# Patient Record
Sex: FEMALE | Race: WHITE | NOT HISPANIC OR LATINO | Employment: OTHER | URBAN - METROPOLITAN AREA
[De-identification: names, ages, dates, MRNs, and addresses within clinical notes are randomized per-mention and may not be internally consistent; named-entity substitution may affect disease eponyms.]

---

## 2017-01-16 ENCOUNTER — HOSPITAL ENCOUNTER (OUTPATIENT)
Dept: LAB | Facility: MEDICAL CENTER | Age: 72
End: 2017-01-16
Attending: OBSTETRICS & GYNECOLOGY
Payer: MEDICARE

## 2017-01-16 PROCEDURE — 87077 CULTURE AEROBIC IDENTIFY: CPT

## 2017-01-16 PROCEDURE — 87086 URINE CULTURE/COLONY COUNT: CPT

## 2017-01-16 PROCEDURE — 87186 SC STD MICRODIL/AGAR DIL: CPT

## 2017-01-18 LAB
BACTERIA UR CULT: ABNORMAL
BACTERIA UR CULT: ABNORMAL
SIGNIFICANT IND 70042: ABNORMAL
SITE SITE: ABNORMAL
SOURCE SOURCE: ABNORMAL

## 2017-02-23 ENCOUNTER — HOSPITAL ENCOUNTER (OUTPATIENT)
Dept: RADIOLOGY | Facility: MEDICAL CENTER | Age: 72
End: 2017-02-23
Attending: NURSE PRACTITIONER
Payer: MEDICARE

## 2017-02-23 DIAGNOSIS — M47.812 SPONDYLOSIS OF CERVICAL REGION WITHOUT MYELOPATHY OR RADICULOPATHY: ICD-10-CM

## 2017-02-23 PROCEDURE — 72040 X-RAY EXAM NECK SPINE 2-3 VW: CPT

## 2017-03-10 ENCOUNTER — HOSPITAL ENCOUNTER (OUTPATIENT)
Dept: RADIOLOGY | Facility: MEDICAL CENTER | Age: 72
End: 2017-03-10
Attending: OBSTETRICS & GYNECOLOGY
Payer: MEDICARE

## 2017-03-10 DIAGNOSIS — N39.0 URINARY TRACT INFECTION, SITE NOT SPECIFIED: ICD-10-CM

## 2017-03-10 PROCEDURE — 76775 US EXAM ABDO BACK WALL LIM: CPT

## 2017-04-18 ENCOUNTER — HOSPITAL ENCOUNTER (OUTPATIENT)
Dept: LAB | Facility: MEDICAL CENTER | Age: 72
End: 2017-04-18
Attending: OBSTETRICS & GYNECOLOGY
Payer: MEDICARE

## 2017-04-18 LAB
ALBUMIN SERPL BCP-MCNC: 4.5 G/DL (ref 3.2–4.9)
ALBUMIN/GLOB SERPL: 1.9 G/DL
ALP SERPL-CCNC: 68 U/L (ref 30–99)
ALT SERPL-CCNC: 13 U/L (ref 2–50)
ANION GAP SERPL CALC-SCNC: 7 MMOL/L (ref 0–11.9)
APPEARANCE UR: ABNORMAL
APPEARANCE UR: ABNORMAL
AST SERPL-CCNC: 20 U/L (ref 12–45)
BACTERIA #/AREA URNS HPF: ABNORMAL /HPF
BACTERIA #/AREA URNS HPF: ABNORMAL /HPF
BILIRUB SERPL-MCNC: 0.4 MG/DL (ref 0.1–1.5)
BILIRUB UR QL STRIP.AUTO: NEGATIVE
BILIRUB UR QL STRIP.AUTO: NEGATIVE
BUN SERPL-MCNC: 18 MG/DL (ref 8–22)
CALCIUM SERPL-MCNC: 10.1 MG/DL (ref 8.5–10.5)
CHLORIDE SERPL-SCNC: 106 MMOL/L (ref 96–112)
CO2 SERPL-SCNC: 25 MMOL/L (ref 20–33)
COLOR UR: YELLOW
COLOR UR: YELLOW
CREAT SERPL-MCNC: 0.98 MG/DL (ref 0.5–1.4)
CULTURE IF INDICATED INDCX: YES UA CULTURE
EPI CELLS #/AREA URNS HPF: ABNORMAL /HPF
EPI CELLS #/AREA URNS HPF: ABNORMAL /HPF
GFR SERPL CREATININE-BSD FRML MDRD: 55 ML/MIN/1.73 M 2
GLOBULIN SER CALC-MCNC: 2.4 G/DL (ref 1.9–3.5)
GLUCOSE SERPL-MCNC: 86 MG/DL (ref 65–99)
GLUCOSE UR STRIP.AUTO-MCNC: NEGATIVE MG/DL
GLUCOSE UR STRIP.AUTO-MCNC: NEGATIVE MG/DL
HYALINE CASTS #/AREA URNS LPF: ABNORMAL /LPF
KETONES UR STRIP.AUTO-MCNC: NEGATIVE MG/DL
KETONES UR STRIP.AUTO-MCNC: NEGATIVE MG/DL
LEUKOCYTE ESTERASE UR QL STRIP.AUTO: NEGATIVE
LEUKOCYTE ESTERASE UR QL STRIP.AUTO: NEGATIVE
MICRO URNS: ABNORMAL
MICRO URNS: ABNORMAL
MUCOUS THREADS #/AREA URNS HPF: ABNORMAL /HPF
NITRITE UR QL STRIP.AUTO: NEGATIVE
NITRITE UR QL STRIP.AUTO: NEGATIVE
PH UR STRIP.AUTO: 6.5 [PH]
PH UR STRIP.AUTO: 6.5 [PH]
POTASSIUM SERPL-SCNC: 4.3 MMOL/L (ref 3.6–5.5)
PROT SERPL-MCNC: 6.9 G/DL (ref 6–8.2)
PROT UR QL STRIP: 30 MG/DL
PROT UR QL STRIP: 50 MG/DL
RBC # URNS HPF: ABNORMAL /HPF
RBC # URNS HPF: ABNORMAL /HPF
RBC UR QL AUTO: NEGATIVE
RBC UR QL AUTO: NEGATIVE
SODIUM SERPL-SCNC: 138 MMOL/L (ref 135–145)
SP GR UR STRIP.AUTO: 1.02
SP GR UR STRIP.AUTO: 1.02
WBC #/AREA URNS HPF: ABNORMAL /HPF
WBC #/AREA URNS HPF: ABNORMAL /HPF

## 2017-04-18 PROCEDURE — 80053 COMPREHEN METABOLIC PANEL: CPT

## 2017-04-18 PROCEDURE — 87086 URINE CULTURE/COLONY COUNT: CPT

## 2017-04-18 PROCEDURE — 36415 COLL VENOUS BLD VENIPUNCTURE: CPT

## 2017-04-18 PROCEDURE — 81001 URINALYSIS AUTO W/SCOPE: CPT

## 2017-04-20 LAB
BACTERIA UR CULT: NORMAL
SIGNIFICANT IND 70042: NORMAL
SOURCE SOURCE: NORMAL

## 2017-04-24 ENCOUNTER — HOSPITAL ENCOUNTER (OUTPATIENT)
Dept: PAIN MANAGEMENT | Facility: REHABILITATION | Age: 72
End: 2017-04-24
Attending: ANESTHESIOLOGY
Payer: MEDICARE

## 2017-04-24 VITALS
OXYGEN SATURATION: 96 % | HEIGHT: 68 IN | SYSTOLIC BLOOD PRESSURE: 139 MMHG | HEART RATE: 76 BPM | RESPIRATION RATE: 18 BRPM | TEMPERATURE: 98.5 F | BODY MASS INDEX: 21.92 KG/M2 | WEIGHT: 144.62 LBS | DIASTOLIC BLOOD PRESSURE: 88 MMHG

## 2017-04-24 PROCEDURE — 700111 HCHG RX REV CODE 636 W/ 250 OVERRIDE (IP)

## 2017-04-24 PROCEDURE — 76942 ECHO GUIDE FOR BIOPSY: CPT

## 2017-04-24 PROCEDURE — 20526 THER INJECTION CARP TUNNEL: CPT

## 2017-04-24 RX ORDER — BUPIVACAINE HYDROCHLORIDE 2.5 MG/ML
INJECTION, SOLUTION EPIDURAL; INFILTRATION; INTRACAUDAL
Status: COMPLETED
Start: 2017-04-24 | End: 2017-04-24

## 2017-04-24 RX ORDER — METHYLPREDNISOLONE ACETATE 80 MG/ML
INJECTION, SUSPENSION INTRA-ARTICULAR; INTRALESIONAL; INTRAMUSCULAR; SOFT TISSUE
Status: COMPLETED
Start: 2017-04-24 | End: 2017-04-24

## 2017-04-24 RX ADMIN — METHYLPREDNISOLONE ACETATE 80 MG: 80 INJECTION, SUSPENSION INTRA-ARTICULAR; INTRALESIONAL; INTRAMUSCULAR; SOFT TISSUE at 11:51

## 2017-04-24 RX ADMIN — BUPIVACAINE HYDROCHLORIDE 4 ML: 2.5 INJECTION, SOLUTION EPIDURAL; INFILTRATION; INTRACAUDAL; PERINEURAL at 11:51

## 2017-04-24 ASSESSMENT — PAIN SCALES - GENERAL
PAINLEVEL_OUTOF10: 6
PAINLEVEL_OUTOF10: 4

## 2017-04-24 NOTE — PROGRESS NOTES
Current medications reviewed with pt, see medications reconciliation form. Pt jany taking ASA or other blood thinners or anti-inflammatories.  Pt has a ride post-procedure(Sam to drive).  Printed and verbal discharge instructions given to pt who verbalized understanding.Pt takes a low dose sulfa drug for chronic UTI, asymptomatic . Notified

## 2017-10-09 ENCOUNTER — APPOINTMENT (OUTPATIENT)
Dept: RADIOLOGY | Facility: MEDICAL CENTER | Age: 72
End: 2017-10-09
Attending: OBSTETRICS & GYNECOLOGY
Payer: MEDICARE

## 2017-10-26 ENCOUNTER — HOSPITAL ENCOUNTER (OUTPATIENT)
Dept: LAB | Facility: MEDICAL CENTER | Age: 72
End: 2017-10-26
Attending: INTERNAL MEDICINE
Payer: MEDICARE

## 2017-10-26 DIAGNOSIS — E78.2 MIXED HYPERLIPIDEMIA: ICD-10-CM

## 2017-10-26 LAB
ALBUMIN SERPL BCP-MCNC: 4.3 G/DL (ref 3.2–4.9)
ALBUMIN/GLOB SERPL: 1.7 G/DL
ALP SERPL-CCNC: 54 U/L (ref 30–99)
ALT SERPL-CCNC: 12 U/L (ref 2–50)
ANION GAP SERPL CALC-SCNC: 6 MMOL/L (ref 0–11.9)
AST SERPL-CCNC: 21 U/L (ref 12–45)
BASOPHILS # BLD AUTO: 0.4 % (ref 0–1.8)
BASOPHILS # BLD: 0.02 K/UL (ref 0–0.12)
BILIRUB SERPL-MCNC: 0.4 MG/DL (ref 0.1–1.5)
BUN SERPL-MCNC: 12 MG/DL (ref 8–22)
CALCIUM SERPL-MCNC: 9.5 MG/DL (ref 8.5–10.5)
CHLORIDE SERPL-SCNC: 106 MMOL/L (ref 96–112)
CHOLEST SERPL-MCNC: 155 MG/DL (ref 100–199)
CO2 SERPL-SCNC: 26 MMOL/L (ref 20–33)
CREAT SERPL-MCNC: 0.73 MG/DL (ref 0.5–1.4)
EOSINOPHIL # BLD AUTO: 0.2 K/UL (ref 0–0.51)
EOSINOPHIL NFR BLD: 3.6 % (ref 0–6.9)
ERYTHROCYTE [DISTWIDTH] IN BLOOD BY AUTOMATED COUNT: 42.7 FL (ref 35.9–50)
GFR SERPL CREATININE-BSD FRML MDRD: >60 ML/MIN/1.73 M 2
GLOBULIN SER CALC-MCNC: 2.5 G/DL (ref 1.9–3.5)
GLUCOSE SERPL-MCNC: 72 MG/DL (ref 65–99)
HCT VFR BLD AUTO: 43.5 % (ref 37–47)
HDLC SERPL-MCNC: 61 MG/DL
HGB BLD-MCNC: 14.1 G/DL (ref 12–16)
IMM GRANULOCYTES # BLD AUTO: 0.03 K/UL (ref 0–0.11)
IMM GRANULOCYTES NFR BLD AUTO: 0.5 % (ref 0–0.9)
LDLC SERPL CALC-MCNC: 70 MG/DL
LYMPHOCYTES # BLD AUTO: 2.4 K/UL (ref 1–4.8)
LYMPHOCYTES NFR BLD: 43.3 % (ref 22–41)
MCH RBC QN AUTO: 31 PG (ref 27–33)
MCHC RBC AUTO-ENTMCNC: 32.4 G/DL (ref 33.6–35)
MCV RBC AUTO: 95.6 FL (ref 81.4–97.8)
MONOCYTES # BLD AUTO: 0.43 K/UL (ref 0–0.85)
MONOCYTES NFR BLD AUTO: 7.8 % (ref 0–13.4)
NEUTROPHILS # BLD AUTO: 2.46 K/UL (ref 2–7.15)
NEUTROPHILS NFR BLD: 44.4 % (ref 44–72)
NRBC # BLD AUTO: 0 K/UL
NRBC BLD AUTO-RTO: 0 /100 WBC
PLATELET # BLD AUTO: 345 K/UL (ref 164–446)
PMV BLD AUTO: 9.9 FL (ref 9–12.9)
POTASSIUM SERPL-SCNC: 4 MMOL/L (ref 3.6–5.5)
PROT SERPL-MCNC: 6.8 G/DL (ref 6–8.2)
RBC # BLD AUTO: 4.55 M/UL (ref 4.2–5.4)
SODIUM SERPL-SCNC: 138 MMOL/L (ref 135–145)
TRIGL SERPL-MCNC: 120 MG/DL (ref 0–149)
WBC # BLD AUTO: 5.5 K/UL (ref 4.8–10.8)

## 2017-10-26 PROCEDURE — 80061 LIPID PANEL: CPT

## 2017-10-26 PROCEDURE — 85025 COMPLETE CBC W/AUTO DIFF WBC: CPT

## 2017-10-26 PROCEDURE — 36415 COLL VENOUS BLD VENIPUNCTURE: CPT

## 2017-10-26 PROCEDURE — 80053 COMPREHEN METABOLIC PANEL: CPT

## 2017-11-07 ENCOUNTER — HOSPITAL ENCOUNTER (OUTPATIENT)
Dept: RADIOLOGY | Facility: MEDICAL CENTER | Age: 72
End: 2017-11-07
Attending: OBSTETRICS & GYNECOLOGY
Payer: MEDICARE

## 2017-11-07 DIAGNOSIS — Z12.31 SCREENING MAMMOGRAM, ENCOUNTER FOR: ICD-10-CM

## 2017-11-07 PROCEDURE — G0202 SCR MAMMO BI INCL CAD: HCPCS

## 2018-01-04 ENCOUNTER — OFFICE VISIT (OUTPATIENT)
Dept: MEDICAL GROUP | Age: 73
End: 2018-01-04
Payer: MEDICARE

## 2018-01-04 VITALS
BODY MASS INDEX: 23.21 KG/M2 | WEIGHT: 144.4 LBS | OXYGEN SATURATION: 92 % | TEMPERATURE: 99.2 F | HEIGHT: 66 IN | DIASTOLIC BLOOD PRESSURE: 62 MMHG | SYSTOLIC BLOOD PRESSURE: 108 MMHG | HEART RATE: 76 BPM

## 2018-01-04 DIAGNOSIS — E78.2 MIXED HYPERLIPIDEMIA: ICD-10-CM

## 2018-01-04 DIAGNOSIS — J30.9 CHRONIC ALLERGIC RHINITIS, UNSPECIFIED SEASONALITY, UNSPECIFIED TRIGGER: ICD-10-CM

## 2018-01-04 DIAGNOSIS — E55.9 VITAMIN D DEFICIENCY DISEASE: ICD-10-CM

## 2018-01-04 DIAGNOSIS — Z00.00 MEDICARE ANNUAL WELLNESS VISIT, SUBSEQUENT: ICD-10-CM

## 2018-01-04 DIAGNOSIS — N32.9 BLADDER DISORDER: ICD-10-CM

## 2018-01-04 DIAGNOSIS — M15.9 PRIMARY OSTEOARTHRITIS INVOLVING MULTIPLE JOINTS: ICD-10-CM

## 2018-01-04 DIAGNOSIS — F11.20 UNCOMPLICATED OPIOID DEPENDENCE (HCC): ICD-10-CM

## 2018-01-04 DIAGNOSIS — F51.01 PRIMARY INSOMNIA: ICD-10-CM

## 2018-01-04 DIAGNOSIS — M51.36 DDD (DEGENERATIVE DISC DISEASE), LUMBAR: ICD-10-CM

## 2018-01-04 DIAGNOSIS — G89.4 CHRONIC PAIN SYNDROME: ICD-10-CM

## 2018-01-04 DIAGNOSIS — M50.30 DDD (DEGENERATIVE DISC DISEASE), CERVICAL: ICD-10-CM

## 2018-01-04 DIAGNOSIS — N95.9 MENOPAUSAL AND POSTMENOPAUSAL DISORDER: ICD-10-CM

## 2018-01-04 DIAGNOSIS — J44.9 CHRONIC OBSTRUCTIVE PULMONARY DISEASE, UNSPECIFIED COPD TYPE (HCC): ICD-10-CM

## 2018-01-04 PROCEDURE — G0439 PPPS, SUBSEQ VISIT: HCPCS | Performed by: INTERNAL MEDICINE

## 2018-01-04 RX ORDER — FLUTICASONE PROPIONATE 50 MCG
2 SPRAY, SUSPENSION (ML) NASAL DAILY
Qty: 16 G | Refills: 12 | Status: SHIPPED | OUTPATIENT
Start: 2018-01-04 | End: 2018-01-04 | Stop reason: SDUPTHER

## 2018-01-04 RX ORDER — ALBUTEROL SULFATE 90 UG/1
2 AEROSOL, METERED RESPIRATORY (INHALATION) EVERY 6 HOURS PRN
Qty: 8.5 G | Refills: 11 | Status: SHIPPED | OUTPATIENT
Start: 2018-01-04 | End: 2019-01-03 | Stop reason: SDUPTHER

## 2018-01-04 RX ORDER — LOVASTATIN 10 MG/1
10 TABLET ORAL DAILY
Qty: 90 TAB | Refills: 4 | Status: SHIPPED | OUTPATIENT
Start: 2018-01-04 | End: 2019-01-03 | Stop reason: SDUPTHER

## 2018-01-04 RX ORDER — LOVASTATIN 10 MG/1
10 TABLET ORAL DAILY
Qty: 90 TAB | Refills: 4 | Status: SHIPPED | OUTPATIENT
Start: 2018-01-04 | End: 2018-01-04 | Stop reason: SDUPTHER

## 2018-01-04 RX ORDER — ZOLPIDEM TARTRATE 10 MG/1
10 TABLET ORAL NIGHTLY PRN
Qty: 30 TAB | Refills: 0 | Status: SHIPPED | OUTPATIENT
Start: 2018-01-04 | End: 2018-02-04

## 2018-01-04 RX ORDER — FLUTICASONE PROPIONATE 50 MCG
2 SPRAY, SUSPENSION (ML) NASAL DAILY
Qty: 16 G | Refills: 12 | Status: SHIPPED | OUTPATIENT
Start: 2018-01-04 | End: 2023-04-12

## 2018-01-04 RX ORDER — FENOFIBRATE 134 MG/1
CAPSULE ORAL
Qty: 90 CAP | Refills: 4 | Status: SHIPPED | OUTPATIENT
Start: 2018-01-04 | End: 2019-01-03 | Stop reason: SDUPTHER

## 2018-01-04 RX ORDER — FENOFIBRATE 134 MG/1
CAPSULE ORAL
Qty: 90 CAP | Refills: 4 | Status: SHIPPED | OUTPATIENT
Start: 2018-01-04 | End: 2018-01-04 | Stop reason: SDUPTHER

## 2018-01-04 ASSESSMENT — PATIENT HEALTH QUESTIONNAIRE - PHQ9: CLINICAL INTERPRETATION OF PHQ2 SCORE: 0

## 2018-01-04 NOTE — PROGRESS NOTES
Chief Complaint   Patient presents with   • Annual Wellness Visit              HPI:  Berenice is a 72 y.o. here for Medicare Annual Wellness Visit          Patient Active Problem List    Diagnosis Date Noted   • COLD (chronic obstructive lung disease) (CMS-HCC) 05/25/2016   • Bladder disorder- OAB -dr. willoughby (gyn) 05/20/2015   • DJD (degenerative joint disease)- knees; s/p bilateral TKR 07/24/2014   • Chronic allergic rhinitis 04/03/2014   • Vitamin D deficiency disease 12/17/2013   • COPD (chronic obstructive pulmonary disease) (CMS-HCC) 12/17/2013   • HLD (hyperlipidemia) 12/01/2011   • DDD (degenerative disc disease), lumbar 12/01/2011   • DDD (degenerative disc disease), cervical 12/01/2011   • Menopausal and postmenopausal disorder 12/01/2011       Current Outpatient Prescriptions   Medication Sig Dispense Refill   • fluticasone (FLONASE) 50 MCG/ACT nasal spray SHAKE LIQUID AND USE 2 SPRAYS IN EACH NOSTRIL EVERY DAY 16 g 12   • lovastatin (MEVACOR) 10 MG tablet Take 1 Tab by mouth every day. 90 Tab 4   • fenofibrate micronized (LOFIBRA) 134 MG capsule TAKE ONE CAPSULE BY MOUTH EVERY MORNING BEFORE BREAKFAST 90 Cap 4   • beclomethasone (QVAR) 40 MCG/ACT inhaler INHALE 1 PUFF BY MOUTH TWICE DAILY(REPLACES ADVAIR) 3 Inhaler 4   • Mirabegron ER (MYRBETRIQ) 25 MG TABLET SR 24 HR Take 1 Each by mouth 2 Times a Day. 180 Tab 1   • Cholecalciferol (VITAMIN D) 2000 UNITS Cap Take 1 Cap by mouth every day. 111 Cap 11   • hydrocodone/acetaminophen (NORCO)  MG TABS Take 1-2 Tabs by mouth every 6 hours as needed. 20 Tab 11   • methocarbamol (ROBAXIN-750) 750 MG TABS Take 1 Tab by mouth 4 times a day. 120 Tab 3   • albuterol (PROAIR HFA) 108 (90 BASE) MCG/ACT Aero Soln inhalation aerosol Inhale 2 Puffs by mouth every 6 hours as needed for Shortness of Breath. 8.5 g 3     No current facility-administered medications for this visit.         Patient is taking medications as noted in medication list.  Current  supplements as per medication list.     Allergies: Codeine and Morphine hcl    Current social contact/activities: plays [employer] Play[employer] ball      Is patient current with immunizations? No, due for TDAP. Patient is interested in receiving NONE today.    She  reports that she has never smoked. She has never used smokeless tobacco. She reports that she drinks alcohol. She reports that she does not use drugs.  Counseling given: Not Answered        DPA/Advanced directive: Patient has Advanced Directive, but it is not on file. Instructed to bring in a copy to scan into their chart.    ROS:    Gait: Uses no assistive device    Ostomy: no    Other tubes: no     Amputations: no      Chronic oxygen use no    Last eye exam 10/17    Wears hearing aids: no    : Reports urinary leakage during the last 6 months that has not interfered at all with their daily activities or sleep.       Screening:         Depression Screening    Little interest or pleasure in doing things?  0 - not at all  Feeling down, depressed, or hopeless? 0 - not at all  Patient Health Questionnaire Score: 0    If depressive symptoms identified deferred to follow up visit unless specifically addressed in assessment and plan.    Interpretation of PHQ-9 Total Score   Score Severity   1-4 No Depression   5-9 Mild Depression   10-14 Moderate Depression   15-19 Moderately Severe Depression   20-27 Severe Depression    Screening for Cognitive Impairment    Three Minute Recall (apple, watch, mariano)   /3 Table leader sunset  3/3  Draw clock face with all 12 numbers set to the hand to show 10 minutes past 11 o'clock  1 5/5  If cognitive concerns identified, deferred for follow up unless specifically addressed in assessment and plan.    Fall Risk Assessment    Has the patient had two or more falls in the last year or any fall with injury in the last year?  No  If fall risk identified, deferred for follow up unless specifically addressed in assessment and plan.    Safety  Assessment    Throw rugs on floor.  Yes  Handrails on all stairs.  Yes  Good lighting in all hallways.  Yes  Difficulty hearing.  No  Patient counseled about all safety risks that were identified.    Functional Assessment ADLs    Are there any barriers preventing you from cooking for yourself or meeting nutritional needs?  No.    Are there any barriers preventing you from driving safely or obtaining transportation?  No.    Are there any barriers preventing you from using a telephone or calling for help?  No.    Are there any barriers preventing you from shopping?  No.    Are there any barriers preventing you from taking care of your own finances?  No.    Are there any barriers preventing you from managing your medications?  No.    Are you currently engaging any exercise or physical activity?  Yes.  Pickle ball/walking    Health Maintenance Summary                PFT SCREENING-FEV1 AND FEV/FVC RATIO / SPIROMETRY SHOULD BE PERFORMED ANNUALLY Overdue 5/22/1961     IMM DTaP/Tdap/Td Vaccine Overdue 5/22/1962     PAP SMEAR Overdue 5/22/1964     BONE DENSITY Next Due 3/8/2018      Done 3/8/2013 DS-BONE DENSITY STUDY (DEXA)     Patient has more history with this topic...    MAMMOGRAM Next Due 11/7/2018      Done 11/7/2017 MA-MAMMO SCREENING BILAT W/TOMOSYNTHESIS W/CAD     Patient has more history with this topic...    COLONOSCOPY Next Due 10/12/2027      Done 10/12/2017 REFERRAL TO GI FOR COLONOSCOPY     Patient has more history with this topic...          Patient Care Team:  David Díaz M.D. as PCP - General  Boubacar Jackson M.D. as Consulting Physician (Gastroenterology)  Kalie Chin M.D. as Consulting Physician (OB/Gyn)  Eliu Elena M.D. as Consulting Physician (Anesthesia)  lCifton Hernandez O.D. as Consulting Physician (Optometry)  Rajendra Daniel M.D. as Consulting Physician (Orthopaedics)    Social History   Substance Use Topics   • Smoking status: Never Smoker   • Smokeless tobacco: Never Used  "  • Alcohol use Yes      Comment: 2 a day     Family History   Problem Relation Age of Onset   • Cancer Paternal Aunt    • Heart Disease Mother    • GI Father      She  has a past medical history of Anesthesia; Arthritis; Hypertriglyceridemia; Other specified symptom associated with female genital organs; Pain; Unspecified urinary incontinence; and Urinary bladder disorder.   Past Surgical History:   Procedure Laterality Date   • KNEE ARTHROPLASTY TOTAL Right 1/2015    dr awan at [facility]   • BLADDER SLING FEMALE  12/29/2009    Performed by SKYLER ORTIZ at [facility]   • RECTOCELE REPAIR  12/29/2009    Performed by SKYLER ORTIZ at [facility]   • CYSTOCELE REPAIR  12/29/2009    Performed by SKYLER ORTIZ at [facility]   • CYSTOSCOPY  12/29/2009    Performed by SKYLER ORTIZ at [facility]   • VAGINAL SUSPENSION  12/29/2009    Performed by SKYLER ORTIZ at [facility]   • COLONOSCOPY-FLEXIBLE  2009    g[facility]-neg   • CERVICAL FUSION POSTERIOR     • HYSTERECTOMY RADICAL     • HYSTEROSCOPY W/V     • PB LAMINOTOMY,LUMBAR DISK,1 INTRSP             Exam:     Blood pressure 108/62, pulse 76, temperature 37.3 °C (99.2 °F), height 1.676 m (5' 6\"), weight 65.5 kg (144 lb 6.4 oz), last menstrual period 12/01/1976, SpO2 92 %. Body mass index is 23.31 kg/m².    Hearing excellent.    Dentition good  Alert, oriented in no acute distress.  Eye contact is good, speech goal directed, affect calm       Assessment and Plan. The following treatment and monitoring plan is recommended:        1. Medicare annual wellness visit, subsequent      - [place] ANNUAL WELLNESS VISIT-INCLUDES PPPS SUBSEQUE*    2. Mixed hyperlipidemiaUnder good control. Continue same regimen.     - [place] ANNUAL WELLNESS VISIT-INCLUDES PPPS SUBSEQUE*  - lovastatin (MEVACOR) 10 MG tablet; Take 1 Tab by mouth every day.  Dispense: 90 Tab; Refill: 4  - " fenofibrate micronized (LOFIBRA) 134 MG capsule; TAKE ONE CAPSULE BY MOUTH EVERY MORNING BEFORE BREAKFAST  Dispense: 90 Cap; Refill: 4  - COMP METABOLIC PANEL; Future  - LIPID PROFILE; Future  - CBC WITH DIFFERENTIAL; Future    3. DDD (degenerative disc disease), lumbarUnder good control. Continue same regimen.     - [place] ANNUAL WELLNESS VISIT-INCLUDES PPPS SUBSEQUE*    4. DDD (degenerative disc disease), cervicalUnder good control. Continue same regimen.     - [place] ANNUAL WELLNESS VISIT-INCLUDES PPPS SUBSEQUE*    5. Menopausal and postmenopausal disorderUnder good control. Continue same regimen.     - [place] ANNUAL WELLNESS VISIT-INCLUDES PPPS SUBSEQUE*    6. Vitamin D deficiency diseaseUnder good control. Continue same regimen.   - [place] ANNUAL WELLNESS VISIT-INCLUDES PPPS SUBSEQUE*    7. Chronic obstructive pulmonary disease, unspecified COPD type (CMS-HCC)Under good control. Continue same regimen.     - [place] ANNUAL WELLNESS VISIT-INCLUDES PPPS SUBSEQUE*  - beclomethasone (QVAR) 40 MCG/ACT inhaler; Inhale 2 Puffs by mouth 2 Times a Day. INHALE 1 PUFF BY MOUTH TWICE DAILY(REPLACES ADVAIR)  Dispense: 3 Inhaler; Refill: 4  - albuterol (PROAIR HFA) 108 (90 Base) MCG/ACT Aero Soln inhalation aerosol; Inhale 2 Puffs by mouth every 6 hours as needed for Shortness of Breath.  Dispense: 8.5 g; Refill: 11    8. Chronic allergic rhinitis, unspecified seasonality, unspecified triggerUnder good control. Continue same regimen.     - [place] ANNUAL WELLNESS VISIT-INCLUDES PPPS SUBSEQUE*  - fluticasone (FLONASE) 50 MCG/ACT nasal spray; Spray 2 Sprays in nose every day.  Dispense: 16 g; Refill: 12    9. Primary osteoarthritis involving multiple jointsUnder good control. Continue same regimen.     - [place] ANNUAL WELLNESS VISIT-INCLUDES PPPS SUBSEQUE*    10. Bladder disorder- OAB -dr. willoughby (gyn)Under good control. Continue same regimen.     - [place] ANNUAL WELLNESS VISIT-INCLUDES PPPS SUBSEQUE*  - Mirabegron ER (MYRBETRIQ) 25 MG TABLET SR 24 HR; Take  1 Each by mouth 2 Times a Day.  Dispense: 180 Tab; Refill: 4    11. Bladder disorder- OAB -dr. willoughby (gyn)     - [place] ANNUAL WELLNESS VISIT-INCLUDES PPPS SUBSEQUE*  - Mirabegron ER (MYRBETRIQ) 25 MG TABLET SR 24 HR; Take 1 Each by mouth 2 Times a Day.  Dispense: 180 Tab; Refill: 4    12. Primary insomnia     - [place] ANNUAL WELLNESS VISIT-INCLUDES PPPS SUBSEQUE*  - zolpidem (AMBIEN) 10 MG Tab; Take 1 Tab by mouth at bedtime as needed for Sleep for up to 31 days.  Dispense: 30 Tab; Refill: 0    13. Uncomplicated opioid dependence (CMS-HCC)- for ddd cervical and lumbar spine- rx by pain mgt    14. Uncomplicated opioid dependence (CMS-HCC)- for ddd cervical and lumbar spine- rx by pain mgt        Services suggested: No services needed at this time   Health Care Screening recommendations as per orders if indicated.  Referrals offered: PT/OT/Nutrition counseling/Behavioral Health/Smoking cessation as per orders if indicated.    Discussion today about general wellness and lifestyle habits:    · Prevent falls and reduce trip hazards; Cautioned about securing or removing rugs.  · Have a working fire alarm and carbon monoxide detector;   · Engage in regular physical activity and social activities       Follow-up: No Follow-up on file.

## 2018-01-08 ENCOUNTER — TELEPHONE (OUTPATIENT)
Dept: MEDICAL GROUP | Age: 73
End: 2018-01-08

## 2018-01-09 NOTE — TELEPHONE ENCOUNTER
MEDICATION PRIOR AUTHORIZATION NEEDED:    1. Name of Medication: zolpidem (AMBIEN) 10 MG Tab    2. Requested By (Name of Pharmacy):   [facility] [address] [place] [address], [place] - [address] [facility]  [address] [facility] [address]  Phone: [phone] Fax: [phone]       3. Is insurance on file current? Yes Member ID - [id number]    4. What is the name & phone number of the 3rd party payor? Medicare [employer] [phone] option 1    Would you like a PAR started?

## 2018-01-15 NOTE — TELEPHONE ENCOUNTER
DOCUMENTATION OF PAR STATUS:    1. Name of Medication & Dose: Zolpidem 10 mg     2. Name of Prescription Coverage Company & phone #: [phone] option #1    3. Date Prior Auth Submitted: 01/15/18    4. What information was given to obtain insurance decision? Insomnia     5. Prior Auth Status? Approved     6. Patient Notified: yes

## 2018-04-26 ENCOUNTER — HOSPITAL ENCOUNTER (OUTPATIENT)
Dept: LAB | Facility: MEDICAL CENTER | Age: 73
End: 2018-04-26
Attending: INTERNAL MEDICINE
Payer: MEDICARE

## 2018-04-26 LAB
BASOPHILS # BLD AUTO: 0.7 % (ref 0–1.8)
BASOPHILS # BLD: 0.04 K/UL (ref 0–0.12)
CA-I SERPL-SCNC: 1.3 MMOL/L (ref 1.1–1.3)
EOSINOPHIL # BLD AUTO: 0.24 K/UL (ref 0–0.51)
EOSINOPHIL NFR BLD: 4 % (ref 0–6.9)
ERYTHROCYTE [DISTWIDTH] IN BLOOD BY AUTOMATED COUNT: 43.7 FL (ref 35.9–50)
HCT VFR BLD AUTO: 46.8 % (ref 37–47)
HGB BLD-MCNC: 14.9 G/DL (ref 12–16)
IMM GRANULOCYTES # BLD AUTO: 0.02 K/UL (ref 0–0.11)
IMM GRANULOCYTES NFR BLD AUTO: 0.3 % (ref 0–0.9)
LYMPHOCYTES # BLD AUTO: 2.32 K/UL (ref 1–4.8)
LYMPHOCYTES NFR BLD: 38.5 % (ref 22–41)
MCH RBC QN AUTO: 30.5 PG (ref 27–33)
MCHC RBC AUTO-ENTMCNC: 31.8 G/DL (ref 33.6–35)
MCV RBC AUTO: 95.9 FL (ref 81.4–97.8)
MONOCYTES # BLD AUTO: 0.41 K/UL (ref 0–0.85)
MONOCYTES NFR BLD AUTO: 6.8 % (ref 0–13.4)
NEUTROPHILS # BLD AUTO: 3 K/UL (ref 2–7.15)
NEUTROPHILS NFR BLD: 49.7 % (ref 44–72)
NRBC # BLD AUTO: 0 K/UL
NRBC BLD-RTO: 0 /100 WBC
PLATELET # BLD AUTO: 350 K/UL (ref 164–446)
PMV BLD AUTO: 10.3 FL (ref 9–12.9)
RBC # BLD AUTO: 4.88 M/UL (ref 4.2–5.4)
T4 FREE SERPL-MCNC: 0.8 NG/DL (ref 0.53–1.43)
TSH SERPL DL<=0.005 MIU/L-ACNC: 2.07 UIU/ML (ref 0.38–5.33)
WBC # BLD AUTO: 6 K/UL (ref 4.8–10.8)

## 2018-04-26 PROCEDURE — 84443 ASSAY THYROID STIM HORMONE: CPT

## 2018-04-26 PROCEDURE — 36415 COLL VENOUS BLD VENIPUNCTURE: CPT

## 2018-04-26 PROCEDURE — 82330 ASSAY OF CALCIUM: CPT

## 2018-04-26 PROCEDURE — 85025 COMPLETE CBC W/AUTO DIFF WBC: CPT

## 2018-04-26 PROCEDURE — 84439 ASSAY OF FREE THYROXINE: CPT

## 2018-04-27 ENCOUNTER — HOSPITAL ENCOUNTER (OUTPATIENT)
Dept: RADIOLOGY | Facility: MEDICAL CENTER | Age: 73
End: 2018-04-27
Attending: INTERNAL MEDICINE
Payer: MEDICARE

## 2018-04-27 DIAGNOSIS — K59.00 CONSTIPATION, UNSPECIFIED CONSTIPATION TYPE: ICD-10-CM

## 2018-04-27 DIAGNOSIS — E78.00 PURE HYPERCHOLESTEROLEMIA: ICD-10-CM

## 2018-04-27 DIAGNOSIS — Z12.11 SPECIAL SCREENING FOR MALIGNANT NEOPLASMS, COLON: ICD-10-CM

## 2018-04-27 DIAGNOSIS — I10 ESSENTIAL HYPERTENSION, MALIGNANT: ICD-10-CM

## 2018-04-27 DIAGNOSIS — J45.909 ASTHMATIC BRONCHITIS WITHOUT COMPLICATION, UNSPECIFIED ASTHMA SEVERITY, UNSPECIFIED WHETHER PERSISTENT: ICD-10-CM

## 2018-04-27 DIAGNOSIS — F11.20 OPIOID TYPE DEPENDENCE, CONTINUOUS (HCC): ICD-10-CM

## 2018-04-27 DIAGNOSIS — Z78.0 MENOPAUSE: ICD-10-CM

## 2018-04-27 DIAGNOSIS — G54.6 PHANTOM PAIN (HCC): ICD-10-CM

## 2018-04-27 DIAGNOSIS — R14.0 ABDOMINAL DISTENTION: ICD-10-CM

## 2018-04-27 DIAGNOSIS — M99.05 SOMATIC DYSFUNCTION OF PELVIS REGION: ICD-10-CM

## 2018-04-27 PROCEDURE — 74018 RADEX ABDOMEN 1 VIEW: CPT

## 2018-04-30 ENCOUNTER — HOSPITAL ENCOUNTER (OUTPATIENT)
Dept: RADIOLOGY | Facility: MEDICAL CENTER | Age: 73
End: 2018-04-30
Attending: INTERNAL MEDICINE
Payer: MEDICARE

## 2018-04-30 DIAGNOSIS — J45.909 ASTHMATIC BRONCHITIS WITHOUT COMPLICATION, UNSPECIFIED ASTHMA SEVERITY, UNSPECIFIED WHETHER PERSISTENT: ICD-10-CM

## 2018-04-30 DIAGNOSIS — G54.6 PHANTOM PAIN (HCC): ICD-10-CM

## 2018-04-30 DIAGNOSIS — Z78.0 MENOPAUSE: ICD-10-CM

## 2018-04-30 DIAGNOSIS — K59.00 CONSTIPATION, UNSPECIFIED CONSTIPATION TYPE: ICD-10-CM

## 2018-04-30 DIAGNOSIS — Z12.11 SPECIAL SCREENING FOR MALIGNANT NEOPLASMS, COLON: ICD-10-CM

## 2018-04-30 DIAGNOSIS — E78.00 PURE HYPERCHOLESTEROLEMIA: ICD-10-CM

## 2018-04-30 DIAGNOSIS — M99.05 SOMATIC DYSFUNCTION OF PELVIS REGION: ICD-10-CM

## 2018-04-30 DIAGNOSIS — F11.20 OPIOID TYPE DEPENDENCE, CONTINUOUS (HCC): ICD-10-CM

## 2018-04-30 DIAGNOSIS — I10 ESSENTIAL HYPERTENSION, MALIGNANT: ICD-10-CM

## 2018-04-30 DIAGNOSIS — R14.0 ABDOMINAL DISTENTION: ICD-10-CM

## 2018-04-30 PROCEDURE — 74018 RADEX ABDOMEN 1 VIEW: CPT

## 2018-05-02 ENCOUNTER — HOSPITAL ENCOUNTER (OUTPATIENT)
Dept: RADIOLOGY | Facility: MEDICAL CENTER | Age: 73
End: 2018-05-02
Attending: INTERNAL MEDICINE
Payer: MEDICARE

## 2018-05-02 DIAGNOSIS — F11.20 OPIOID TYPE DEPENDENCE, CONTINUOUS (HCC): ICD-10-CM

## 2018-05-02 DIAGNOSIS — I10 ESSENTIAL HYPERTENSION, MALIGNANT: ICD-10-CM

## 2018-05-02 DIAGNOSIS — M99.05 SOMATIC DYSFUNCTION OF PELVIS REGION: ICD-10-CM

## 2018-05-02 DIAGNOSIS — G54.6 PHANTOM PAIN (HCC): ICD-10-CM

## 2018-05-02 DIAGNOSIS — R14.0 ABDOMINAL DISTENTION: ICD-10-CM

## 2018-05-02 DIAGNOSIS — Z78.0 MENOPAUSE: ICD-10-CM

## 2018-05-02 DIAGNOSIS — J45.909 ASTHMATIC BRONCHITIS WITHOUT COMPLICATION, UNSPECIFIED ASTHMA SEVERITY, UNSPECIFIED WHETHER PERSISTENT: ICD-10-CM

## 2018-05-02 DIAGNOSIS — K59.00 CONSTIPATION, UNSPECIFIED CONSTIPATION TYPE: ICD-10-CM

## 2018-05-02 DIAGNOSIS — Z12.11 SPECIAL SCREENING FOR MALIGNANT NEOPLASMS, COLON: ICD-10-CM

## 2018-05-02 DIAGNOSIS — E78.00 PURE HYPERCHOLESTEROLEMIA: ICD-10-CM

## 2018-05-02 PROCEDURE — 74018 RADEX ABDOMEN 1 VIEW: CPT

## 2018-05-04 ENCOUNTER — HOSPITAL ENCOUNTER (OUTPATIENT)
Dept: RADIOLOGY | Facility: MEDICAL CENTER | Age: 73
End: 2018-05-04
Attending: INTERNAL MEDICINE
Payer: MEDICARE

## 2018-05-04 DIAGNOSIS — E78.00 PURE HYPERCHOLESTEROLEMIA: ICD-10-CM

## 2018-05-04 DIAGNOSIS — G54.6 PHANTOM PAIN (HCC): ICD-10-CM

## 2018-05-04 DIAGNOSIS — R14.0 ABDOMINAL DISTENTION: ICD-10-CM

## 2018-05-04 DIAGNOSIS — M99.05 SOMATIC DYSFUNCTION OF PELVIS REGION: ICD-10-CM

## 2018-05-04 DIAGNOSIS — Z78.0 MENOPAUSE: ICD-10-CM

## 2018-05-04 DIAGNOSIS — I10 ESSENTIAL HYPERTENSION, MALIGNANT: ICD-10-CM

## 2018-05-04 DIAGNOSIS — Z12.11 SPECIAL SCREENING FOR MALIGNANT NEOPLASMS, COLON: ICD-10-CM

## 2018-05-04 DIAGNOSIS — F11.20 OPIOID TYPE DEPENDENCE, CONTINUOUS (HCC): ICD-10-CM

## 2018-05-04 DIAGNOSIS — K59.00 CONSTIPATION, UNSPECIFIED CONSTIPATION TYPE: ICD-10-CM

## 2018-05-04 DIAGNOSIS — J45.909 ASTHMATIC BRONCHITIS WITHOUT COMPLICATION, UNSPECIFIED ASTHMA SEVERITY, UNSPECIFIED WHETHER PERSISTENT: ICD-10-CM

## 2018-05-04 PROCEDURE — 74018 RADEX ABDOMEN 1 VIEW: CPT

## 2018-06-20 ENCOUNTER — HOSPITAL ENCOUNTER (OUTPATIENT)
Dept: LAB | Facility: MEDICAL CENTER | Age: 73
End: 2018-06-20
Attending: INTERNAL MEDICINE
Payer: MEDICARE

## 2018-06-20 DIAGNOSIS — E78.2 MIXED HYPERLIPIDEMIA: ICD-10-CM

## 2018-06-20 LAB
ALBUMIN SERPL BCP-MCNC: 4.3 G/DL (ref 3.2–4.9)
ALBUMIN/GLOB SERPL: 1.9 G/DL
ALP SERPL-CCNC: 59 U/L (ref 30–99)
ALT SERPL-CCNC: 14 U/L (ref 2–50)
ANION GAP SERPL CALC-SCNC: 7 MMOL/L (ref 0–11.9)
AST SERPL-CCNC: 17 U/L (ref 12–45)
BASOPHILS # BLD AUTO: 0.7 % (ref 0–1.8)
BASOPHILS # BLD: 0.04 K/UL (ref 0–0.12)
BILIRUB SERPL-MCNC: 0.4 MG/DL (ref 0.1–1.5)
BUN SERPL-MCNC: 17 MG/DL (ref 8–22)
CALCIUM SERPL-MCNC: 9.8 MG/DL (ref 8.5–10.5)
CHLORIDE SERPL-SCNC: 109 MMOL/L (ref 96–112)
CHOLEST SERPL-MCNC: 175 MG/DL (ref 100–199)
CO2 SERPL-SCNC: 26 MMOL/L (ref 20–33)
CREAT SERPL-MCNC: 0.72 MG/DL (ref 0.5–1.4)
EOSINOPHIL # BLD AUTO: 0.2 K/UL (ref 0–0.51)
EOSINOPHIL NFR BLD: 3.4 % (ref 0–6.9)
ERYTHROCYTE [DISTWIDTH] IN BLOOD BY AUTOMATED COUNT: 41.8 FL (ref 35.9–50)
GLOBULIN SER CALC-MCNC: 2.3 G/DL (ref 1.9–3.5)
GLUCOSE SERPL-MCNC: 88 MG/DL (ref 65–99)
HCT VFR BLD AUTO: 47.7 % (ref 37–47)
HDLC SERPL-MCNC: 64 MG/DL
HGB BLD-MCNC: 15.1 G/DL (ref 12–16)
IMM GRANULOCYTES # BLD AUTO: 0.03 K/UL (ref 0–0.11)
IMM GRANULOCYTES NFR BLD AUTO: 0.5 % (ref 0–0.9)
LDLC SERPL CALC-MCNC: 89 MG/DL
LYMPHOCYTES # BLD AUTO: 2.18 K/UL (ref 1–4.8)
LYMPHOCYTES NFR BLD: 37.2 % (ref 22–41)
MCH RBC QN AUTO: 30.2 PG (ref 27–33)
MCHC RBC AUTO-ENTMCNC: 31.7 G/DL (ref 33.6–35)
MCV RBC AUTO: 95.4 FL (ref 81.4–97.8)
MONOCYTES # BLD AUTO: 0.4 K/UL (ref 0–0.85)
MONOCYTES NFR BLD AUTO: 6.8 % (ref 0–13.4)
NEUTROPHILS # BLD AUTO: 3.01 K/UL (ref 2–7.15)
NEUTROPHILS NFR BLD: 51.4 % (ref 44–72)
NRBC # BLD AUTO: 0 K/UL
NRBC BLD-RTO: 0 /100 WBC
PLATELET # BLD AUTO: 366 K/UL (ref 164–446)
PMV BLD AUTO: 10 FL (ref 9–12.9)
POTASSIUM SERPL-SCNC: 4 MMOL/L (ref 3.6–5.5)
PROT SERPL-MCNC: 6.6 G/DL (ref 6–8.2)
RBC # BLD AUTO: 5 M/UL (ref 4.2–5.4)
SODIUM SERPL-SCNC: 142 MMOL/L (ref 135–145)
TRIGL SERPL-MCNC: 109 MG/DL (ref 0–149)
WBC # BLD AUTO: 5.9 K/UL (ref 4.8–10.8)

## 2018-06-20 PROCEDURE — 80061 LIPID PANEL: CPT

## 2018-06-20 PROCEDURE — 85025 COMPLETE CBC W/AUTO DIFF WBC: CPT

## 2018-06-20 PROCEDURE — 80053 COMPREHEN METABOLIC PANEL: CPT

## 2018-06-20 PROCEDURE — 36415 COLL VENOUS BLD VENIPUNCTURE: CPT

## 2018-06-27 ENCOUNTER — OFFICE VISIT (OUTPATIENT)
Dept: MEDICAL GROUP | Age: 73
End: 2018-06-27
Payer: MEDICARE

## 2018-06-27 VITALS
TEMPERATURE: 97.1 F | SYSTOLIC BLOOD PRESSURE: 118 MMHG | OXYGEN SATURATION: 95 % | WEIGHT: 144 LBS | DIASTOLIC BLOOD PRESSURE: 78 MMHG | BODY MASS INDEX: 23.14 KG/M2 | HEIGHT: 66 IN | HEART RATE: 80 BPM

## 2018-06-27 DIAGNOSIS — N32.9 BLADDER DISORDER: ICD-10-CM

## 2018-06-27 DIAGNOSIS — F51.01 PRIMARY INSOMNIA: ICD-10-CM

## 2018-06-27 DIAGNOSIS — E55.9 VITAMIN D DEFICIENCY DISEASE: ICD-10-CM

## 2018-06-27 DIAGNOSIS — M51.36 DDD (DEGENERATIVE DISC DISEASE), LUMBAR: ICD-10-CM

## 2018-06-27 DIAGNOSIS — M50.30 DDD (DEGENERATIVE DISC DISEASE), CERVICAL: ICD-10-CM

## 2018-06-27 DIAGNOSIS — F11.20 OPIATE DEPENDENCE, CONTINUOUS (HCC): ICD-10-CM

## 2018-06-27 DIAGNOSIS — J43.1 PANLOBULAR EMPHYSEMA (HCC): ICD-10-CM

## 2018-06-27 DIAGNOSIS — F11.20 UNCOMPLICATED OPIOID DEPENDENCE (HCC): ICD-10-CM

## 2018-06-27 DIAGNOSIS — G89.29 CHRONIC ABDOMINAL PAIN: ICD-10-CM

## 2018-06-27 DIAGNOSIS — G89.4 CHRONIC PAIN SYNDROME: ICD-10-CM

## 2018-06-27 DIAGNOSIS — N95.9 MENOPAUSAL AND POSTMENOPAUSAL DISORDER: ICD-10-CM

## 2018-06-27 DIAGNOSIS — R10.9 CHRONIC ABDOMINAL PAIN: ICD-10-CM

## 2018-06-27 DIAGNOSIS — J30.9 CHRONIC ALLERGIC RHINITIS: ICD-10-CM

## 2018-06-27 DIAGNOSIS — E78.2 MIXED HYPERLIPIDEMIA: ICD-10-CM

## 2018-06-27 PROCEDURE — 99214 OFFICE O/P EST MOD 30 MIN: CPT | Performed by: INTERNAL MEDICINE

## 2018-06-27 RX ORDER — LINACLOTIDE 145 UG/1
CAPSULE, GELATIN COATED ORAL
COMMUNITY
Start: 2018-05-21 | End: 2018-06-27

## 2018-06-27 RX ORDER — HYDROCODONE BITARTRATE 30 MG/1
TABLET, EXTENDED RELEASE ORAL
COMMUNITY
End: 2018-06-27

## 2018-06-27 RX ORDER — FLUCONAZOLE 100 MG/1
TABLET ORAL
COMMUNITY
End: 2018-06-27

## 2018-06-27 RX ORDER — HYDROCODONE BITARTRATE 20 MG/1
TABLET, EXTENDED RELEASE ORAL
COMMUNITY
End: 2018-06-27

## 2018-06-27 RX ORDER — GRANULES FOR ORAL 3 G/1
POWDER ORAL
COMMUNITY
End: 2018-06-27

## 2018-06-27 RX ORDER — AZITHROMYCIN 250 MG/1
TABLET, FILM COATED ORAL
COMMUNITY
End: 2018-06-27

## 2018-06-27 RX ORDER — ESTRADIOL 0.04 MG/D
PATCH TRANSDERMAL
COMMUNITY
End: 2018-06-27

## 2018-06-27 RX ORDER — DICLOFENAC POTASSIUM 50 MG/1
TABLET, FILM COATED ORAL
COMMUNITY
End: 2018-06-27

## 2018-06-27 RX ORDER — GABAPENTIN 100 MG/1
CAPSULE ORAL
COMMUNITY
End: 2018-06-27

## 2018-06-27 RX ORDER — DICYCLOMINE HYDROCHLORIDE 10 MG/1
CAPSULE ORAL
COMMUNITY
Start: 2018-06-11 | End: 2019-07-10

## 2018-06-27 RX ORDER — BENZONATATE 100 MG/1
CAPSULE ORAL
COMMUNITY
End: 2018-06-27

## 2018-06-27 RX ORDER — NITROFURANTOIN MACROCRYSTALS 50 MG/1
CAPSULE ORAL
COMMUNITY
End: 2018-06-27

## 2018-06-27 RX ORDER — DICLOXACILLIN SODIUM 250 MG/1
CAPSULE ORAL
COMMUNITY
End: 2018-06-27

## 2018-06-27 RX ORDER — FENOFIBRATE 150 MG/1
CAPSULE ORAL
COMMUNITY
End: 2018-06-27

## 2018-06-27 RX ORDER — AMOXICILLIN AND CLAVULANATE POTASSIUM 875; 125 MG/1; MG/1
TABLET, FILM COATED ORAL
COMMUNITY
End: 2018-06-27

## 2018-06-27 RX ORDER — FLUCONAZOLE 200 MG/1
TABLET ORAL
COMMUNITY
End: 2018-06-27

## 2018-06-27 RX ORDER — LIDOCAINE 50 MG/G
PATCH TOPICAL
COMMUNITY
End: 2018-06-27

## 2018-06-27 RX ORDER — ZOLPIDEM TARTRATE 5 MG/1
5 TABLET ORAL NIGHTLY PRN
COMMUNITY
End: 2019-01-03 | Stop reason: SDUPTHER

## 2018-06-27 RX ORDER — GABAPENTIN 100 MG/1
CAPSULE ORAL
COMMUNITY
Start: 2018-06-26 | End: 2018-06-27

## 2018-06-27 RX ORDER — AMOXICILLIN 500 MG/1
CAPSULE ORAL
COMMUNITY
End: 2018-06-27

## 2018-06-27 RX ORDER — ERYTHROMYCIN 5 MG/G
OINTMENT OPHTHALMIC
COMMUNITY
End: 2018-06-27

## 2018-06-27 RX ORDER — IBUPROFEN 600 MG/1
TABLET ORAL
COMMUNITY
End: 2018-06-27

## 2018-06-27 RX ORDER — LEVOFLOXACIN 500 MG/1
TABLET, FILM COATED ORAL
COMMUNITY
End: 2018-06-27

## 2018-06-27 ASSESSMENT — ENCOUNTER SYMPTOMS
CARDIOVASCULAR NEGATIVE: 1
ABDOMINAL PAIN: 1
NEUROLOGICAL NEGATIVE: 1
BACK PAIN: 1
CONSTITUTIONAL NEGATIVE: 1
RESPIRATORY NEGATIVE: 1
EYES NEGATIVE: 1
PSYCHIATRIC NEGATIVE: 1
HEARTBURN: 1
NECK PAIN: 1

## 2018-06-27 NOTE — PROGRESS NOTES
Subjective:      Berenice Mart is a 73 y.o. female who presents with Follow-Up (labs)  The patient is here for followup of chronic medical problems listed below. The patient is compliant with medications and having no side effects from them. Denies chest pain,  , dyspnea, myalgias, or cough.     However patient continues to complain of severe diffuse abdominal pain primarily in the bilateral lower quadrants but also in the midepigastrium for which she follows up with GI.  She had a normal CT of her abdomen  3 years.  Normal ultrasound of kidney 2 years ago.  Recent lab work unremarkable with no liver function abnormalities and no anemia.    She is followed by pain management for her chronic back and neck pain on gabapentin and hydrocodone    She follows up with GI for her chronic abdominal pain.      Patient Active Problem List    Diagnosis Date Noted   • Primary insomnia 01/04/2018   • Chronic pain syndrome- d/t ddd l/s and cervical spine- pain mgt- rx hc/apap qd and gabapentin 01/04/2018   • Opiate dependence, continuous (HCC)- pain mgt 01/04/2018   • Bladder disorder- OAB -dr. chin (gyn) 05/20/2015   • Chronic allergic rhinitis 04/03/2014   • Vitamin D deficiency disease 12/17/2013   • COPD (chronic obstructive pulmonary disease) ([facility]) 12/17/2013   • Mixed hyperlipidemia 12/01/2011   • DDD (degenerative disc disease), lumbar 12/01/2011   • DDD (degenerative disc disease), cervical 12/01/2011   • Menopausal and postmenopausal disorder 12/01/2011     Allergies   Allergen Reactions   • Codeine    • Morphine Hcl Nausea     Dr Chin aware     Current Outpatient Prescriptions   Medication Sig Dispense Refill   • zolpidem (AMBIEN) 5 MG Tab Take 5 mg by mouth at bedtime as needed for Sleep.     • lovastatin (MEVACOR) 10 MG tablet Take 1 Tab by mouth every day. 90 Tab 4   • fluticasone (FLONASE) 50 MCG/ACT nasal spray Spray 2 Sprays in nose every day. 16 g 12   • beclomethasone (QVAR) 40 MCG/ACT inhaler  "Inhale 2 Puffs by mouth 2 Times a Day. INHALE 1 PUFF BY MOUTH TWICE DAILY(REPLACES ADVAIR) 3 Inhaler 4   • fenofibrate micronized (LOFIBRA) 134 MG capsule TAKE ONE CAPSULE BY MOUTH EVERY MORNING BEFORE BREAKFAST 90 Cap 4   • Mirabegron ER (MYRBETRIQ) 25 MG TABLET SR 24 HR Take 1 Each by mouth 2 Times a Day. 180 Tab 4   • albuterol (PROAIR HFA) 108 (90 Base) MCG/ACT Aero Soln inhalation aerosol Inhale 2 Puffs by mouth every 6 hours as needed for Shortness of Breath. 8.5 g 11   • hydrocodone/acetaminophen (NORCO)  MG TABS Take 1-2 Tabs by mouth every 6 hours as needed. 20 Tab 11   • methocarbamol (ROBAXIN-750) 750 MG TABS Take 1 Tab by mouth 4 times a day. 120 Tab 3     No current facility-administered medications for this visit.                HPI    Review of Systems   Constitutional: Negative.    HENT: Negative.    Eyes: Negative.    Respiratory: Negative.    Cardiovascular: Negative.    Gastrointestinal: Positive for abdominal pain and heartburn.   Genitourinary: Negative.    Musculoskeletal: Positive for back pain and neck pain.   Skin: Negative.    Neurological: Negative.    Endo/Heme/Allergies: Negative.    Psychiatric/Behavioral: Negative.           Objective:     /78   Pulse 80   Temp 36.2 °C (97.1 °F)   Ht 1.676 m (5' 5.98\")   Wt 65.3 kg (144 lb)   LMP 12/01/1976   SpO2 95%   BMI 23.25 kg/m²      Physical Exam   Constitutional: She is oriented to person, place, and time. She appears well-developed and well-nourished. No distress.   HENT:   Head: Normocephalic and atraumatic.   Right Ear: External ear normal.   Left Ear: External ear normal.   Nose: Nose normal.   Mouth/Throat: Oropharynx is clear and moist. No oropharyngeal exudate.   Eyes: Conjunctivae and EOM are normal. Pupils are equal, round, and reactive to light. Right eye exhibits no discharge. Left eye exhibits no discharge. No scleral icterus.   Neck: Normal range of motion. Neck supple. No JVD present. No tracheal deviation " present. No thyromegaly present.   Cardiovascular: Normal rate, regular rhythm, normal heart sounds and intact distal pulses.  Exam reveals no gallop and no friction rub.    No murmur heard.  Pulmonary/Chest: Effort normal and breath sounds normal. No stridor. No respiratory distress. She has no wheezes. She has no rales. She exhibits no tenderness.   Abdominal: Soft. Bowel sounds are normal. She exhibits no distension and no mass. There is tenderness. There is no rebound and no guarding.   Musculoskeletal: Normal range of motion. She exhibits no edema or tenderness.   Lymphadenopathy:     She has no cervical adenopathy.   Neurological: She is alert and oriented to person, place, and time. She has normal reflexes. She displays normal reflexes. No cranial nerve deficit. She exhibits normal muscle tone. Coordination normal.   Skin: Skin is warm and dry. No rash noted. She is not diaphoretic. No erythema. No pallor.   Psychiatric: She has a normal mood and affect. Her behavior is normal. Judgment and thought content normal.   Vitals reviewed.    Hospital Outpatient Visit on 06/20/2018   Component Date Value   • Sodium 06/20/2018 142    • Potassium 06/20/2018 4.0    • Chloride 06/20/2018 109    • Co2 06/20/2018 26    • Anion Gap 06/20/2018 7.0    • Glucose 06/20/2018 88    • Bun 06/20/2018 17    • Creatinine 06/20/2018 0.72    • Calcium 06/20/2018 9.8    • AST(SGOT) 06/20/2018 17    • ALT(SGPT) 06/20/2018 14    • Alkaline Phosphatase 06/20/2018 59    • Total Bilirubin 06/20/2018 0.4    • Albumin 06/20/2018 4.3    • Total Protein 06/20/2018 6.6    • Globulin 06/20/2018 2.3    • A-G Ratio 06/20/2018 1.9    • Cholesterol,Tot 06/20/2018 175    • Triglycerides 06/20/2018 109    • HDL 06/20/2018 64    • LDL 06/20/2018 89    • WBC 06/20/2018 5.9    • RBC 06/20/2018 5.00    • Hemoglobin 06/20/2018 15.1    • Hematocrit 06/20/2018 47.7*   • MCV 06/20/2018 95.4    • MCH 06/20/2018 30.2    • MCHC 06/20/2018 31.7*   • RDW 06/20/2018  41.8    • Platelet Count 06/20/2018 366    • MPV 06/20/2018 10.0    • Neutrophils-Polys 06/20/2018 51.40    • Lymphocytes 06/20/2018 37.20    • Monocytes 06/20/2018 6.80    • Eosinophils 06/20/2018 3.40    • Basophils 06/20/2018 0.70    • Immature Granulocytes 06/20/2018 0.50    • Nucleated RBC 06/20/2018 0.00    • Neutrophils (Absolute) 06/20/2018 3.01    • Lymphs (Absolute) 06/20/2018 2.18    • Monos (Absolute) 06/20/2018 0.40    • Eos (Absolute) 06/20/2018 0.20    • Baso (Absolute) 06/20/2018 0.04    • Immature Granulocytes (a* 06/20/2018 0.03    • NRBC (Absolute) 06/20/2018 0.00    • GFR If  06/20/2018 >60    • GFR If Non  [employer]meri* 06/20/2018 >60       Lab Results   Component Value Date/Time    HBA1C 6.0 (H) 05/04/2016 07:58 AM     Lab Results   Component Value Date/Time    SODIUM 142 06/20/2018 08:05 AM    POTASSIUM 4.0 06/20/2018 08:05 AM    CHLORIDE 109 06/20/2018 08:05 AM    CO2 26 06/20/2018 08:05 AM    GLUCOSE 88 06/20/2018 08:05 AM    BUN 17 06/20/2018 08:05 AM    CREATININE 0.72 06/20/2018 08:05 AM    CREATININE 0.7 08/17/2006 08:15 AM    ALKPHOSPHAT 59 06/20/2018 08:05 AM    ASTSGOT 17 06/20/2018 08:05 AM    ALTSGPT 14 06/20/2018 08:05 AM    TBILIRUBIN 0.4 06/20/2018 08:05 AM     No results found for: INR  Lab Results   Component Value Date/Time    CHOLSTRLTOT 175 06/20/2018 08:05 AM    LDL 89 06/20/2018 08:05 AM    HDL 64 06/20/2018 08:05 AM    TRIGLYCERIDE 109 06/20/2018 08:05 AM       No results found for: TESTOSTERONE  No results found for: TSH  Lab Results   Component Value Date/Time    FREET4 0.80 04/26/2018 12:25 PM    FREET4 0.95 03/24/2014 07:48 AM     Lab Results   Component Value Date/Time    URICACID 3.4 05/14/2012 07:53 AM     No components found for: VITB12  Lab Results   Component Value Date/Time    25HYDROXY 40 11/23/2016 07:40 AM    25HYDROXY 34 05/04/2016 07:58 AM                    Assessment/Plan:     1. Chronic abdominal pain-I think this is primarily due to her  chronic pain medication and bowel dysmotility secondary to that.  However we will recheck her CT to rule out pancreatic CA or ovarian CA even though she status post MYRA/BSO.  Ultrasound to rule out gallstones.  H. pylori breath test to rule out PUD pylori induced  - CT-ABDOMEN-PELVIS WITH & W/O; Future  - US-ABDOMEN COMPLETE SURVEY; Future  - H. PYLORI, UREA BREATH TEST, ADULT; Future    2. Mixed hyperlipidemia   Under good control. Continue same regimen.      3. Vitamin D deficiency disease         Under good control. Continue same regimen.4. Panlobular emphysema (HCC)    Under good control. Continue same regimen.    5. Bladder disorder- OAB -dr. willoughby (gyn)    Under good control. Continue same regimen.    6. Primary insomnia    Under good control. Continue same regimen.    7. Chronic pain syndrome- d/t ddd l/s and cervical spine- pain mgt     - REFERRAL TO PHYSICAL THERAPY Reason for Therapy: Eval/Treat/Report    8. Uncomplicated opioid dependence (CMS-HCC)- for ddd cervical and lumbar spine- rx by pain mgt    Under good control. Continue same regimen.    9. DDD (degenerative disc disease), lumbar     Under good control. Continue same regimen.  - REFERRAL TO PHYSICAL THERAPY Reason for Therapy: Eval/Treat/Report    10. DDD (degenerative disc disease), cervical    Under good control. Continue same regimen.  - REFERRAL TO PHYSICAL THERAPY Reason for Therapy: Eval/Treat/Report    11. Opiate dependence, continuous (HCC)    Under good control. Continue same regimen.  For long talk about trying to get off narcotic medications as I think is contributing to her chronic pain syndrome.  I explained she do better with discontinuation of gabapentin and getting into regular physical therapy on a daily continued basis.  Referral placed for    12. Chronic allergic rhinitis    Under good control. Continue same regimen.    13. Menopausal and postmenopausal disorder      Under good control. Continue same regimen.        40  minute face-to-face encounter took place today.  More than half of this time was spent in the coordination of care of the above problems, as well as counseling.

## 2018-07-06 ENCOUNTER — HOSPITAL ENCOUNTER (OUTPATIENT)
Dept: LAB | Facility: MEDICAL CENTER | Age: 73
End: 2018-07-06
Attending: INTERNAL MEDICINE
Payer: MEDICARE

## 2018-07-06 PROCEDURE — 83013 H PYLORI (C-13) BREATH: CPT

## 2018-07-08 LAB — UREA BREATH TEST QL: NEGATIVE

## 2018-07-10 ENCOUNTER — TELEPHONE (OUTPATIENT)
Dept: MEDICAL GROUP | Age: 73
End: 2018-07-10

## 2018-07-10 NOTE — TELEPHONE ENCOUNTER
----- Message from David Díaz M.D. sent at 7/9/2018  1:11 PM PDT -----  Call pt with normal results.

## 2018-07-10 NOTE — TELEPHONE ENCOUNTER
Phone Number Called: [phone] (home)       Message: Called patient no answer LVM for patient to contact clinic. Patient needs to be informed of results from provider.     Left Message for patient to call back: yes

## 2018-07-12 ENCOUNTER — HOSPITAL ENCOUNTER (OUTPATIENT)
Dept: RADIOLOGY | Facility: MEDICAL CENTER | Age: 73
End: 2018-07-12
Attending: INTERNAL MEDICINE
Payer: MEDICARE

## 2018-07-12 DIAGNOSIS — G89.29 CHRONIC ABDOMINAL PAIN: ICD-10-CM

## 2018-07-12 DIAGNOSIS — R10.9 CHRONIC ABDOMINAL PAIN: ICD-10-CM

## 2018-07-12 PROCEDURE — 700117 HCHG RX CONTRAST REV CODE 255: Performed by: INTERNAL MEDICINE

## 2018-07-12 PROCEDURE — 76700 US EXAM ABDOM COMPLETE: CPT

## 2018-07-12 PROCEDURE — 74177 CT ABD & PELVIS W/CONTRAST: CPT

## 2018-07-12 RX ADMIN — IOHEXOL 50 ML: 240 INJECTION, SOLUTION INTRATHECAL; INTRAVASCULAR; INTRAVENOUS; ORAL at 11:10

## 2018-07-12 RX ADMIN — IOHEXOL 100 ML: 350 INJECTION, SOLUTION INTRAVENOUS at 11:10

## 2018-07-12 NOTE — TELEPHONE ENCOUNTER
Phone Number Called: [phone] (home)       Message: please call office for results    Left Message for patient to call back: yes

## 2018-08-06 ENCOUNTER — HOSPITAL ENCOUNTER (OUTPATIENT)
Facility: MEDICAL CENTER | Age: 73
End: 2018-08-06
Attending: OBSTETRICS & GYNECOLOGY
Payer: MEDICARE

## 2018-08-06 LAB
APPEARANCE UR: ABNORMAL
BILIRUB UR QL STRIP.AUTO: ABNORMAL
COLOR UR: ABNORMAL
GLUCOSE UR STRIP.AUTO-MCNC: NEGATIVE MG/DL
KETONES UR STRIP.AUTO-MCNC: NEGATIVE MG/DL
LEUKOCYTE ESTERASE UR QL STRIP.AUTO: ABNORMAL
MICRO URNS: ABNORMAL
NITRITE UR QL STRIP.AUTO: POSITIVE
PH UR STRIP.AUTO: 7.5 [PH]
PROT UR QL STRIP: 30 MG/DL
RBC UR QL AUTO: NEGATIVE
SP GR UR STRIP.AUTO: 1.02
UROBILINOGEN UR STRIP.AUTO-MCNC: 1 MG/DL

## 2018-08-06 PROCEDURE — 87077 CULTURE AEROBIC IDENTIFY: CPT

## 2018-08-06 PROCEDURE — 87186 SC STD MICRODIL/AGAR DIL: CPT

## 2018-08-06 PROCEDURE — 81001 URINALYSIS AUTO W/SCOPE: CPT

## 2018-08-06 PROCEDURE — 87086 URINE CULTURE/COLONY COUNT: CPT

## 2018-08-07 LAB
AMORPH CRY #/AREA URNS HPF: PRESENT /HPF
BACTERIA #/AREA URNS HPF: ABNORMAL /HPF
EPI CELLS #/AREA URNS HPF: NEGATIVE /HPF
GRAN CASTS #/AREA URNS LPF: ABNORMAL /LPF
HYALINE CASTS #/AREA URNS LPF: ABNORMAL /LPF
RBC # URNS HPF: ABNORMAL /HPF
WBC #/AREA URNS HPF: ABNORMAL /HPF

## 2018-11-16 ENCOUNTER — HOSPITAL ENCOUNTER (OUTPATIENT)
Dept: RADIOLOGY | Facility: MEDICAL CENTER | Age: 73
End: 2018-11-16
Attending: OBSTETRICS & GYNECOLOGY
Payer: MEDICARE

## 2018-11-16 DIAGNOSIS — Z12.31 VISIT FOR SCREENING MAMMOGRAM: ICD-10-CM

## 2018-11-16 PROCEDURE — 77067 SCR MAMMO BI INCL CAD: CPT

## 2018-12-18 ENCOUNTER — TELEPHONE (OUTPATIENT)
Dept: MEDICAL GROUP | Age: 73
End: 2018-12-18

## 2018-12-18 NOTE — TELEPHONE ENCOUNTER
Phone Number Called: [phone] (home)       Message: please call for R/S and labs are already requested    Left Message for patient to call back: N\A

## 2019-01-02 ENCOUNTER — HOSPITAL ENCOUNTER (OUTPATIENT)
Dept: LAB | Facility: MEDICAL CENTER | Age: 74
End: 2019-01-02
Attending: INTERNAL MEDICINE
Payer: MEDICARE

## 2019-01-02 DIAGNOSIS — E78.2 MIXED HYPERLIPIDEMIA: ICD-10-CM

## 2019-01-02 LAB
ALBUMIN SERPL BCP-MCNC: 4.5 G/DL (ref 3.2–4.9)
ALBUMIN/GLOB SERPL: 1.7 G/DL
ALP SERPL-CCNC: 56 U/L (ref 30–99)
ALT SERPL-CCNC: 14 U/L (ref 2–50)
ANION GAP SERPL CALC-SCNC: 9 MMOL/L (ref 0–11.9)
AST SERPL-CCNC: 18 U/L (ref 12–45)
BASOPHILS # BLD AUTO: 0.7 % (ref 0–1.8)
BASOPHILS # BLD: 0.04 K/UL (ref 0–0.12)
BILIRUB SERPL-MCNC: 0.5 MG/DL (ref 0.1–1.5)
BUN SERPL-MCNC: 17 MG/DL (ref 8–22)
CALCIUM SERPL-MCNC: 10.5 MG/DL (ref 8.5–10.5)
CHLORIDE SERPL-SCNC: 106 MMOL/L (ref 96–112)
CHOLEST SERPL-MCNC: 185 MG/DL (ref 100–199)
CO2 SERPL-SCNC: 26 MMOL/L (ref 20–33)
CREAT SERPL-MCNC: 0.81 MG/DL (ref 0.5–1.4)
EOSINOPHIL # BLD AUTO: 0.27 K/UL (ref 0–0.51)
EOSINOPHIL NFR BLD: 4.5 % (ref 0–6.9)
ERYTHROCYTE [DISTWIDTH] IN BLOOD BY AUTOMATED COUNT: 45.1 FL (ref 35.9–50)
GLOBULIN SER CALC-MCNC: 2.6 G/DL (ref 1.9–3.5)
GLUCOSE SERPL-MCNC: 89 MG/DL (ref 65–99)
HCT VFR BLD AUTO: 47.6 % (ref 37–47)
HDLC SERPL-MCNC: 72 MG/DL
HGB BLD-MCNC: 14.9 G/DL (ref 12–16)
IMM GRANULOCYTES # BLD AUTO: 0.02 K/UL (ref 0–0.11)
IMM GRANULOCYTES NFR BLD AUTO: 0.3 % (ref 0–0.9)
LDLC SERPL CALC-MCNC: 91 MG/DL
LYMPHOCYTES # BLD AUTO: 2.5 K/UL (ref 1–4.8)
LYMPHOCYTES NFR BLD: 41.8 % (ref 22–41)
MCH RBC QN AUTO: 30.2 PG (ref 27–33)
MCHC RBC AUTO-ENTMCNC: 31.3 G/DL (ref 33.6–35)
MCV RBC AUTO: 96.4 FL (ref 81.4–97.8)
MONOCYTES # BLD AUTO: 0.46 K/UL (ref 0–0.85)
MONOCYTES NFR BLD AUTO: 7.7 % (ref 0–13.4)
NEUTROPHILS # BLD AUTO: 2.69 K/UL (ref 2–7.15)
NEUTROPHILS NFR BLD: 45 % (ref 44–72)
NRBC # BLD AUTO: 0 K/UL
NRBC BLD-RTO: 0 /100 WBC
PLATELET # BLD AUTO: 340 K/UL (ref 164–446)
PMV BLD AUTO: 10.1 FL (ref 9–12.9)
POTASSIUM SERPL-SCNC: 4.2 MMOL/L (ref 3.6–5.5)
PROT SERPL-MCNC: 7.1 G/DL (ref 6–8.2)
RBC # BLD AUTO: 4.94 M/UL (ref 4.2–5.4)
SODIUM SERPL-SCNC: 141 MMOL/L (ref 135–145)
TRIGL SERPL-MCNC: 111 MG/DL (ref 0–149)
TSH SERPL DL<=0.005 MIU/L-ACNC: 3.29 UIU/ML (ref 0.38–5.33)
WBC # BLD AUTO: 6 K/UL (ref 4.8–10.8)

## 2019-01-02 PROCEDURE — 84443 ASSAY THYROID STIM HORMONE: CPT

## 2019-01-02 PROCEDURE — 85025 COMPLETE CBC W/AUTO DIFF WBC: CPT

## 2019-01-02 PROCEDURE — 80061 LIPID PANEL: CPT

## 2019-01-02 PROCEDURE — 36415 COLL VENOUS BLD VENIPUNCTURE: CPT

## 2019-01-02 PROCEDURE — 80053 COMPREHEN METABOLIC PANEL: CPT

## 2019-01-03 ENCOUNTER — OFFICE VISIT (OUTPATIENT)
Dept: MEDICAL GROUP | Age: 74
End: 2019-01-03
Payer: MEDICARE

## 2019-01-03 VITALS
TEMPERATURE: 97.3 F | OXYGEN SATURATION: 93 % | SYSTOLIC BLOOD PRESSURE: 126 MMHG | DIASTOLIC BLOOD PRESSURE: 72 MMHG | WEIGHT: 149 LBS | HEIGHT: 66 IN | HEART RATE: 87 BPM | BODY MASS INDEX: 23.95 KG/M2

## 2019-01-03 DIAGNOSIS — F51.01 PRIMARY INSOMNIA: ICD-10-CM

## 2019-01-03 DIAGNOSIS — Z78.0 MENOPAUSE: ICD-10-CM

## 2019-01-03 DIAGNOSIS — E78.2 MIXED HYPERLIPIDEMIA: ICD-10-CM

## 2019-01-03 DIAGNOSIS — J44.9 CHRONIC OBSTRUCTIVE PULMONARY DISEASE, UNSPECIFIED COPD TYPE (HCC): ICD-10-CM

## 2019-01-03 PROCEDURE — 99214 OFFICE O/P EST MOD 30 MIN: CPT | Performed by: INTERNAL MEDICINE

## 2019-01-03 RX ORDER — ALBUTEROL SULFATE 90 UG/1
2 AEROSOL, METERED RESPIRATORY (INHALATION) EVERY 6 HOURS PRN
Qty: 8.5 G | Refills: 11 | Status: SHIPPED | OUTPATIENT
Start: 2019-01-03 | End: 2019-01-03 | Stop reason: SDUPTHER

## 2019-01-03 RX ORDER — FENOFIBRATE 134 MG/1
CAPSULE ORAL
Qty: 90 CAP | Refills: 4 | Status: SHIPPED | OUTPATIENT
Start: 2019-01-03 | End: 2020-01-15

## 2019-01-03 RX ORDER — ZOLPIDEM TARTRATE 5 MG/1
5 TABLET ORAL NIGHTLY PRN
Qty: 30 TAB | Refills: 4 | Status: SHIPPED | OUTPATIENT
Start: 2019-01-03 | End: 2019-02-02

## 2019-01-03 RX ORDER — LOVASTATIN 10 MG/1
10 TABLET ORAL DAILY
Qty: 90 TAB | Refills: 4 | Status: SHIPPED | OUTPATIENT
Start: 2019-01-03 | End: 2019-01-03 | Stop reason: SDUPTHER

## 2019-01-03 RX ORDER — FENOFIBRATE 134 MG/1
CAPSULE ORAL
Qty: 90 CAP | Refills: 4 | Status: SHIPPED | OUTPATIENT
Start: 2019-01-03 | End: 2019-01-03 | Stop reason: SDUPTHER

## 2019-01-03 RX ORDER — ALBUTEROL SULFATE 90 UG/1
2 AEROSOL, METERED RESPIRATORY (INHALATION) EVERY 6 HOURS PRN
Qty: 8.5 G | Refills: 11 | Status: SHIPPED | OUTPATIENT
Start: 2019-01-03 | End: 2021-03-08 | Stop reason: SDUPTHER

## 2019-01-03 RX ORDER — LOVASTATIN 10 MG/1
10 TABLET ORAL DAILY
Qty: 90 TAB | Refills: 4 | Status: SHIPPED | OUTPATIENT
Start: 2019-01-03 | End: 2020-01-15

## 2019-01-03 ASSESSMENT — ENCOUNTER SYMPTOMS
SHORTNESS OF BREATH: 0
CARDIOVASCULAR NEGATIVE: 1
ABDOMINAL PAIN: 0
EYES NEGATIVE: 1
CONSTIPATION: 1
MUSCULOSKELETAL NEGATIVE: 1
RESPIRATORY NEGATIVE: 1
CONSTITUTIONAL NEGATIVE: 1
NEUROLOGICAL NEGATIVE: 1
PSYCHIATRIC NEGATIVE: 1

## 2019-01-03 ASSESSMENT — PATIENT HEALTH QUESTIONNAIRE - PHQ9: CLINICAL INTERPRETATION OF PHQ2 SCORE: 0

## 2019-01-03 NOTE — PROGRESS NOTES
"Subjective:      Berenice Mart is a 73 y.o. female who presents with Follow-Up (lab review)            HPI    ROS       Objective:     /72 (BP Location: Left arm, Patient Position: Sitting)   Pulse 87   Temp 36.3 °C (97.3 °F) (Temporal)   Ht 1.676 m (5' 5.98\")   Wt 67.6 kg (149 lb)   LMP 12/01/1976   SpO2 93%   BMI 24.06 kg/m²      Physical Exam            Assessment/Plan:     1. Chronic obstructive pulmonary disease, unspecified COPD type (HCC)  ***  - fluticasone-salmeterol (ADVAIR) 250-50 MCG/DOSE AEROSOL POWDER, BREATH ACTIVATED; Inhale 1 Puff by mouth 2 times a day for 30 days.  Dispense: 1 Inhaler; Refill: 11  - albuterol (PROAIR HFA) 108 (90 Base) MCG/ACT Aero Soln inhalation aerosol; Inhale 2 Puffs by mouth every 6 hours as needed for Shortness of Breath.  Dispense: 8.5 g; Refill: 11    2. Mixed hyperlipidemia  ***  - lovastatin (MEVACOR) 10 MG tablet; Take 1 Tab by mouth every day.  Dispense: 90 Tab; Refill: 4  - fenofibrate micronized (LOFIBRA) 134 MG capsule; TAKE ONE CAPSULE BY MOUTH EVERY MORNING BEFORE BREAKFAST  Dispense: 90 Cap; Refill: 4    3. Primary insomnia  ***  - zolpidem (AMBIEN) 5 MG Tab; Take 1 Tab by mouth at bedtime as needed for Sleep for up to 30 days.  Dispense: 30 Tab; Refill: 4    4. Menopause  ***  - DS-BONE DENSITY STUDY (DEXA); Future      "

## 2019-01-03 NOTE — PROGRESS NOTES
Subjective:     Berenice Mart is a 73 y.o. female who presents with Follow-Up (lab review)    HPI  The patient presents today for a six month follow-up. In addition, the patient is also here for follow up of chronic medical problems listed below. The patient is compliant with their medications and is having no side effects from them. At this time patient is doing well overall. She denies any chest pain, shortness of breath, or abdominal pain. Patient is requesting an Ambien prescription today to help her fall asleep on rare nights. Patient is due for a repeat DEXA scan.     History of hyperlipidemia currently controlled with lovastatin 10 mg QD and Lofibra 134 mg QAM. Lipid panel performed on 1/2/19 reported a cholesterol of 185, triglycerides of 111, HDL of 72, and LDL of 91.     History of chronic allergic rhinitis currently controlled with fluticasone 2 sprays QD and albuterol inhaler.      History of COPD currently controlled with Advair 50 mcg 1 puff BID.     History of chronic pain syndrome due to her D/T DDD L/S and C-Spine currently controlled with Norco 325 mg PRN and methocarbamol 750 mg QID. Patient developed opioid-induced constipation but was told that she cannot take Miralax.     Patient Active Problem List   Diagnosis   • Mixed hyperlipidemia   • DDD (degenerative disc disease), lumbar   • DDD (degenerative disc disease), cervical   • Menopausal and postmenopausal disorder   • Vitamin D deficiency disease   • COPD (chronic obstructive pulmonary disease) (HCC)   • Chronic allergic rhinitis   • Bladder disorder- OAB -dr. willoughby (gyn)   • Primary insomnia   • Chronic pain syndrome- d/t ddd l/s and cervical spine- pain mgt- rx hc/apap qd and gabapentin   • Opiate dependence, continuous (HCC)- pain mgt       Outpatient Medications Prior to Visit   Medication Sig Dispense Refill   • fluticasone (FLONASE) 50 MCG/ACT nasal spray Spray 2 Sprays in nose every day. 16 g 12   • Mirabegron ER (MYRBETRIQ) 25  "MG TABLET SR 24 HR Take 1 Each by mouth 2 Times a Day. 180 Tab 4   • zolpidem (AMBIEN) 5 MG Tab Take 5 mg by mouth at bedtime as needed for Sleep.     • lovastatin (MEVACOR) 10 MG tablet Take 1 Tab by mouth every day. 90 Tab 4   • beclomethasone (QVAR) 40 MCG/ACT inhaler Inhale 2 Puffs by mouth 2 Times a Day. INHALE 1 PUFF BY MOUTH TWICE DAILY(REPLACES ADVAIR) 3 Inhaler 4   • fenofibrate micronized (LOFIBRA) 134 MG capsule TAKE ONE CAPSULE BY MOUTH EVERY MORNING BEFORE BREAKFAST 90 Cap 4   • albuterol (PROAIR HFA) 108 (90 Base) MCG/ACT Aero Soln inhalation aerosol Inhale 2 Puffs by mouth every 6 hours as needed for Shortness of Breath. 8.5 g 11   • hydrocodone/acetaminophen (NORCO)  MG TABS Take 1-2 Tabs by mouth every 6 hours as needed. 20 Tab 11   • methocarbamol (ROBAXIN-750) 750 MG TABS Take 1 Tab by mouth 4 times a day. 120 Tab 3     No facility-administered medications prior to visit.         Allergies   Allergen Reactions   • Codeine    • Morphine Hcl Nausea     Dr Chin aware       Review of Systems   Constitutional: Negative.    HENT: Negative.    Eyes: Negative.    Respiratory: Negative.  Negative for shortness of breath.    Cardiovascular: Negative.  Negative for chest pain.   Gastrointestinal: Positive for constipation. Negative for abdominal pain.   Genitourinary: Negative.    Musculoskeletal: Negative.    Skin: Negative.    Neurological: Negative.    Endo/Heme/Allergies: Negative.    Psychiatric/Behavioral: Negative.    All other systems reviewed and are negative.         Objective:     /72 (BP Location: Left arm, Patient Position: Sitting)   Pulse 87   Temp 36.3 °C (97.3 °F) (Temporal)   Ht 1.676 m (5' 5.98\")   Wt 67.6 kg (149 lb)   LMP 12/01/1976   SpO2 93%   BMI 24.06 kg/m²     Physical Exam   Constitutional: Oriented to person, place, and time. Appears well-developed and well-nourished. No distress.   Head: Normocephalic and atraumatic.   Right Ear: External ear normal. "   Left Ear: External ear normal.   Nose: Nose normal.   Mouth/Throat: Oropharynx is clear and moist. No oropharyngeal exudate.   Eyes: Pupils are equal, round, and reactive to light. Conjunctivae and EOM are normal. Right eye exhibits no discharge. Left eye exhibits no discharge. No scleral icterus.   Neck: Normal range of motion. Neck supple. No JVD present. No tracheal deviation present. No thyromegaly present.   Cardiovascular: Normal rate, regular rhythm, normal heart sounds and intact distal pulses.  Exam reveals no gallop and no friction rub.    No murmur heard.  Pulmonary/Chest: Effort normal. No stridor. No respiratory distress. No wheezing or rales. No tenderness.   Abdominal: Soft. Bowel sounds are normal. No distension and no mass. There is no tenderness. There is no rebound and no guarding. No hernia.   Musculoskeletal: Normal range of motion No edema or tenderness.   Lymphadenopathy: No cervical adenopathy.   Neurological: Alert and oriented to person, place, and time. Normal reflexes. Normal reflexes. No cranial nerve deficit. Normal muscle tone. Coordination normal.   Skin: Skin is warm and dry. No rash noted. Not diaphoretic. No erythema. No pallor.   Psychiatric: Normal mood and affect. Behavior is normal. Judgment and thought content normal.   Nursing note and vitals reviewed.    Hospital Outpatient Visit on 01/02/2019   Component Date Value   • TSH 01/02/2019 3.290    • Sodium 01/02/2019 141    • Potassium 01/02/2019 4.2    • Chloride 01/02/2019 106    • Co2 01/02/2019 26    • Anion Gap 01/02/2019 9.0    • Glucose 01/02/2019 89    • Bun 01/02/2019 17    • Creatinine 01/02/2019 0.81    • Calcium 01/02/2019 10.5    • AST(SGOT) 01/02/2019 18    • ALT(SGPT) 01/02/2019 14    • Alkaline Phosphatase 01/02/2019 56    • Total Bilirubin 01/02/2019 0.5    • Albumin 01/02/2019 4.5    • Total Protein 01/02/2019 7.1    • Globulin 01/02/2019 2.6    • A-G Ratio 01/02/2019 1.7    • Cholesterol,Tot 01/02/2019 185     • Triglycerides 01/02/2019 111    • HDL 01/02/2019 72    • LDL 01/02/2019 91    • WBC 01/02/2019 6.0    • RBC 01/02/2019 4.94    • Hemoglobin 01/02/2019 14.9    • Hematocrit 01/02/2019 47.6*   • MCV 01/02/2019 96.4    • MCH 01/02/2019 30.2    • MCHC 01/02/2019 31.3*   • RDW 01/02/2019 45.1    • Platelet Count 01/02/2019 340    • MPV 01/02/2019 10.1    • Neutrophils-Polys 01/02/2019 45.00    • Lymphocytes 01/02/2019 41.80*   • Monocytes 01/02/2019 7.70    • Eosinophils 01/02/2019 4.50    • Basophils 01/02/2019 0.70    • Immature Granulocytes 01/02/2019 0.30    • Nucleated RBC 01/02/2019 0.00    • Neutrophils (Absolute) 01/02/2019 2.69    • Lymphs (Absolute) 01/02/2019 2.50    • Monos (Absolute) 01/02/2019 0.46    • Eos (Absolute) 01/02/2019 0.27    • Baso (Absolute) 01/02/2019 0.04    • Immature Granulocytes (a* 01/02/2019 0.02    • NRBC (Absolute) 01/02/2019 0.00    • GFR If  01/02/2019 >60    • GFR If Non  [employer]* 01/02/2019 >60       Lab Results   Component Value Date/Time    HBA1C 6.0 (H) 05/04/2016 07:58 AM     Lab Results   Component Value Date/Time    SODIUM 141 01/02/2019 07:38 AM    POTASSIUM 4.2 01/02/2019 07:38 AM    CHLORIDE 106 01/02/2019 07:38 AM    CO2 26 01/02/2019 07:38 AM    GLUCOSE 89 01/02/2019 07:38 AM    BUN 17 01/02/2019 07:38 AM    CREATININE 0.81 01/02/2019 07:38 AM    CREATININE 0.7 08/17/2006 08:15 AM    ALKPHOSPHAT 56 01/02/2019 07:38 AM    ASTSGOT 18 01/02/2019 07:38 AM    ALTSGPT 14 01/02/2019 07:38 AM    TBILIRUBIN 0.5 01/02/2019 07:38 AM     No results found for: INR  Lab Results   Component Value Date/Time    CHOLSTRLTOT 185 01/02/2019 07:38 AM    LDL 91 01/02/2019 07:38 AM    HDL 72 01/02/2019 07:38 AM    TRIGLYCERIDE 111 01/02/2019 07:38 AM       No results found for: TESTOSTERONE  No results found for: TSH  Lab Results   Component Value Date/Time    FREET4 0.80 04/26/2018 12:25 PM    FREET4 0.95 03/24/2014 07:48 AM     Lab Results   Component Value Date/Time     URICACID 3.4 05/14/2012 07:53 AM     No components found for: VITB12  Lab Results   Component Value Date/Time    25HYDROXY 40 11/23/2016 07:40 AM    25HYDROXY 34 05/04/2016 07:58 AM          Assessment/Plan:     1. Chronic obstructive pulmonary disease, unspecified COPD type (HCC)  Under good control. Continue same regimen.   - fluticasone-salmeterol (ADVAIR) 250-50 MCG/DOSE AEROSOL POWDER, BREATH ACTIVATED; Inhale 1 Puff by mouth 2 times a day for 30 days.  Dispense: 1 Inhaler; Refill: 11  - albuterol (PROAIR HFA) 108 (90 Base) MCG/ACT Aero Soln inhalation aerosol; Inhale 2 Puffs by mouth every 6 hours as needed for Shortness of Breath.  Dispense: 8.5 g; Refill: 11    2. Mixed hyperlipidemia  Under good control. Continue same regimen. Patient counseled to exercise, diet, and lose 15 lbs.    - lovastatin (MEVACOR) 10 MG tablet; Take 1 Tab by mouth every day.  Dispense: 90 Tab; Refill: 4  - fenofibrate micronized (LOFIBRA) 134 MG capsule; TAKE ONE CAPSULE BY MOUTH EVERY MORNING BEFORE BREAKFAST  Dispense: 90 Cap; Refill: 4    3. Primary insomnia  Patient requesting medication as her year's supply is almost out.   - zolpidem (AMBIEN) 5 MG Tab; Take 1 Tab by mouth at bedtime as needed for Sleep for up to 30 days.  Dispense: 30 Tab; Refill: 4    4. Menopause  Patient is due for a repeat DEXA.   - DS-BONE DENSITY STUDY (DEXA); Future     Follow-up in 6 months.     30 minute face-to-face encounter took place today.  More than half of this time was spent in the coordination of care of the above problems, as well as counseling.    Boubacar CASAREZ (Scribe), am scribing for, and in the presence of, David Díaz M.D.    Electronically signed by: Boubacar Willingham (Scribe), 1/3/2019    David CASAREZ M.D., personally performed the services described in this documentation, as scribed by Boubacar Willingham in my presence, and it is both accurate and complete.

## 2019-01-16 ENCOUNTER — HOSPITAL ENCOUNTER (OUTPATIENT)
Dept: RADIOLOGY | Facility: MEDICAL CENTER | Age: 74
End: 2019-01-16
Attending: INTERNAL MEDICINE
Payer: MEDICARE

## 2019-01-25 ENCOUNTER — HOSPITAL ENCOUNTER (OUTPATIENT)
Dept: RADIOLOGY | Facility: MEDICAL CENTER | Age: 74
End: 2019-01-25
Attending: INTERNAL MEDICINE
Payer: MEDICARE

## 2019-01-25 DIAGNOSIS — Z78.0 MENOPAUSE: ICD-10-CM

## 2019-01-25 PROCEDURE — 77080 DXA BONE DENSITY AXIAL: CPT

## 2019-07-08 ENCOUNTER — TELEPHONE (OUTPATIENT)
Dept: MEDICAL GROUP | Age: 74
End: 2019-07-08

## 2019-07-08 DIAGNOSIS — E78.2 MIXED HYPERLIPIDEMIA: ICD-10-CM

## 2019-07-08 DIAGNOSIS — E55.9 VITAMIN D DEFICIENCY DISEASE: ICD-10-CM

## 2019-07-08 NOTE — TELEPHONE ENCOUNTER
1. Caller Name: Berenice Mart                                           Call Back Number: [phone] (home)         Patient approves a detailed voicemail message: N\A    2. SPECIFIC Action To Be Taken: Lab orders requested for upcoming visit, please advise.

## 2019-07-09 ENCOUNTER — HOSPITAL ENCOUNTER (OUTPATIENT)
Dept: LAB | Facility: MEDICAL CENTER | Age: 74
End: 2019-07-09
Attending: INTERNAL MEDICINE
Payer: MEDICARE

## 2019-07-09 DIAGNOSIS — E55.9 VITAMIN D DEFICIENCY DISEASE: ICD-10-CM

## 2019-07-09 DIAGNOSIS — E78.2 MIXED HYPERLIPIDEMIA: ICD-10-CM

## 2019-07-09 LAB
25(OH)D3 SERPL-MCNC: 33 NG/ML (ref 30–100)
ALBUMIN SERPL BCP-MCNC: 4.7 G/DL (ref 3.2–4.9)
ALBUMIN/GLOB SERPL: 1.8 G/DL
ALP SERPL-CCNC: 50 U/L (ref 30–99)
ALT SERPL-CCNC: 14 U/L (ref 2–50)
ANION GAP SERPL CALC-SCNC: 9 MMOL/L (ref 0–11.9)
AST SERPL-CCNC: 20 U/L (ref 12–45)
BASOPHILS # BLD AUTO: 0.6 % (ref 0–1.8)
BASOPHILS # BLD: 0.03 K/UL (ref 0–0.12)
BILIRUB SERPL-MCNC: 0.5 MG/DL (ref 0.1–1.5)
BUN SERPL-MCNC: 14 MG/DL (ref 8–22)
CALCIUM SERPL-MCNC: 10.6 MG/DL (ref 8.5–10.5)
CHLORIDE SERPL-SCNC: 107 MMOL/L (ref 96–112)
CHOLEST SERPL-MCNC: 176 MG/DL (ref 100–199)
CO2 SERPL-SCNC: 26 MMOL/L (ref 20–33)
CREAT SERPL-MCNC: 0.91 MG/DL (ref 0.5–1.4)
EOSINOPHIL # BLD AUTO: 0.13 K/UL (ref 0–0.51)
EOSINOPHIL NFR BLD: 2.5 % (ref 0–6.9)
ERYTHROCYTE [DISTWIDTH] IN BLOOD BY AUTOMATED COUNT: 44.5 FL (ref 35.9–50)
FASTING STATUS PATIENT QL REPORTED: NORMAL
GLOBULIN SER CALC-MCNC: 2.6 G/DL (ref 1.9–3.5)
GLUCOSE SERPL-MCNC: 85 MG/DL (ref 65–99)
HCT VFR BLD AUTO: 49.3 % (ref 37–47)
HDLC SERPL-MCNC: 64 MG/DL
HGB BLD-MCNC: 15.9 G/DL (ref 12–16)
IMM GRANULOCYTES # BLD AUTO: 0.02 K/UL (ref 0–0.11)
IMM GRANULOCYTES NFR BLD AUTO: 0.4 % (ref 0–0.9)
LDLC SERPL CALC-MCNC: 85 MG/DL
LYMPHOCYTES # BLD AUTO: 1.97 K/UL (ref 1–4.8)
LYMPHOCYTES NFR BLD: 38 % (ref 22–41)
MCH RBC QN AUTO: 31.1 PG (ref 27–33)
MCHC RBC AUTO-ENTMCNC: 32.3 G/DL (ref 33.6–35)
MCV RBC AUTO: 96.5 FL (ref 81.4–97.8)
MONOCYTES # BLD AUTO: 0.36 K/UL (ref 0–0.85)
MONOCYTES NFR BLD AUTO: 6.9 % (ref 0–13.4)
NEUTROPHILS # BLD AUTO: 2.67 K/UL (ref 2–7.15)
NEUTROPHILS NFR BLD: 51.6 % (ref 44–72)
NRBC # BLD AUTO: 0 K/UL
NRBC BLD-RTO: 0 /100 WBC
PLATELET # BLD AUTO: 348 K/UL (ref 164–446)
PMV BLD AUTO: 10 FL (ref 9–12.9)
POTASSIUM SERPL-SCNC: 4.1 MMOL/L (ref 3.6–5.5)
PROT SERPL-MCNC: 7.3 G/DL (ref 6–8.2)
RBC # BLD AUTO: 5.11 M/UL (ref 4.2–5.4)
SODIUM SERPL-SCNC: 142 MMOL/L (ref 135–145)
TRIGL SERPL-MCNC: 133 MG/DL (ref 0–149)
WBC # BLD AUTO: 5.2 K/UL (ref 4.8–10.8)

## 2019-07-09 PROCEDURE — 80053 COMPREHEN METABOLIC PANEL: CPT

## 2019-07-09 PROCEDURE — 82306 VITAMIN D 25 HYDROXY: CPT

## 2019-07-09 PROCEDURE — 80061 LIPID PANEL: CPT

## 2019-07-09 PROCEDURE — 85025 COMPLETE CBC W/AUTO DIFF WBC: CPT

## 2019-07-09 PROCEDURE — 36415 COLL VENOUS BLD VENIPUNCTURE: CPT

## 2019-07-10 ENCOUNTER — OFFICE VISIT (OUTPATIENT)
Dept: MEDICAL GROUP | Age: 74
End: 2019-07-10
Payer: MEDICARE

## 2019-07-10 VITALS
HEIGHT: 66 IN | TEMPERATURE: 98 F | SYSTOLIC BLOOD PRESSURE: 104 MMHG | DIASTOLIC BLOOD PRESSURE: 64 MMHG | OXYGEN SATURATION: 94 % | HEART RATE: 82 BPM | WEIGHT: 148.8 LBS | BODY MASS INDEX: 23.91 KG/M2

## 2019-07-10 DIAGNOSIS — E78.2 MIXED HYPERLIPIDEMIA: ICD-10-CM

## 2019-07-10 DIAGNOSIS — F11.20 OPIATE DEPENDENCE, CONTINUOUS (HCC): ICD-10-CM

## 2019-07-10 DIAGNOSIS — F51.01 PRIMARY INSOMNIA: ICD-10-CM

## 2019-07-10 DIAGNOSIS — E55.9 VITAMIN D DEFICIENCY DISEASE: ICD-10-CM

## 2019-07-10 DIAGNOSIS — J42 CHRONIC BRONCHITIS, UNSPECIFIED CHRONIC BRONCHITIS TYPE (HCC): ICD-10-CM

## 2019-07-10 PROCEDURE — 99215 OFFICE O/P EST HI 40 MIN: CPT | Performed by: INTERNAL MEDICINE

## 2019-07-10 RX ORDER — SULFAMETHOXAZOLE AND TRIMETHOPRIM 400; 80 MG/1; MG/1
TABLET ORAL
COMMUNITY
End: 2021-03-08

## 2019-07-10 RX ORDER — METHOCARBAMOL 500 MG/1
TABLET, FILM COATED ORAL
COMMUNITY
End: 2021-09-08

## 2019-07-10 RX ORDER — HYDROCODONE BITARTRATE AND ACETAMINOPHEN 10; 325 MG/1; MG/1
TABLET ORAL
COMMUNITY
End: 2023-04-12 | Stop reason: SDUPTHER

## 2019-07-10 RX ORDER — SUCRALFATE 1 G/1
TABLET ORAL
COMMUNITY
End: 2021-03-08

## 2019-07-10 ASSESSMENT — ENCOUNTER SYMPTOMS
NEUROLOGICAL NEGATIVE: 1
GASTROINTESTINAL NEGATIVE: 1
CONSTITUTIONAL NEGATIVE: 1
RESPIRATORY NEGATIVE: 1
PSYCHIATRIC NEGATIVE: 1
EYES NEGATIVE: 1
MUSCULOSKELETAL NEGATIVE: 1
CARDIOVASCULAR NEGATIVE: 1

## 2019-07-10 NOTE — PROGRESS NOTES
"Subjective:      Berenice Mart is a 74 y.o. female who presents with Follow-Up; Hyperlipidemia; and Vitamin D Deficiency        HPI    The patient is here for followup of chronic medical problems listed below. The patient is compliant with medications and having no side effects from them. Denies chest pain, abdominal pain, dyspnea, myalgias, or cough.    Dyslipidemia  She has been taking Lovastatin 10 MG as prescribed for her dyslipidemia without myalgias or other side effects. Blood work was done before this visit.     Vitamin D deficiency  She takes 2000 U's of OTC Vitamin D supplementation. Blood work was completed prior to this visit. Under good control. Continue same regimen.     GI issues  She has been having GI issues. She has follow up with a GI physician. She has noticed \"bloating, to the point of appearing 8 months pregnant\". She has been taking Sucralfate to help with the gas.     Pain management - narcotics  She is following up with pain management for her narcotic medications for chronic pain. She was told to continue taking this medication as she is doing well and is very active with this medication. She is not currently in PT.       Patient Active Problem List   Diagnosis   • Mixed hyperlipidemia   • DDD (degenerative disc disease), lumbar   • DDD (degenerative disc disease), cervical   • Menopausal and postmenopausal disorder   • Vitamin D deficiency disease   • COPD (chronic obstructive pulmonary disease) (HCC)   • Chronic allergic rhinitis   • Bladder disorder- OAB -dr. willoughby (gyn)   • Primary insomnia   • Chronic pain syndrome- d/t ddd l/s and cervical spine- pain mgt- rx hc/apap qd and gabapentin   • Opiate dependence, continuous (HCC)- pain mgt       Outpatient Medications Prior to Visit   Medication Sig Dispense Refill   • lovastatin (MEVACOR) 10 MG tablet Take 1 Tab by mouth every day. 90 Tab 4   • fenofibrate micronized (LOFIBRA) 134 MG capsule TAKE ONE CAPSULE BY MOUTH EVERY MORNING " "BEFORE BREAKFAST 90 Cap 4   • albuterol (PROAIR HFA) 108 (90 Base) MCG/ACT Aero Soln inhalation aerosol Inhale 2 Puffs by mouth every 6 hours as needed for Shortness of Breath. 8.5 g 11   • fluticasone (FLONASE) 50 MCG/ACT nasal spray Spray 2 Sprays in nose every day. 16 g 12   • Mirabegron ER (MYRBETRIQ) 25 MG TABLET SR 24 HR Take 1 Each by mouth 2 Times a Day. 180 Tab 4     No facility-administered medications prior to visit.         Allergies   Allergen Reactions   • Codeine    • Morphine Hcl Nausea     Dr Chin aware       Review of Systems   Constitutional: Negative.    HENT: Negative.    Eyes: Negative.    Respiratory: Negative.    Cardiovascular: Negative.    Gastrointestinal: Negative.    Genitourinary: Negative.    Musculoskeletal: Negative.    Skin: Negative.    Neurological: Negative.    Endo/Heme/Allergies: Negative.    Psychiatric/Behavioral: Negative.      All other systems negative.  See HPI for further details.         Objective:     /64 (BP Location: Left arm, Patient Position: Sitting, BP Cuff Size: Adult)   Pulse 82   Temp 36.7 °C (98 °F) (Temporal)   Ht 1.676 m (5' 5.98\")   Wt 67.5 kg (148 lb 12.8 oz)   LMP 12/01/1976   SpO2 94%   BMI 24.03 kg/m²     Physical Exam   Constitutional: Oriented to person, place, and time. Appears well-developed and well-nourished. No distress.   Head: Normocephalic and atraumatic.   Right Ear: External ear normal.   Left Ear: External ear normal.   Nose: Nose normal.   Mouth/Throat: Oropharynx is clear and moist. No oropharyngeal exudate.   Eyes: Pupils are equal, round, and reactive to light. Conjunctivae and EOM are normal. Right eye exhibits no discharge. Left eye exhibits no discharge. No scleral icterus.   Neck: Normal range of motion. Neck supple. No JVD present. No tracheal deviation present. No thyromegaly present.   Cardiovascular: Normal rate, regular rhythm, normal heart sounds and intact distal pulses.  Exam reveals no gallop and no " friction rub.    No murmur heard.  Pulmonary/Chest: Effort normal. No stridor. No respiratory distress. No wheezing or rales. No tenderness.   Abdominal: Soft. Bowel sounds are normal. No distension and no mass. There is no tenderness. There is no rebound and no guarding. No hernia.   Musculoskeletal: Normal range of motion No edema or tenderness.   Lymphadenopathy: No cervical adenopathy.   Neurological: Alert and oriented to person, place, and time. Normal reflexes. Normal reflexes. No cranial nerve deficit. Normal muscle tone. Coordination normal.   Skin: Skin is warm and dry. No rash noted. Not diaphoretic. No erythema. No pallor.   Psychiatric: Normal mood and affect. Behavior is normal. Judgment and thought content normal.   Nursing note and vitals reviewed.      Lab Results   Component Value Date/Time    HBA1C 6.0 (H) 05/04/2016 07:58 AM     Lab Results   Component Value Date/Time    SODIUM 142 07/09/2019 07:31 AM    POTASSIUM 4.1 07/09/2019 07:31 AM    CHLORIDE 107 07/09/2019 07:31 AM    CO2 26 07/09/2019 07:31 AM    GLUCOSE 85 07/09/2019 07:31 AM    BUN 14 07/09/2019 07:31 AM    CREATININE 0.91 07/09/2019 07:31 AM    CREATININE 0.7 08/17/2006 08:15 AM    ALKPHOSPHAT 50 07/09/2019 07:31 AM    ASTSGOT 20 07/09/2019 07:31 AM    ALTSGPT 14 07/09/2019 07:31 AM    TBILIRUBIN 0.5 07/09/2019 07:31 AM     No results found for: INR  Lab Results   Component Value Date/Time    CHOLSTRLTOT 176 07/09/2019 07:31 AM    LDL 85 07/09/2019 07:31 AM    HDL 64 07/09/2019 07:31 AM    TRIGLYCERIDE 133 07/09/2019 07:31 AM       No results found for: TESTOSTERONE  No results found for: TSH  Lab Results   Component Value Date/Time    FREET4 0.80 04/26/2018 12:25 PM    FREET4 0.95 03/24/2014 07:48 AM     Lab Results   Component Value Date/Time    URICACID 3.4 05/14/2012 07:53 AM     No components found for: VITB12  Lab Results   Component Value Date/Time    25HYDROXY 33 07/09/2019 07:31 AM    25HYDROXY 40 11/23/2016 07:40 AM      Hospital Outpatient Visit on 07/09/2019   Component Date Value Ref Range Status   • Sodium 07/09/2019 142  135 - 145 mmol/L Final   • Potassium 07/09/2019 4.1  3.6 - 5.5 mmol/L Final   • Chloride 07/09/2019 107  96 - 112 mmol/L Final   • Co2 07/09/2019 26  20 - 33 mmol/L Final   • Anion Gap 07/09/2019 9.0  0.0 - 11.9 Final   • Glucose 07/09/2019 85  65 - 99 mg/dL Final   • Bun 07/09/2019 14  8 - 22 mg/dL Final   • Creatinine 07/09/2019 0.91  0.50 - 1.40 mg/dL Final   • Calcium 07/09/2019 10.6* 8.5 - 10.5 mg/dL Final   • AST(SGOT) 07/09/2019 20  12 - 45 U/L Final   • ALT(SGPT) 07/09/2019 14  2 - 50 U/L Final   • Alkaline Phosphatase 07/09/2019 50  30 - 99 U/L Final   • Total Bilirubin 07/09/2019 0.5  0.1 - 1.5 mg/dL Final   • Albumin 07/09/2019 4.7  3.2 - 4.9 g/dL Final   • Total Protein 07/09/2019 7.3  6.0 - 8.2 g/dL Final   • Globulin 07/09/2019 2.6  1.9 - 3.5 g/dL Final   • A-G Ratio 07/09/2019 1.8  g/dL Final   • Cholesterol,Tot 07/09/2019 176  100 - 199 mg/dL Final   • Triglycerides 07/09/2019 133  0 - 149 mg/dL Final   • HDL 07/09/2019 64  >=40 mg/dL Final   • LDL 07/09/2019 85  <100 mg/dL Final   • WBC 07/09/2019 5.2  4.8 - 10.8 K/uL Final   • RBC 07/09/2019 5.11  4.20 - 5.40 M/uL Final   • Hemoglobin 07/09/2019 15.9  12.0 - 16.0 g/dL Final   • Hematocrit 07/09/2019 49.3* 37.0 - 47.0 % Final   • MCV 07/09/2019 96.5  81.4 - 97.8 fL Final   • MCH 07/09/2019 31.1  27.0 - 33.0 pg Final   • MCHC 07/09/2019 32.3* 33.6 - 35.0 g/dL Final   • RDW 07/09/2019 44.5  35.9 - 50.0 fL Final   • Platelet Count 07/09/2019 348  164 - 446 K/uL Final   • MPV 07/09/2019 10.0  9.0 - 12.9 fL Final   • Neutrophils-Polys 07/09/2019 51.60  44.00 - 72.00 % Final   • Lymphocytes 07/09/2019 38.00  22.00 - 41.00 % Final   • Monocytes 07/09/2019 6.90  0.00 - 13.40 % Final   • Eosinophils 07/09/2019 2.50  0.00 - 6.90 % Final   • Basophils 07/09/2019 0.60  0.00 - 1.80 % Final   • Immature Granulocytes 07/09/2019 0.40  0.00 - 0.90 % Final    • Nucleated RBC 07/09/2019 0.00  /100 WBC Final   • Neutrophils (Absolute) 07/09/2019 2.67  2.00 - 7.15 K/uL Final    Includes immature neutrophils, if present.   • Lymphs (Absolute) 07/09/2019 1.97  1.00 - 4.80 K/uL Final   • Monos (Absolute) 07/09/2019 0.36  0.00 - 0.85 K/uL Final   • Eos (Absolute) 07/09/2019 0.13  0.00 - 0.51 K/uL Final   • Baso (Absolute) 07/09/2019 0.03  0.00 - 0.12 K/uL Final   • Immature Granulocytes (abs) 07/09/2019 0.02  0.00 - 0.11 K/uL Final   • NRBC (Absolute) 07/09/2019 0.00  K/uL Final   • 25-Hydroxy   Vitamin D 25 07/09/2019 33  30 - 100 ng/mL Final    Comment: Adult Ranges:   <20 ng/mL - Deficiency  20-29 ng/mL - Insufficiency   ng/mL - Sufficiency  The [employer] Vitamin D Assay is standardized to the  [facility] reference measurement procedures, a  reference method for the Vitamin D Standardization Program  (VDSP).  The VDSP aligns patient results among 25 (OH)  Vitamin D methods.     • Fasting Status 07/09/2019 Fasting   Final   • GFR If  07/09/2019 >60  >60 mL/min/1.73 m 2 Final   • GFR If Non  07/09/2019 >60  >60 mL/min/1.73 m 2 Final         Assessment/Plan:       1. Mixed hyperlipidemia  Lipid panel values were all within goal. Stable on current medication dosage. I have advised the patient to increase her exercise regimen and avoid fatty foods. Labs have been ordered for the next follow up visit.    - TSH; Future  - Comp Metabolic Panel; Future  - Lipid Profile; Future  - CBC WITH DIFFERENTIAL; Future    2. Vitamin D deficiency disease  Patient's Vitamin D level is within the target range at 33. Advised to continue current OTC supplementation of 2000 IU's daily.    - VITAMIN D,25 HYDROXY; Future    3. Primary insomnia  Under good control. Continue same regimen.     4. Chronic bronchitis, unspecified chronic bronchitis type (HCC)  Under good control. Continue same regimen.     5. Opiate dependence, continuous (HCC)- pain  mgt  Under good control. Continue same regimen. Continue follow up with Pain management regarding medications.     Other orders  - HYDROcodone/acetaminophen (NORCO)  MG Tab; hydrocodone 10 mg-acetaminophen 325 mg tablet  - methocarbamol (ROBAXIN) 500 MG Tab; methocarbamol 500 mg tablet  - sucralfate (CARAFATE) 1 GM Tab; sucralfate 1 gram tablet   Take 1 tablet 3 times a day by oral route for 30 days.  - sulfamethoxazole-trimethoprim (BACTRIM) 400-80 MG Tab; sulfamethoxazole 400 mg-trimethoprim 80 mg tablet     40 minute face-to-face encounter took place today.  More than half of this time was spent in the coordination of care of the above problems, as well as counseling.     IMagnus (Scribe), am scribing for, and in the presence of, David Díaz M.D..    Electronically signed by: Mangus You (Cecie), 7/10/2019    IDavid M.D., personally performed the services described in this documentation, as scribed by Magnus You in my presence, and it is both accurate and complete.

## 2019-11-18 ENCOUNTER — HOSPITAL ENCOUNTER (OUTPATIENT)
Dept: RADIOLOGY | Facility: MEDICAL CENTER | Age: 74
End: 2019-11-18
Payer: MEDICARE

## 2019-11-18 DIAGNOSIS — Z12.31 SCREENING MAMMOGRAM FOR HIGH-RISK PATIENT: ICD-10-CM

## 2019-11-18 PROCEDURE — 77063 BREAST TOMOSYNTHESIS BI: CPT

## 2020-01-06 ENCOUNTER — HOSPITAL ENCOUNTER (OUTPATIENT)
Dept: LAB | Facility: MEDICAL CENTER | Age: 75
End: 2020-01-06
Attending: INTERNAL MEDICINE
Payer: MEDICARE

## 2020-01-06 DIAGNOSIS — E55.9 VITAMIN D DEFICIENCY DISEASE: ICD-10-CM

## 2020-01-06 DIAGNOSIS — E78.2 MIXED HYPERLIPIDEMIA: ICD-10-CM

## 2020-01-06 LAB
25(OH)D3 SERPL-MCNC: 42 NG/ML (ref 30–100)
ALBUMIN SERPL BCP-MCNC: 4.5 G/DL (ref 3.2–4.9)
ALBUMIN/GLOB SERPL: 1.7 G/DL
ALP SERPL-CCNC: 53 U/L (ref 30–99)
ALT SERPL-CCNC: 12 U/L (ref 2–50)
ANION GAP SERPL CALC-SCNC: 10 MMOL/L (ref 0–11.9)
AST SERPL-CCNC: 17 U/L (ref 12–45)
BASOPHILS # BLD AUTO: 0.5 % (ref 0–1.8)
BASOPHILS # BLD: 0.03 K/UL (ref 0–0.12)
BILIRUB SERPL-MCNC: 0.4 MG/DL (ref 0.1–1.5)
BUN SERPL-MCNC: 12 MG/DL (ref 8–22)
CALCIUM SERPL-MCNC: 9.3 MG/DL (ref 8.5–10.5)
CHLORIDE SERPL-SCNC: 106 MMOL/L (ref 96–112)
CHOLEST SERPL-MCNC: 155 MG/DL (ref 100–199)
CO2 SERPL-SCNC: 26 MMOL/L (ref 20–33)
CREAT SERPL-MCNC: 0.78 MG/DL (ref 0.5–1.4)
EOSINOPHIL # BLD AUTO: 0.24 K/UL (ref 0–0.51)
EOSINOPHIL NFR BLD: 4.3 % (ref 0–6.9)
ERYTHROCYTE [DISTWIDTH] IN BLOOD BY AUTOMATED COUNT: 43.5 FL (ref 35.9–50)
FASTING STATUS PATIENT QL REPORTED: NORMAL
GLOBULIN SER CALC-MCNC: 2.6 G/DL (ref 1.9–3.5)
GLUCOSE SERPL-MCNC: 87 MG/DL (ref 65–99)
HCT VFR BLD AUTO: 46.6 % (ref 37–47)
HDLC SERPL-MCNC: 64 MG/DL
HGB BLD-MCNC: 15.1 G/DL (ref 12–16)
IMM GRANULOCYTES # BLD AUTO: 0.02 K/UL (ref 0–0.11)
IMM GRANULOCYTES NFR BLD AUTO: 0.4 % (ref 0–0.9)
LDLC SERPL CALC-MCNC: 71 MG/DL
LYMPHOCYTES # BLD AUTO: 2.08 K/UL (ref 1–4.8)
LYMPHOCYTES NFR BLD: 37.5 % (ref 22–41)
MCH RBC QN AUTO: 31.5 PG (ref 27–33)
MCHC RBC AUTO-ENTMCNC: 32.4 G/DL (ref 33.6–35)
MCV RBC AUTO: 97.1 FL (ref 81.4–97.8)
MONOCYTES # BLD AUTO: 0.36 K/UL (ref 0–0.85)
MONOCYTES NFR BLD AUTO: 6.5 % (ref 0–13.4)
NEUTROPHILS # BLD AUTO: 2.81 K/UL (ref 2–7.15)
NEUTROPHILS NFR BLD: 50.8 % (ref 44–72)
NRBC # BLD AUTO: 0 K/UL
NRBC BLD-RTO: 0 /100 WBC
PLATELET # BLD AUTO: 345 K/UL (ref 164–446)
PMV BLD AUTO: 10.1 FL (ref 9–12.9)
POTASSIUM SERPL-SCNC: 4 MMOL/L (ref 3.6–5.5)
PROT SERPL-MCNC: 7.1 G/DL (ref 6–8.2)
RBC # BLD AUTO: 4.8 M/UL (ref 4.2–5.4)
SODIUM SERPL-SCNC: 142 MMOL/L (ref 135–145)
TRIGL SERPL-MCNC: 102 MG/DL (ref 0–149)
TSH SERPL DL<=0.005 MIU/L-ACNC: 2.32 UIU/ML (ref 0.38–5.33)
WBC # BLD AUTO: 5.5 K/UL (ref 4.8–10.8)

## 2020-01-06 PROCEDURE — 84443 ASSAY THYROID STIM HORMONE: CPT

## 2020-01-06 PROCEDURE — 36415 COLL VENOUS BLD VENIPUNCTURE: CPT

## 2020-01-06 PROCEDURE — 85025 COMPLETE CBC W/AUTO DIFF WBC: CPT

## 2020-01-06 PROCEDURE — 80061 LIPID PANEL: CPT

## 2020-01-06 PROCEDURE — 80053 COMPREHEN METABOLIC PANEL: CPT

## 2020-01-06 PROCEDURE — 82306 VITAMIN D 25 HYDROXY: CPT

## 2020-01-08 ENCOUNTER — OFFICE VISIT (OUTPATIENT)
Dept: MEDICAL GROUP | Age: 75
End: 2020-01-08
Payer: MEDICARE

## 2020-01-08 VITALS
WEIGHT: 149 LBS | HEIGHT: 65 IN | SYSTOLIC BLOOD PRESSURE: 120 MMHG | DIASTOLIC BLOOD PRESSURE: 80 MMHG | BODY MASS INDEX: 24.83 KG/M2 | OXYGEN SATURATION: 93 % | TEMPERATURE: 98.1 F | HEART RATE: 80 BPM

## 2020-01-08 DIAGNOSIS — G89.4 CHRONIC PAIN SYNDROME: ICD-10-CM

## 2020-01-08 DIAGNOSIS — E55.9 VITAMIN D DEFICIENCY DISEASE: ICD-10-CM

## 2020-01-08 DIAGNOSIS — R14.0 ABDOMINAL BLOATING: ICD-10-CM

## 2020-01-08 DIAGNOSIS — F11.20 OPIATE DEPENDENCE, CONTINUOUS (HCC): ICD-10-CM

## 2020-01-08 DIAGNOSIS — M51.36 DDD (DEGENERATIVE DISC DISEASE), LUMBAR: ICD-10-CM

## 2020-01-08 DIAGNOSIS — F51.01 PRIMARY INSOMNIA: ICD-10-CM

## 2020-01-08 DIAGNOSIS — E78.2 MIXED HYPERLIPIDEMIA: ICD-10-CM

## 2020-01-08 DIAGNOSIS — Z23 NEED FOR VACCINATION: ICD-10-CM

## 2020-01-08 PROCEDURE — 90472 IMMUNIZATION ADMIN EACH ADD: CPT | Performed by: INTERNAL MEDICINE

## 2020-01-08 PROCEDURE — 99214 OFFICE O/P EST MOD 30 MIN: CPT | Mod: 25 | Performed by: INTERNAL MEDICINE

## 2020-01-08 PROCEDURE — 90746 HEPB VACCINE 3 DOSE ADULT IM: CPT | Performed by: INTERNAL MEDICINE

## 2020-01-08 PROCEDURE — G0010 ADMIN HEPATITIS B VACCINE: HCPCS | Performed by: INTERNAL MEDICINE

## 2020-01-08 PROCEDURE — 90715 TDAP VACCINE 7 YRS/> IM: CPT | Performed by: INTERNAL MEDICINE

## 2020-01-08 RX ORDER — DICYCLOMINE HYDROCHLORIDE 10 MG/1
CAPSULE ORAL
COMMUNITY
End: 2021-03-08 | Stop reason: SDUPTHER

## 2020-01-08 RX ORDER — POLYETHYLENE GLYCOL 1000
POWDER (GRAM) MISCELLANEOUS
COMMUNITY
End: 2021-03-08

## 2020-01-08 RX ORDER — ZOLPIDEM TARTRATE 10 MG/1
10 TABLET ORAL NIGHTLY PRN
Qty: 30 TAB | Refills: 5 | Status: SHIPPED | OUTPATIENT
Start: 2020-01-08 | End: 2020-02-07

## 2020-01-08 SDOH — HEALTH STABILITY: MENTAL HEALTH: HOW MANY STANDARD DRINKS CONTAINING ALCOHOL DO YOU HAVE ON A TYPICAL DAY?: 1 OR 2

## 2020-01-08 SDOH — HEALTH STABILITY: MENTAL HEALTH: HOW OFTEN DO YOU HAVE 6 OR MORE DRINKS ON ONE OCCASION?: NEVER

## 2020-01-08 SDOH — HEALTH STABILITY: MENTAL HEALTH: HOW OFTEN DO YOU HAVE A DRINK CONTAINING ALCOHOL?: MONTHLY OR LESS

## 2020-01-08 ASSESSMENT — ENCOUNTER SYMPTOMS
NEUROLOGICAL NEGATIVE: 1
MUSCULOSKELETAL NEGATIVE: 1
RESPIRATORY NEGATIVE: 1
CONSTITUTIONAL NEGATIVE: 1
EYES NEGATIVE: 1
CARDIOVASCULAR NEGATIVE: 1
PSYCHIATRIC NEGATIVE: 1
GASTROINTESTINAL NEGATIVE: 1

## 2020-01-08 ASSESSMENT — PATIENT HEALTH QUESTIONNAIRE - PHQ9: CLINICAL INTERPRETATION OF PHQ2 SCORE: 0

## 2020-01-08 NOTE — LETTER
[facility]  David Díaz M.D.  [address]  [place] [address]  Fax: [phone]   Authorization for Release/Disclosure of   Protected Health Information   Name: BERENICE MART : 1945 SSN: [id number]   Address: [address] [place] [address] Phone:    [phone] (home)    I authorize the entity listed below to release/disclose the PHI below to:   [facility]/David Díaz M.D. and David Díaz M.D.   Provider or Entity Name:[facility]     Address   City, [facility], [facility]   Phone:      Fax:[phone]     Reason for request: continuity of care   Information to be released:    [  ] LAST COLONOSCOPY,  including any PATH REPORT and follow-up  [  ] LAST FIT/COLOGUARD RESULT [  ] LAST DEXA  [  ] LAST MAMMOGRAM  [  ] LAST PAP  [  ] LAST LABS [  ] RETINA EXAM REPORT  [  ] IMMUNIZATION RECORDS  [ X] Release all info      [  ] Check here and initial the line next to each item to release ALL health information INCLUDING  _____ Care and treatment for drug and / or alcohol abuse  _____ HIV testing, infection status, or AIDS  _____ Genetic Testing    DATES OF SERVICE OR TIME PERIOD TO BE DISCLOSED: _____________  I understand and acknowledge that:  * This Authorization may be revoked at any time by you in writing, except if your health information has already been used or disclosed.  * Your health information that will be used or disclosed as a result of you signing this authorization could be re-disclosed by the recipient. If this occurs, your re-disclosed health information may no longer be protected by State or Federal laws.  * You may refuse to sign this Authorization. Your refusal will not affect your ability to obtain treatment.  * This Authorization becomes effective upon signing and will  on (date) __________.      If no date is indicated, this Authorization will  one (1) year from the signature date.    Name: Berenice Mart    Signature:   Date:      1/8/2020       PLEASE FAX REQUESTED RECORDS BACK TO: [phone]

## 2020-01-08 NOTE — PROGRESS NOTES
Subjective:      Berenice Antoine is a 74 y.o. female who presents with Bloating (1 year, has seen GI ) and Memory Loss        HPI    The patient is here for followup of chronic medical problems listed below. The patient is compliant with medications and having no side effects from them. Denies chest pain, abdominal pain, dyspnea, myalgias, or cough.    Patient has a history of GI issues. She complains of bloating onset 1 year. She states that the bloating has always been intermittent, but recently it has been constant.  She takes Miralax to help with constipation but notes that it does not help with the bloating. She has associated pain. She is wondering how to alleviate her bloating. She follows up with a GI specialist. She adds that she has had two hysterectomies.      Patient has a history of hyperlipidemia. She is currently taking Lovastatin 10 mg once daily. Denies any medication side effects. Her recent labs completed on 1/6/20 show lipid levels at normal limits.   Results for BERENICE ANTOINE (MRN [id number]) as of 1/8/2020 12:05   Ref. Range 7/9/2019 07:31 1/6/2020 07:53   Cholesterol,Tot Latest Ref Range: 100 - 199 mg/dL 176 155   Triglycerides Latest Ref Range: 0 - 149 mg/dL 133 102   HDL Latest Ref Range: >=40 mg/dL 64 64   LDL Latest Ref Range: <100 mg/dL 85 71       Patient has a history of Vitamin D deficiency. She is currently taking OTC supplementation 2000 units QD. Denies any side effects. Her last labs completed on 1/6/20 show her Vitamin D levels at normal limits.   Results for BERENICE ANTOINE (MRN [id number]) as of 1/8/2020 12:05   Ref. Range 7/9/2019 07:31 1/6/2020 07:53   25-Hydroxy   Vitamin D 25 Latest Ref Range: 30 - 100 ng/mL 33 42       Patient Active Problem List   Diagnosis   • Mixed hyperlipidemia   • DDD (degenerative disc disease), lumbar   • DDD (degenerative disc disease), cervical   • Menopausal and postmenopausal disorder   • Vitamin D deficiency disease   • COPD (chronic  obstructive pulmonary disease) ([facility])   • Chronic allergic rhinitis   • Bladder disorder- [facility] -dr. chin (gyn)   • Primary insomnia   • Chronic pain syndrome- d/t ddd l/s and cervical spine- pain mgt- rx hc/apap qd and gabapentin   • Opiate dependence, continuous (HCC)- pain mgt       Outpatient Medications Prior to Visit   Medication Sig Dispense Refill   • dicyclomine (BENTYL) 10 MG Cap dicyclomine     • beclomethasone (QVAR) 40 MCG/ACT inhaler Inhale 1 Puff by mouth 2 Times a Day.     • Polyethylene Glycol 1000 Powder by Does not apply route.     • HYDROcodone/acetaminophen (NORCO)  MG Tab hydrocodone 10 mg-acetaminophen 325 mg tablet     • methocarbamol (ROBAXIN) 500 MG Tab methocarbamol 500 mg tablet     • sucralfate (CARAFATE) 1 GM Tab sucralfate 1 gram tablet   Take 1 tablet 3 times a day by oral route for 30 days.     • sulfamethoxazole-trimethoprim (BACTRIM) 400-80 MG Tab sulfamethoxazole 400 mg-trimethoprim 80 mg tablet     • lovastatin (MEVACOR) 10 MG tablet Take 1 Tab by mouth every day. 90 Tab 4   • fenofibrate micronized (LOFIBRA) 134 MG capsule TAKE ONE CAPSULE BY MOUTH EVERY MORNING BEFORE BREAKFAST 90 Cap 4   • albuterol (PROAIR HFA) 108 (90 Base) MCG/ACT Aero Soln inhalation aerosol Inhale 2 Puffs by mouth every 6 hours as needed for Shortness of Breath. 8.5 g 11   • fluticasone (FLONASE) 50 MCG/ACT nasal spray Spray 2 Sprays in nose every day. 16 g 12   • Mirabegron ER (MYRBETRIQ) 25 MG TABLET SR 24 HR Take 1 Each by mouth 2 Times a Day. 180 Tab 4     No facility-administered medications prior to visit.         Allergies   Allergen Reactions   • Codeine    • Morphine Hcl Nausea     Dr Chin aware       Review of Systems   Constitutional: Negative.    HENT: Negative.    Eyes: Negative.    Respiratory: Negative.    Cardiovascular: Negative.    Gastrointestinal: Negative.    Genitourinary: Negative.    Musculoskeletal: Negative.    Skin: Negative.    Neurological: Negative.   "  Endo/Heme/Allergies: Negative.    Psychiatric/Behavioral: Negative.    All other systems reviewed and are negative.     Objective:     /80 (BP Location: Right arm, Patient Position: Sitting, BP Cuff Size: Adult)   Pulse 80   Temp 36.7 °C (98.1 °F) (Temporal)   Ht 1.651 m (5' 5\")   Wt 67.6 kg (149 lb)   LMP 12/01/1976   SpO2 93%   BMI 24.79 kg/m²     Physical Exam   Constitutional: Oriented to person, place, and time. Appears well-developed and well-nourished. No distress.   Head: Normocephalic and atraumatic.   Right Ear: External ear normal.   Left Ear: External ear normal.   Nose: Nose normal.   Mouth/Throat: Oropharynx is clear and moist. No oropharyngeal exudate.   Eyes: Pupils are equal, round, and reactive to light. Conjunctivae and EOM are normal. Right eye exhibits no discharge. Left eye exhibits no discharge. No scleral icterus.   Neck: Normal range of motion. Neck supple. No JVD present. No tracheal deviation present. No thyromegaly present.   Cardiovascular: Normal rate, regular rhythm, normal heart sounds and intact distal pulses.  Exam reveals no gallop and no friction rub.    No murmur heard.  Pulmonary/Chest: Effort normal. No stridor. No respiratory distress. No wheezing or rales. No tenderness.   Abdominal: Soft. Bowel sounds are normal. No distension and no mass. There is no tenderness. There is no rebound and no guarding. No hernia.   Musculoskeletal: Normal range of motion No edema or tenderness.   Lymphadenopathy: No cervical adenopathy.   Neurological: Alert and oriented to person, place, and time. Normal reflexes. Normal reflexes. No cranial nerve deficit. Normal muscle tone. Coordination normal.   Skin: Skin is warm and dry. No rash noted. Not diaphoretic. No erythema. No pallor.   Psychiatric: Normal mood and affect. Behavior is normal. Judgment and thought content normal.   Nursing note and vitals reviewed.      Lab Results   Component Value Date/Time    HBA1C 6.0 (H) " 05/04/2016 07:58 AM     Lab Results   Component Value Date/Time    SODIUM 142 01/06/2020 07:53 AM    POTASSIUM 4.0 01/06/2020 07:53 AM    CHLORIDE 106 01/06/2020 07:53 AM    CO2 26 01/06/2020 07:53 AM    GLUCOSE 87 01/06/2020 07:53 AM    BUN 12 01/06/2020 07:53 AM    CREATININE 0.78 01/06/2020 07:53 AM    CREATININE 0.7 08/17/2006 08:15 AM    ALKPHOSPHAT 53 01/06/2020 07:53 AM    ASTSGOT 17 01/06/2020 07:53 AM    ALTSGPT 12 01/06/2020 07:53 AM    TBILIRUBIN 0.4 01/06/2020 07:53 AM     No results found for: INR  Lab Results   Component Value Date/Time    CHOLSTRLTOT 155 01/06/2020 07:53 AM    LDL 71 01/06/2020 07:53 AM    HDL 64 01/06/2020 07:53 AM    TRIGLYCERIDE 102 01/06/2020 07:53 AM       No results found for: TESTOSTERONE  No results found for: TSH  Lab Results   Component Value Date/Time    FREET4 0.80 04/26/2018 12:25 PM    FREET4 0.95 03/24/2014 07:48 AM     Lab Results   Component Value Date/Time    URICACID 3.4 05/14/2012 07:53 AM     No components found for: VITB12  Lab Results   Component Value Date/Time    25HYDROXY 42 01/06/2020 07:53 AM    25HYDROXY 33 07/09/2019 07:31 AM          Assessment/Plan:     1. Need for vaccination  Vaccinations were given in office today without any adverse side effects.   - Tdap Vaccine =>6YO IM  - Hepatitis B Vaccine Adult 20+    2. Abdominal bloating  Patient complains of abdominal bloating. I advised the patient to keep a high fiber diet and to drink plenty of water to help with her bloating. Patient agrees to plan of care.      3. Vitamin D deficiency disease  Stable. Under good control with current management of OTC supplements of 2000 units QD. We will obtain new labs to update clinical profile and will adjust therapy as needed.  - VITAMIN D,25 HYDROXY; Future    4. Mixed hyperlipidemia  Stable under current management of Lovastatin 10 mg once daily. Patient had recent labs completed on 1/6/20 and her lipid levels were within normal limits. We will make no changes at  this time.   - TSH; Future  - Comp Metabolic Panel; Future  - Lipid Profile; Future  - CBC WITH DIFFERENTIAL; Future    5. Primary insomnia  Under good control. Continue same regimen. We will make no changes at this time.     6. Chronic pain syndrome- d/t ddd l/s and cervical spine- pain mgt- rx hc/apap qd and gabapentin  Under good control. Continue same regimen.    7. Opiate dependence, continuous (HCC)- pain mgt  Stable. Patient is taking Norco  mg 4 times a day. I instructed the patient to try and get down to only TID. Continue to follow up with pain management regarding medication.     8. DDD (degenerative disc disease), lumbar  See #7   - WESTERGREN SED RATE; Future    Other orders  - dicyclomine (BENTYL) 10 MG Cap; dicyclomine  - beclomethasone (QVAR) 40 MCG/ACT inhaler; Inhale 1 Puff by mouth 2 Times a Day.  - Polyethylene Glycol 1000 Powder; by Does not apply route.      Follow up in 6 months.        James CASAREZ (Stefan), am scribing for, and in the presence of, David Díaz M.D..    Electronically signed by: James Meyers (Stefan), 1/8/2020    David CASAREZ M.D., personally performed the services described in this documentation, as scribed by James Meyers in my presence, and it is both accurate and complete.        Statement Selected

## 2020-01-15 DIAGNOSIS — E78.2 MIXED HYPERLIPIDEMIA: ICD-10-CM

## 2020-01-15 RX ORDER — FENOFIBRATE 134 MG/1
CAPSULE ORAL
Qty: 90 CAP | Refills: 4 | Status: SHIPPED | OUTPATIENT
Start: 2020-01-15 | End: 2021-03-08 | Stop reason: SDUPTHER

## 2020-01-15 RX ORDER — LOVASTATIN 10 MG/1
TABLET ORAL
Qty: 90 TAB | Refills: 4 | Status: SHIPPED | OUTPATIENT
Start: 2020-01-15 | End: 2021-03-08 | Stop reason: SDUPTHER

## 2020-02-14 ENCOUNTER — HOSPITAL ENCOUNTER (OUTPATIENT)
Dept: LAB | Facility: MEDICAL CENTER | Age: 75
End: 2020-02-14
Attending: OBSTETRICS & GYNECOLOGY
Payer: MEDICARE

## 2020-02-14 LAB
APPEARANCE UR: CLEAR
BACTERIA #/AREA URNS HPF: NEGATIVE /HPF
BILIRUB UR QL STRIP.AUTO: NEGATIVE
COLOR UR: YELLOW
EPI CELLS #/AREA URNS HPF: NORMAL /HPF
GLUCOSE UR STRIP.AUTO-MCNC: NEGATIVE MG/DL
KETONES UR STRIP.AUTO-MCNC: NEGATIVE MG/DL
LEUKOCYTE ESTERASE UR QL STRIP.AUTO: ABNORMAL
MICRO URNS: ABNORMAL
NITRITE UR QL STRIP.AUTO: NEGATIVE
PH UR STRIP.AUTO: 6.5 [PH] (ref 5–8)
PROT UR QL STRIP: NEGATIVE MG/DL
RBC # URNS HPF: NORMAL /HPF
RBC UR QL AUTO: NEGATIVE
SP GR UR STRIP.AUTO: 1.02
UROBILINOGEN UR STRIP.AUTO-MCNC: 0.2 MG/DL
WBC #/AREA URNS HPF: NORMAL /HPF

## 2020-02-14 PROCEDURE — 81001 URINALYSIS AUTO W/SCOPE: CPT

## 2020-07-03 ENCOUNTER — HOSPITAL ENCOUNTER (OUTPATIENT)
Dept: LAB | Facility: MEDICAL CENTER | Age: 75
End: 2020-07-03
Attending: INTERNAL MEDICINE
Payer: MEDICARE

## 2020-07-03 DIAGNOSIS — E55.9 VITAMIN D DEFICIENCY DISEASE: ICD-10-CM

## 2020-07-03 DIAGNOSIS — M51.36 DDD (DEGENERATIVE DISC DISEASE), LUMBAR: ICD-10-CM

## 2020-07-03 DIAGNOSIS — E78.2 MIXED HYPERLIPIDEMIA: ICD-10-CM

## 2020-07-03 LAB
25(OH)D3 SERPL-MCNC: 68 NG/ML (ref 30–100)
ALBUMIN SERPL BCP-MCNC: 4.7 G/DL (ref 3.2–4.9)
ALBUMIN/GLOB SERPL: 1.9 G/DL
ALP SERPL-CCNC: 66 U/L (ref 30–99)
ALT SERPL-CCNC: 13 U/L (ref 2–50)
ANION GAP SERPL CALC-SCNC: 13 MMOL/L (ref 7–16)
AST SERPL-CCNC: 20 U/L (ref 12–45)
BASOPHILS # BLD AUTO: 0.9 % (ref 0–1.8)
BASOPHILS # BLD: 0.05 K/UL (ref 0–0.12)
BILIRUB SERPL-MCNC: 0.4 MG/DL (ref 0.1–1.5)
BUN SERPL-MCNC: 15 MG/DL (ref 8–22)
CALCIUM SERPL-MCNC: 9.7 MG/DL (ref 8.5–10.5)
CHLORIDE SERPL-SCNC: 104 MMOL/L (ref 96–112)
CHOLEST SERPL-MCNC: 160 MG/DL (ref 100–199)
CO2 SERPL-SCNC: 24 MMOL/L (ref 20–33)
CREAT SERPL-MCNC: 0.77 MG/DL (ref 0.5–1.4)
EOSINOPHIL # BLD AUTO: 0.3 K/UL (ref 0–0.51)
EOSINOPHIL NFR BLD: 5.5 % (ref 0–6.9)
ERYTHROCYTE [DISTWIDTH] IN BLOOD BY AUTOMATED COUNT: 42.2 FL (ref 35.9–50)
ERYTHROCYTE [SEDIMENTATION RATE] IN BLOOD BY WESTERGREN METHOD: <1 MM/HOUR (ref 0–30)
FASTING STATUS PATIENT QL REPORTED: NORMAL
GLOBULIN SER CALC-MCNC: 2.5 G/DL (ref 1.9–3.5)
GLUCOSE SERPL-MCNC: 92 MG/DL (ref 65–99)
HCT VFR BLD AUTO: 48.1 % (ref 37–47)
HDLC SERPL-MCNC: 57 MG/DL
HGB BLD-MCNC: 15.5 G/DL (ref 12–16)
IMM GRANULOCYTES # BLD AUTO: 0.02 K/UL (ref 0–0.11)
IMM GRANULOCYTES NFR BLD AUTO: 0.4 % (ref 0–0.9)
LDLC SERPL CALC-MCNC: 80 MG/DL
LYMPHOCYTES # BLD AUTO: 2.12 K/UL (ref 1–4.8)
LYMPHOCYTES NFR BLD: 38.5 % (ref 22–41)
MCH RBC QN AUTO: 31 PG (ref 27–33)
MCHC RBC AUTO-ENTMCNC: 32.2 G/DL (ref 33.6–35)
MCV RBC AUTO: 96.2 FL (ref 81.4–97.8)
MONOCYTES # BLD AUTO: 0.42 K/UL (ref 0–0.85)
MONOCYTES NFR BLD AUTO: 7.6 % (ref 0–13.4)
NEUTROPHILS # BLD AUTO: 2.59 K/UL (ref 2–7.15)
NEUTROPHILS NFR BLD: 47.1 % (ref 44–72)
NRBC # BLD AUTO: 0 K/UL
NRBC BLD-RTO: 0 /100 WBC
PLATELET # BLD AUTO: 328 K/UL (ref 164–446)
PMV BLD AUTO: 10.1 FL (ref 9–12.9)
POTASSIUM SERPL-SCNC: 4.4 MMOL/L (ref 3.6–5.5)
PROT SERPL-MCNC: 7.2 G/DL (ref 6–8.2)
RBC # BLD AUTO: 5 M/UL (ref 4.2–5.4)
SODIUM SERPL-SCNC: 141 MMOL/L (ref 135–145)
TRIGL SERPL-MCNC: 116 MG/DL (ref 0–149)
TSH SERPL DL<=0.005 MIU/L-ACNC: 2.67 UIU/ML (ref 0.38–5.33)
WBC # BLD AUTO: 5.5 K/UL (ref 4.8–10.8)

## 2020-07-03 PROCEDURE — 80061 LIPID PANEL: CPT

## 2020-07-03 PROCEDURE — 82306 VITAMIN D 25 HYDROXY: CPT

## 2020-07-03 PROCEDURE — 85652 RBC SED RATE AUTOMATED: CPT

## 2020-07-03 PROCEDURE — 36415 COLL VENOUS BLD VENIPUNCTURE: CPT

## 2020-07-03 PROCEDURE — 80053 COMPREHEN METABOLIC PANEL: CPT

## 2020-07-03 PROCEDURE — 84443 ASSAY THYROID STIM HORMONE: CPT

## 2020-07-03 PROCEDURE — 85025 COMPLETE CBC W/AUTO DIFF WBC: CPT

## 2020-07-08 ENCOUNTER — OFFICE VISIT (OUTPATIENT)
Dept: MEDICAL GROUP | Age: 75
End: 2020-07-08
Payer: MEDICARE

## 2020-07-08 VITALS
SYSTOLIC BLOOD PRESSURE: 102 MMHG | TEMPERATURE: 99.4 F | HEART RATE: 66 BPM | BODY MASS INDEX: 23.01 KG/M2 | OXYGEN SATURATION: 93 % | HEIGHT: 67 IN | DIASTOLIC BLOOD PRESSURE: 62 MMHG | WEIGHT: 146.6 LBS

## 2020-07-08 DIAGNOSIS — Z23 NEED FOR VACCINATION: ICD-10-CM

## 2020-07-08 DIAGNOSIS — J43.1 PANLOBULAR EMPHYSEMA (HCC): ICD-10-CM

## 2020-07-08 DIAGNOSIS — N32.9 BLADDER DISORDER: ICD-10-CM

## 2020-07-08 DIAGNOSIS — M51.36 DDD (DEGENERATIVE DISC DISEASE), LUMBAR: ICD-10-CM

## 2020-07-08 DIAGNOSIS — E78.2 MIXED HYPERLIPIDEMIA: ICD-10-CM

## 2020-07-08 DIAGNOSIS — N95.9 MENOPAUSAL AND POSTMENOPAUSAL DISORDER: ICD-10-CM

## 2020-07-08 DIAGNOSIS — J30.9 CHRONIC ALLERGIC RHINITIS: ICD-10-CM

## 2020-07-08 DIAGNOSIS — G89.4 CHRONIC PAIN SYNDROME: ICD-10-CM

## 2020-07-08 DIAGNOSIS — M50.30 DDD (DEGENERATIVE DISC DISEASE), CERVICAL: ICD-10-CM

## 2020-07-08 DIAGNOSIS — E55.9 VITAMIN D DEFICIENCY: ICD-10-CM

## 2020-07-08 DIAGNOSIS — F51.01 PRIMARY INSOMNIA: ICD-10-CM

## 2020-07-08 DIAGNOSIS — F11.20 OPIATE DEPENDENCE, CONTINUOUS (HCC): ICD-10-CM

## 2020-07-08 DIAGNOSIS — Z00.00 MEDICARE ANNUAL WELLNESS VISIT, SUBSEQUENT: ICD-10-CM

## 2020-07-08 PROCEDURE — 99214 OFFICE O/P EST MOD 30 MIN: CPT | Performed by: INTERNAL MEDICINE

## 2020-07-08 RX ORDER — DICYCLOMINE HYDROCHLORIDE 10 MG/1
CAPSULE ORAL
COMMUNITY
End: 2021-03-08

## 2020-07-08 RX ORDER — FLUCONAZOLE 100 MG/1
TABLET ORAL
COMMUNITY
End: 2020-07-08

## 2020-07-08 RX ORDER — ERYTHROMYCIN 5 MG/G
OINTMENT OPHTHALMIC
COMMUNITY
End: 2021-03-08

## 2020-07-08 RX ORDER — AMOXICILLIN AND CLAVULANATE POTASSIUM 875; 125 MG/1; MG/1
TABLET, FILM COATED ORAL
COMMUNITY
End: 2020-07-08

## 2020-07-08 RX ORDER — ESTRADIOL 0.04 MG/D
PATCH TRANSDERMAL
COMMUNITY

## 2020-07-08 RX ORDER — FLUTICASONE PROPIONATE 50 MCG
SPRAY, SUSPENSION (ML) NASAL
COMMUNITY
End: 2021-03-08

## 2020-07-08 RX ORDER — FOSFOMYCIN TROMETHAMINE 3 G/1
POWDER ORAL
COMMUNITY
End: 2021-03-08

## 2020-07-08 RX ORDER — LOVASTATIN 10 MG/1
TABLET ORAL
COMMUNITY
End: 2021-03-08

## 2020-07-08 RX ORDER — ALBUTEROL SULFATE 90 UG/1
AEROSOL, METERED RESPIRATORY (INHALATION)
COMMUNITY
End: 2021-03-08

## 2020-07-08 RX ORDER — DICLOFENAC POTASSIUM 50 MG/1
TABLET, FILM COATED ORAL
COMMUNITY
End: 2020-07-08

## 2020-07-08 RX ORDER — FLUCONAZOLE 200 MG/1
TABLET ORAL
COMMUNITY
End: 2020-07-08

## 2020-07-08 RX ORDER — PHENAZOPYRIDINE HYDROCHLORIDE 200 MG/1
TABLET, FILM COATED ORAL
COMMUNITY
End: 2020-07-08

## 2020-07-08 RX ORDER — OFLOXACIN 3 MG/ML
SOLUTION/ DROPS OPHTHALMIC
COMMUNITY
End: 2021-03-08

## 2020-07-08 RX ORDER — LEVOFLOXACIN 500 MG/1
TABLET, FILM COATED ORAL
COMMUNITY
End: 2021-03-08

## 2020-07-08 RX ORDER — TIZANIDINE 4 MG/1
TABLET ORAL
COMMUNITY
End: 2021-03-08

## 2020-07-08 RX ORDER — IBUPROFEN 600 MG/1
TABLET ORAL
COMMUNITY
End: 2023-04-12

## 2020-07-08 RX ORDER — ZOSTER VACCINE LIVE 19400 [PFU]/.65ML
INJECTION, POWDER, LYOPHILIZED, FOR SUSPENSION SUBCUTANEOUS
COMMUNITY
End: 2021-03-08

## 2020-07-08 RX ORDER — SULFAMETHOXAZOLE AND TRIMETHOPRIM 400; 80 MG/1; MG/1
TABLET ORAL
COMMUNITY
End: 2021-03-08

## 2020-07-08 RX ORDER — MIRABEGRON 25 MG/1
TABLET, FILM COATED, EXTENDED RELEASE ORAL
COMMUNITY
End: 2021-03-08

## 2020-07-08 RX ORDER — HYDROCODONE BITARTRATE 30 MG/1
TABLET, EXTENDED RELEASE ORAL
COMMUNITY
End: 2021-03-08

## 2020-07-08 RX ORDER — NITROFURANTOIN MACROCRYSTALS 50 MG/1
CAPSULE ORAL
COMMUNITY
End: 2020-07-08

## 2020-07-08 RX ORDER — HYDROCODONE BITARTRATE AND ACETAMINOPHEN 10; 325 MG/1; MG/1
TABLET ORAL
COMMUNITY
End: 2021-03-08

## 2020-07-08 RX ORDER — ZOLPIDEM TARTRATE 10 MG/1
TABLET ORAL
COMMUNITY
End: 2021-03-08 | Stop reason: SDUPTHER

## 2020-07-08 RX ORDER — SULFAMETHOXAZOLE AND TRIMETHOPRIM 800; 160 MG/1; MG/1
TABLET ORAL
COMMUNITY
End: 2023-04-12 | Stop reason: SDUPTHER

## 2020-07-08 RX ORDER — METHOCARBAMOL 750 MG/1
TABLET, FILM COATED ORAL
COMMUNITY
End: 2021-03-08

## 2020-07-08 RX ORDER — ZOLPIDEM TARTRATE 5 MG/1
TABLET ORAL
COMMUNITY
End: 2021-03-08

## 2020-07-08 RX ORDER — MIRABEGRON 50 MG/1
TABLET, FILM COATED, EXTENDED RELEASE ORAL
COMMUNITY
Start: 2020-06-10 | End: 2021-03-08

## 2020-07-08 RX ORDER — METHOCARBAMOL 500 MG/1
TABLET, FILM COATED ORAL
COMMUNITY
End: 2021-03-08

## 2020-07-08 RX ORDER — SUCRALFATE 1 G/1
TABLET ORAL
COMMUNITY
End: 2021-03-08

## 2020-07-08 RX ORDER — OXYCODONE AND ACETAMINOPHEN 10; 325 MG/1; MG/1
TABLET ORAL
COMMUNITY
End: 2021-03-08

## 2020-07-08 RX ORDER — GABAPENTIN 100 MG/1
CAPSULE ORAL
COMMUNITY
End: 2021-03-08

## 2020-07-08 RX ORDER — PREDNISOLONE ACETATE 10 MG/ML
SUSPENSION/ DROPS OPHTHALMIC
COMMUNITY
End: 2021-03-08

## 2020-07-08 RX ORDER — LIDOCAINE 50 MG/G
PATCH TOPICAL
COMMUNITY
End: 2021-03-08

## 2020-07-08 RX ORDER — AZITHROMYCIN 250 MG/1
TABLET, FILM COATED ORAL
COMMUNITY
End: 2020-07-08

## 2020-07-08 RX ORDER — METHOCARBAMOL 750 MG/1
TABLET, FILM COATED ORAL
COMMUNITY
Start: 2020-05-07 | End: 2021-03-08

## 2020-07-08 RX ORDER — TRIAMCINOLONE ACETONIDE 1 MG/G
CREAM TOPICAL
COMMUNITY
End: 2021-03-08

## 2020-07-08 RX ORDER — BENZONATATE 100 MG/1
CAPSULE ORAL
COMMUNITY
End: 2020-07-08

## 2020-07-08 RX ORDER — SUCRALFATE 1 G/1
1 TABLET ORAL
Qty: 120 TAB | Refills: 3 | Status: SHIPPED
Start: 2020-07-08 | End: 2021-03-08

## 2020-07-08 RX ORDER — HYDROCODONE BITARTRATE 20 MG/1
TABLET, EXTENDED RELEASE ORAL
COMMUNITY
End: 2021-03-08

## 2020-07-08 RX ORDER — TOLTERODINE 4 MG/1
CAPSULE, EXTENDED RELEASE ORAL
COMMUNITY
End: 2021-03-08

## 2020-07-08 RX ORDER — GABAPENTIN 100 MG/1
CAPSULE ORAL
COMMUNITY
Start: 2020-05-21 | End: 2023-04-12 | Stop reason: SDUPTHER

## 2020-07-08 ASSESSMENT — ENCOUNTER SYMPTOMS
EYES NEGATIVE: 1
GASTROINTESTINAL NEGATIVE: 1
RESPIRATORY NEGATIVE: 1
MUSCULOSKELETAL NEGATIVE: 1
CARDIOVASCULAR NEGATIVE: 1
PSYCHIATRIC NEGATIVE: 1
NEUROLOGICAL NEGATIVE: 1
CONSTITUTIONAL NEGATIVE: 1
GENERAL WELL-BEING: GOOD

## 2020-07-08 ASSESSMENT — ACTIVITIES OF DAILY LIVING (ADL): BATHING_REQUIRES_ASSISTANCE: 0

## 2020-07-08 ASSESSMENT — PAIN SCALES - GENERAL: PAINLEVEL: 4=SLIGHT-MODERATE PAIN

## 2020-07-08 ASSESSMENT — FIBROSIS 4 INDEX: FIB4 SCORE: 1.3

## 2020-07-08 ASSESSMENT — PATIENT HEALTH QUESTIONNAIRE - PHQ9: CLINICAL INTERPRETATION OF PHQ2 SCORE: 0

## 2020-07-08 NOTE — PROGRESS NOTES
Subjective:      Berenice Mart is a 75 y.o. female who presents with Annual Wellness Visit  and  The patient is here for followup of chronic medical problems listed below. The patient is compliant with medications and having no side effects from them. Denies chest pain, abdominal pain, dyspnea, myalgias, or cough.   Patient Active Problem List    Diagnosis Date Noted   • Primary insomnia 01/04/2018   • Chronic pain syndrome- d/t ddd l/s and cervical spine- pain mgt- rx hc/apap qd and gabapentin 01/04/2018   • Opiate dependence, continuous (HCC)- pain mgt 01/04/2018   • Bladder disorder- [facility] -dr. chin (gyn) 05/20/2015   • Chronic allergic rhinitis 04/03/2014   • Vitamin D deficiency disease 12/17/2013   • COPD (chronic obstructive pulmonary disease) ([facility]) 12/17/2013   • Mixed hyperlipidemia 12/01/2011   • DDD (degenerative disc disease), lumbar 12/01/2011   • DDD (degenerative disc disease), cervical 12/01/2011   • Menopausal and postmenopausal disorder 12/01/2011     Allergies   Allergen Reactions   • Codeine    • Morphine Hcl Nausea     Dr Chin aware     Outpatient Medications Prior to Visit   Medication Sig Dispense Refill   • erythromycin 5 MG/GM Ointment erythromycin 5 mg/gram (0.5 %) eye ointment     • estradiol (CLIMARA) 0.0375 MG/24HR patch estradiol 0.0375 mg/24 hr weekly transdermal patch     • fluticasone-salmeterol (ADVAIR DISKUS) 250-50 MCG/DOSE AEROSOL POWDER, BREATH ACTIVATED Advair Diskus 250 mcg-50 mcg/dose powder for inhalation     • fosfomycin (MONUROL) 3 GM Pack Monurol 3 gram oral packet     • gabapentin (NEURONTIN) 100 MG Cap      • gabapentin (NEURONTIN) 100 MG Cap gabapentin 100 mg capsule     • HYDROcodone Bitartrate ER (HYSINGLA ER) 20 MG Tablet Extended Release 24 hour Abuse-Deterrent Hysingla ER 20 mg tablet, crush resistant, extended release     • HYDROcodone Bitartrate ER (HYSINGLA ER) 30 MG Tablet Extended Release 24 hour Abuse-Deterrent Hysingla ER 30 mg tablet, crush  resistant, extended release     • ibuprofen (MOTRIN) 600 MG Tab ibuprofen 600 mg tablet     • Influenza Vaccine High-Dose pf 0.5 ML Suspension Prefilled Syringe injection Fluzone High-Dose 2015-16 (PF) 180 mcg/0.5 mL intramuscular syringe     • levoFLOXacin (LEVAQUIN) 500 MG tablet levofloxacin 500 mg tablet     • lidocaine (LIDODERM) 5 % Patch lidocaine 5 % topical patch   ZEKE 1 PATCH UTD ON THE SKIN Q 12 H     • ofloxacin (OCUFLOX) 0.3 % Solution ofloxacin 0.3 % eye drops     • oxyCODONE-acetaminophen (PERCOCET-10)  MG Tab oxycodone-acetaminophen 10 mg-325 mg tablet     • prednisoLONE acetate (PRED FORTE) 1 % Suspension prednisolone acetate 1 % eye drops,suspension     • rivaroxaban (XARELTO) 10 MG Tab tablet Xarelto 10 mg tablet     • tizanidine (ZANAFLEX) 4 MG Tab tizanidine 4 mg tablet     • tolterodine ER (DETROL LA) 4 MG CAPSULE SR 24 HR tolterodine ER 4 mg capsule,extended release 24 hr     • triamcinolone acetonide (KENALOG) 0.1 % Cream triamcinolone acetonide 0.1 % topical cream     • zolpidem (AMBIEN) 10 MG Tab zolpidem 10 mg tablet     • zolpidem (AMBIEN) 5 MG Tab zolpidem 5 mg tablet     • Zoster Vaccine Live (ZOSTAVAX) 31044 UNT/0.65ML Recon Susp Zostavax (PF) 19,400 unit/0.65 mL subcutaneous suspension   ADM 0.65ML SC UTD     • albuterol (PROAIR HFA) 108 (90 Base) MCG/ACT Aero Soln inhalation aerosol ProAir HFA 90 mcg/actuation aerosol inhaler     • dicyclomine (BENTYL) 10 MG Cap dicyclomine 10 mg capsule     • fluticasone (FLONASE) 50 MCG/ACT nasal spray fluticasone 50 mcg/actuation nasal spray,suspension   SHAKE LQ AND U 2 SPRAYS IEN QD     • fluticasone (FLONASE) 50 MCG/ACT nasal spray fluticasone propionate 50 mcg/actuation nasal spray,suspension   SHAKE LQ AND U 2 SPRAYS IEN QD     • HYDROcodone/acetaminophen (NORCO)  MG Tab hydrocodone 10 mg-acetaminophen 325 mg tablet     • methocarbamol (ROBAXIN) 500 MG Tab methocarbamol 500 mg tablet     • lovastatin (MEVACOR) 10 MG tablet  lovastatin 10 mg tablet     • methocarbamol (ROBAXIN) 750 MG Tab methocarbamol 750 mg tablet     • methocarbamol (ROBAXIN) 750 MG Tab      • sucralfate (CARAFATE) 1 GM Tab sucralfate 1 gram tablet     • Mirabegron ER (MYRBETRIQ) 25 MG TABLET SR 24 HR Myrbetriq 25 mg tablet,extended release     • MYRBETRIQ 50 MG TABLET SR 24 HR      • sulfamethoxazole-trimethoprim (BACTRIM) 400-80 MG Tab sulfamethoxazole 400 mg-trimethoprim 80 mg tablet     • sulfamethoxazole-trimethoprim (BACTRIM DS) 800-160 MG tablet sulfamethoxazole 800 mg-trimethoprim 160 mg tablet   TK 1 T PO  BID     • amoxicillin-clavulanate (AUGMENTIN) 875-125 MG Tab amoxicillin 875 mg-potassium clavulanate 125 mg tablet     • azithromycin (ZITHROMAX) 250 MG Tab azithromycin 250 mg tablet     • benzonatate (TESSALON) 100 MG Cap benzonatate 100 mg capsule     • diclofenac (CATAFLAM) 50 MG tablet diclofenac potassium 50 mg tablet     • fluconazole (DIFLUCAN) 100 MG Tab fluconazole 100 mg tablet     • fluconazole (DIFLUCAN) 200 MG Tab fluconazole 200 mg tablet     • Influenza Vaccine High-Dose pf 0.5 ML Suspension Prefilled Syringe injection Fluzone High-Dose 0708-3393 (PF) 180 mcg/0.5 mL intramuscular syringe   ADM 0.5ML IM UTD     • nitrofurantoin (MACRODANTIN) 50 MG Cap nitrofurantoin macrocrystal 50 mg capsule     • phenazopyridine (PYRIDIUM) 200 MG Tab phenazopyridine 200 mg tablet   TK 1 T PO TID     • lovastatin (MEVACOR) 10 MG tablet TAKE ONE TABLET BY MOUTH ONE TIME DAILY  90 Tab 4   • fenofibrate micronized (LOFIBRA) 134 MG capsule TAKE ONE CAPSULE BY MOUTH IN THE MORNING BEFORE BREAKFAST. 90 Cap 4   • dicyclomine (BENTYL) 10 MG Cap dicyclomine     • beclomethasone (QVAR) 40 MCG/ACT inhaler Inhale 1 Puff by mouth 2 Times a Day.     • Polyethylene Glycol 1000 Powder by Does not apply route.     • HYDROcodone/acetaminophen (NORCO)  MG Tab hydrocodone 10 mg-acetaminophen 325 mg tablet     • methocarbamol (ROBAXIN) 500 MG Tab methocarbamol 500 mg  tablet     • sucralfate (CARAFATE) 1 GM Tab sucralfate 1 gram tablet   Take 1 tablet 3 times a day by oral route for 30 days.     • sulfamethoxazole-trimethoprim (BACTRIM) 400-80 MG Tab sulfamethoxazole 400 mg-trimethoprim 80 mg tablet     • albuterol (PROAIR HFA) 108 (90 Base) MCG/ACT Aero Soln inhalation aerosol Inhale 2 Puffs by mouth every 6 hours as needed for Shortness of Breath. 8.5 g 11   • fluticasone (FLONASE) 50 MCG/ACT nasal spray Spray 2 Sprays in nose every day. 16 g 12   • Mirabegron ER (MYRBETRIQ) 25 MG TABLET SR 24 HR Take 1 Each by mouth 2 Times a Day. 180 Tab 4     No facility-administered medications prior to visit.      Hospital Outpatient Visit on 07/03/2020   Component Date Value   • Sed Rate Westergren 07/03/2020 <1    • TSH 07/03/2020 2.670    • Sodium 07/03/2020 141    • Potassium 07/03/2020 4.4    • Chloride 07/03/2020 104    • Co2 07/03/2020 24    • Anion Gap 07/03/2020 13.0    • Glucose 07/03/2020 92    • Bun 07/03/2020 15    • Creatinine 07/03/2020 0.77    • Calcium 07/03/2020 9.7    • AST(SGOT) 07/03/2020 20    • ALT(SGPT) 07/03/2020 13    • Alkaline Phosphatase 07/03/2020 66    • Total Bilirubin 07/03/2020 0.4    • Albumin 07/03/2020 4.7    • Total Protein 07/03/2020 7.2    • Globulin 07/03/2020 2.5    • A-G Ratio 07/03/2020 1.9    • Cholesterol,Tot 07/03/2020 160    • Triglycerides 07/03/2020 116    • HDL 07/03/2020 57    • LDL 07/03/2020 80    • WBC 07/03/2020 5.5    • RBC 07/03/2020 5.00    • Hemoglobin 07/03/2020 15.5    • Hematocrit 07/03/2020 48.1*   • MCV 07/03/2020 96.2    • MCH 07/03/2020 31.0    • MCHC 07/03/2020 32.2*   • RDW 07/03/2020 42.2    • Platelet Count 07/03/2020 328    • MPV 07/03/2020 10.1    • Neutrophils-Polys 07/03/2020 47.10    • Lymphocytes 07/03/2020 38.50    • Monocytes 07/03/2020 7.60    • Eosinophils 07/03/2020 5.50    • Basophils 07/03/2020 0.90    • Immature Granulocytes 07/03/2020 0.40    • Nucleated RBC 07/03/2020 0.00    • Neutrophils (Absolute)  07/03/2020 2.59    • Lymphs (Absolute) 07/03/2020 2.12    • Monos (Absolute) 07/03/2020 0.42    • Eos (Absolute) 07/03/2020 0.30    • Baso (Absolute) 07/03/2020 0.05    • Immature Granulocytes (a* 07/03/2020 0.02    • NRBC (Absolute) 07/03/2020 0.00    • 25-Hydroxy   Vitamin D 25 07/03/2020 68    • Fasting Status 07/03/2020 Fasting    • GFR If  07/03/2020 >60    • GFR If Non  [employer]* 07/03/2020 >60       Lab Results   Component Value Date/Time    HBA1C 6.0 (H) 05/04/2016 07:58 AM     Lab Results   Component Value Date/Time    SODIUM 141 07/03/2020 07:40 AM    POTASSIUM 4.4 07/03/2020 07:40 AM    CHLORIDE 104 07/03/2020 07:40 AM    CO2 24 07/03/2020 07:40 AM    GLUCOSE 92 07/03/2020 07:40 AM    BUN 15 07/03/2020 07:40 AM    CREATININE 0.77 07/03/2020 07:40 AM    CREATININE 0.7 08/17/2006 08:15 AM    ALKPHOSPHAT 66 07/03/2020 07:40 AM    ASTSGOT 20 07/03/2020 07:40 AM    ALTSGPT 13 07/03/2020 07:40 AM    TBILIRUBIN 0.4 07/03/2020 07:40 AM     No results found for: INR  Lab Results   Component Value Date/Time    CHOLSTRLTOT 160 07/03/2020 07:40 AM    LDL 80 07/03/2020 07:40 AM    HDL 57 07/03/2020 07:40 AM    TRIGLYCERIDE 116 07/03/2020 07:40 AM       No results found for: TESTOSTERONE  No results found for: TSH  Lab Results   Component Value Date/Time    FREET4 0.80 04/26/2018 12:25 PM    FREET4 0.95 03/24/2014 07:48 AM     Lab Results   Component Value Date/Time    URICACID 3.4 05/14/2012 07:53 AM     No components found for: VITB12  Lab Results   Component Value Date/Time    25HYDROXY 68 07/03/2020 07:40 AM    25HYDROXY 42 01/06/2020 07:53 AM               HPI    Review of Systems   Constitutional: Negative.    HENT: Negative.    Eyes: Negative.    Respiratory: Negative.    Cardiovascular: Negative.    Gastrointestinal: Negative.    Genitourinary: Negative.    Musculoskeletal: Negative.    Skin: Negative.    Neurological: Negative.    Endo/Heme/Allergies: Negative.    Psychiatric/Behavioral:  "Negative.           Objective:     /62 (BP Location: Right arm, Patient Position: Sitting, BP Cuff Size: Adult)   Pulse 66   Temp 37.4 °C (99.4 °F) (Temporal)   Ht 1.702 m (5' 7\")   Wt 66.5 kg (146 lb 9.6 oz)   LMP 12/01/1976   SpO2 93%   BMI 22.96 kg/m²      Physical Exam  Vitals signs reviewed.   Constitutional:       General: She is not in acute distress.     Appearance: She is well-developed. She is not diaphoretic.   HENT:      Head: Normocephalic and atraumatic.      Right Ear: External ear normal.      Left Ear: External ear normal.      Nose: Nose normal.      Mouth/Throat:      Pharynx: No oropharyngeal exudate.   Eyes:      General: No scleral icterus.        Right eye: No discharge.         Left eye: No discharge.      Conjunctiva/sclera: Conjunctivae normal.      Pupils: Pupils are equal, round, and reactive to light.   Neck:      Musculoskeletal: Normal range of motion and neck supple.      Thyroid: No thyromegaly.      Vascular: No JVD.      Trachea: No tracheal deviation.   Cardiovascular:      Rate and Rhythm: Normal rate and regular rhythm.      Heart sounds: Normal heart sounds. No murmur. No friction rub. No gallop.    Pulmonary:      Effort: Pulmonary effort is normal. No respiratory distress.      Breath sounds: Normal breath sounds. No stridor. No wheezing or rales.   Chest:      Chest wall: No tenderness.   Abdominal:      General: Bowel sounds are normal. There is no distension.      Palpations: Abdomen is soft. There is no mass.      Tenderness: There is no abdominal tenderness. There is no guarding or rebound.   Musculoskeletal: Normal range of motion.         General: No tenderness.   Lymphadenopathy:      Cervical: No cervical adenopathy.   Skin:     General: Skin is warm and dry.      Coloration: Skin is not pale.      Findings: No erythema or rash.   Neurological:      Mental Status: She is alert and oriented to person, place, and time.      Cranial Nerves: No cranial nerve " deficit.      Motor: No abnormal muscle tone.      Coordination: Coordination normal.      Deep Tendon Reflexes: Reflexes are normal and symmetric. Reflexes normal.   Psychiatric:         Behavior: Behavior normal.         Thought Content: Thought content normal.         Judgment: Judgment normal.                 Assessment/Plan:        1. Medicare annual wellness visit, subsequent      Under good control. Continue same regimen.  - Subsequent Annual Wellness Visit - Includes PPPS ()    2. Need for vaccination      Under good control. Continue same regimen.  - Hepatitis B Vaccine Adult IM  - Subsequent Annual Wellness Visit - Includes PPPS ()    3. Primary insomnia      Under good control. Continue same regimen.  - Subsequent Annual Wellness Visit - Includes PPPS ()    4. Vitamin D deficiency disease       Under good control. Continue same regimen.  - Subsequent Annual Wellness Visit - Includes PPPS ()    5. Opiate dependence, continuous (HCC)- pain mgt      Under good control. Continue same regimen.  - Subsequent Annual Wellness Visit - Includes PPPS ()    6. Bladder disorder- [facility] -dr. willoughby (gyn)      Under good control. Continue same regimen.  - Subsequent Annual Wellness Visit - Includes PPPS ()    7. Chronic allergic rhinitis   Under good control. Continue same regimen.     - Subsequent Annual Wellness Visit - Includes PPPS ()    8. Chronic pain syndrome- d/t ddd l/s and cervical spine- pain mgt- rx hc/apap qd and gabapentin   Under good control. Continue same regimen.     - Subsequent Annual Wellness Visit - Includes PPPS ()    9. Panlobular emphysema (HCC)   Under good control. Continue same regimen.      - Subsequent Annual Wellness Visit - Includes PPPS ()    10. DDD (degenerative disc disease), cervical   Under good control. Continue same regimen.       - Subsequent Annual Wellness Visit - Includes PPPS ()    11. DDD (degenerative disc disease), lumbar       Under good control. Continue same regimen.  - Subsequent Annual Wellness Visit - Includes PPPS ()    12. Menopausal and postmenopausal disorder      Under good control. Continue same regimen.  - Subsequent Annual Wellness Visit - Includes PPPS ()    13. Mixed hyperlipidemia     Under good control. Continue same regimen.    - Subsequent Annual Wellness Visit - Includes PPPS ()

## 2020-07-08 NOTE — PROGRESS NOTES
Chief Complaint   Patient presents with   • Annual Wellness Visit         HPI:  Berenice is a 75 y.o. here for Medicare Annual Wellness Visit        Patient Active Problem List    Diagnosis Date Noted   • Primary insomnia 01/04/2018   • Chronic pain syndrome- d/t ddd l/s and cervical spine- pain mgt- rx hc/apap qd and gabapentin 01/04/2018   • Opiate dependence, continuous (HCC)- pain mgt 01/04/2018   • Bladder disorder- O[facility] -dr. willoughby (gyn) 05/20/2015   • Chronic allergic rhinitis 04/03/2014   • Vitamin D deficiency disease 12/17/2013   • COPD (chronic obstructive pulmonary disease) ([facility]) 12/17/2013   • Mixed hyperlipidemia 12/01/2011   • DDD (degenerative disc disease), lumbar 12/01/2011   • DDD (degenerative disc disease), cervical 12/01/2011   • Menopausal and postmenopausal disorder 12/01/2011       Current Outpatient Medications   Medication Sig Dispense Refill   • amoxicillin-clavulanate (AUGMENTIN) 875-125 MG Tab amoxicillin 875 mg-potassium clavulanate 125 mg tablet     • azithromycin (ZITHROMAX) 250 MG Tab azithromycin 250 mg tablet     • benzonatate (TESSALON) 100 MG Cap benzonatate 100 mg capsule     • diclofenac (CATAFLAM) 50 MG tablet diclofenac potassium 50 mg tablet     • erythromycin 5 MG/GM Ointment erythromycin 5 mg/gram (0.5 %) eye ointment     • estradiol (CLIMARA) 0.0375 MG/24HR patch estradiol 0.0375 mg/24 hr weekly transdermal patch     • fluconazole (DIFLUCAN) 100 MG Tab fluconazole 100 mg tablet     • fluconazole (DIFLUCAN) 200 MG Tab fluconazole 200 mg tablet     • fluticasone-salmeterol (ADVAIR DISKUS) 250-50 MCG/DOSE AEROSOL POWDER, BREATH ACTIVATED Advair Diskus 250 mcg-50 mcg/dose powder for inhalation     • fosfomycin (MONUROL) 3 GM Pack Monurol 3 gram oral packet     • gabapentin (NEURONTIN) 100 MG Cap      • gabapentin (NEURONTIN) 100 MG Cap gabapentin 100 mg capsule     • HYDROcodone Bitartrate ER (HYSINGLA ER) 20 MG Tablet Extended Release 24 hour Abuse-Deterrent Hysingla ER  20 mg tablet, crush resistant, extended release     • HYDROcodone Bitartrate ER (HYSINGLA ER) 30 MG Tablet Extended Release 24 hour Abuse-Deterrent Hysingla ER 30 mg tablet, crush resistant, extended release     • ibuprofen (MOTRIN) 600 MG Tab ibuprofen 600 mg tablet     • Influenza Vaccine High-Dose pf 0.5 ML Suspension Prefilled Syringe injection Fluzone High-Dose 2015-16 (PF) 180 mcg/0.5 mL intramuscular syringe     • Influenza Vaccine High-Dose pf 0.5 ML Suspension Prefilled Syringe injection Fluzone High-Dose 5762-9477 (PF) 180 mcg/0.5 mL intramuscular syringe   ADM 0.5ML IM UTD     • levoFLOXacin (LEVAQUIN) 500 MG tablet levofloxacin 500 mg tablet     • lidocaine (LIDODERM) 5 % Patch lidocaine 5 % topical patch   ZEKE 1 PATCH UTD ON THE SKIN Q 12 H     • nitrofurantoin (MACRODANTIN) 50 MG Cap nitrofurantoin macrocrystal 50 mg capsule     • ofloxacin (OCUFLOX) 0.3 % Solution ofloxacin 0.3 % eye drops     • oxyCODONE-acetaminophen (PERCOCET-10)  MG Tab oxycodone-acetaminophen 10 mg-325 mg tablet     • phenazopyridine (PYRIDIUM) 200 MG Tab phenazopyridine 200 mg tablet   TK 1 T PO TID     • prednisoLONE acetate (PRED FORTE) 1 % Suspension prednisolone acetate 1 % eye drops,suspension     • rivaroxaban (XARELTO) 10 MG Tab tablet Xarelto 10 mg tablet     • tizanidine (ZANAFLEX) 4 MG Tab tizanidine 4 mg tablet     • tolterodine ER (DETROL LA) 4 MG CAPSULE SR 24 HR tolterodine ER 4 mg capsule,extended release 24 hr     • triamcinolone acetonide (KENALOG) 0.1 % Cream triamcinolone acetonide 0.1 % topical cream     • zolpidem (AMBIEN) 10 MG Tab zolpidem 10 mg tablet     • zolpidem (AMBIEN) 5 MG Tab zolpidem 5 mg tablet     • Zoster Vaccine Live (ZOSTAVAX) 31858 UNT/0.65ML Recon Susp Zostavax (PF) 19,400 unit/0.65 mL subcutaneous suspension   ADM 0.65ML SC UTD     • albuterol (PROAIR HFA) 108 (90 Base) MCG/ACT Aero Soln inhalation aerosol ProAir HFA 90 mcg/actuation aerosol inhaler     • dicyclomine (BENTYL) 10 MG  Cap dicyclomine 10 mg capsule     • fluticasone (FLONASE) 50 MCG/ACT nasal spray fluticasone 50 mcg/actuation nasal spray,suspension   SHAKE LQ AND U 2 SPRAYS IEN QD     • fluticasone (FLONASE) 50 MCG/ACT nasal spray fluticasone propionate 50 mcg/actuation nasal spray,suspension   SHAKE LQ AND U 2 SPRAYS IEN QD     • HYDROcodone/acetaminophen (NORCO)  MG Tab hydrocodone 10 mg-acetaminophen 325 mg tablet     • methocarbamol (ROBAXIN) 500 MG Tab methocarbamol 500 mg tablet     • lovastatin (MEVACOR) 10 MG tablet lovastatin 10 mg tablet     • methocarbamol (ROBAXIN) 750 MG Tab methocarbamol 750 mg tablet     • methocarbamol (ROBAXIN) 750 MG Tab      • sucralfate (CARAFATE) 1 GM Tab sucralfate 1 gram tablet     • Mirabegron ER (MYRBETRIQ) 25 MG TABLET SR 24 HR Myrbetriq 25 mg tablet,extended release     • MYRBETRIQ 50 MG TABLET SR 24 HR      • sulfamethoxazole-trimethoprim (BACTRIM) 400-80 MG Tab sulfamethoxazole 400 mg-trimethoprim 80 mg tablet     • sulfamethoxazole-trimethoprim (BACTRIM DS) 800-160 MG tablet sulfamethoxazole 800 mg-trimethoprim 160 mg tablet   TK 1 T PO  BID     • lovastatin (MEVACOR) 10 MG tablet TAKE ONE TABLET BY MOUTH ONE TIME DAILY  90 Tab 4   • fenofibrate micronized (LOFIBRA) 134 MG capsule TAKE ONE CAPSULE BY MOUTH IN THE MORNING BEFORE BREAKFAST. 90 Cap 4   • dicyclomine (BENTYL) 10 MG Cap dicyclomine     • beclomethasone (QVAR) 40 MCG/ACT inhaler Inhale 1 Puff by mouth 2 Times a Day.     • Polyethylene Glycol 1000 Powder by Does not apply route.     • HYDROcodone/acetaminophen (NORCO)  MG Tab hydrocodone 10 mg-acetaminophen 325 mg tablet     • methocarbamol (ROBAXIN) 500 MG Tab methocarbamol 500 mg tablet     • sucralfate (CARAFATE) 1 GM Tab sucralfate 1 gram tablet   Take 1 tablet 3 times a day by oral route for 30 days.     • sulfamethoxazole-trimethoprim (BACTRIM) 400-80 MG Tab sulfamethoxazole 400 mg-trimethoprim 80 mg tablet     • albuterol (PROAIR HFA) 108 (90 Base)  MCG/ACT Aero Soln inhalation aerosol Inhale 2 Puffs by mouth every 6 hours as needed for Shortness of Breath. 8.5 g 11   • fluticasone (FLONASE) 50 MCG/ACT nasal spray Spray 2 Sprays in nose every day. 16 g 12   • Mirabegron ER (MYRBETRIQ) 25 MG TABLET SR 24 HR Take 1 Each by mouth 2 Times a Day. 180 Tab 4     No current facility-administered medications for this visit.         Patient is taking medications as noted in medication list.  Current supplements as per medication list.     Allergies: Codeine and Morphine hcl    Current social contact/activities: Walking, Visits with neighbors and family    Is patient current with immunizations? No, due for HEPATITIS B. Patient is interested in receiving HEPATITIS B today.    She  reports that she has never smoked. She has never used smokeless tobacco. She reports current alcohol use of about 0.6 oz of alcohol per week. She reports that she does not use drugs.  Counseling given: Not Answered        DPA/Advanced directive: Patient does not have an Advanced Directive.  A packet and workshop information was given on Advanced Directives.    ROS:    Gait: Uses no assistive device   Ostomy: No   Other tubes: No   Amputations: No   Chronic oxygen use No   Last eye exam: Sept. 2019  Wears hearing aids: No   : Reports urinary leakage during the last 6 months that has not interfered at all with their daily activities or sleep.      Screening:        Depression Screening    Little interest or pleasure in doing things?  0 - not at all  Feeling down, depressed, or hopeless? 0 - not at all  Patient Health Questionnaire Score: 0    If depressive symptoms identified deferred to follow up visit unless specifically addressed in assessment and plan.    Interpretation of PHQ-9 Total Score   Score Severity   1-4 No Depression   5-9 Mild Depression   10-14 Moderate Depression   15-19 Moderately Severe Depression   20-27 Severe Depression    Screening for Cognitive Impairment    Three Minute  Recall (river, nation, finger)  3/3 Finger, nation, river  Jin clock face with all 12 numbers and set the hands to show 10 past 11.  Yes 5/5  If cognitive concerns identified, deferred for follow up unless specifically addressed in assessment and plan.    Fall Risk Assessment    Has the patient had two or more falls in the last year or any fall with injury in the last year?  No  If fall risk identified, deferred for follow up unless specifically addressed in assessment and plan.    Safety Assessment    Throw rugs on floor.  Yes  Handrails on all stairs.  Yes  Good lighting in all hallways.  Yes  Difficulty hearing.  No  Patient counseled about all safety risks that were identified.    Functional Assessment ADLs    Are there any barriers preventing you from cooking for yourself or meeting nutritional needs?  No.    Are there any barriers preventing you from driving safely or obtaining transportation?  No.    Are there any barriers preventing you from using a telephone or calling for help?  No.    Are there any barriers preventing you from shopping?  No.    Are there any barriers preventing you from taking care of your own finances?  No.    Are there any barriers preventing you from managing your medications?  No.    Are there any barriers preventing you from showering, bathing or dressing yourself?  No.    Are you currently engaging in any exercise or physical activity?  Yes.  Walking, household chores, gardening  What is your perception of your health?  Good.    Health Maintenance Summary                Annual Pulmonary Function Test / Spirometry Overdue 5/22/1951     IMM HEP B VACCINE Overdue 5/22/1962      Done 1/8/2020 Imm Admin: Hepatitis B Vaccine Recombivax (Adol/Adult)    Annual Wellness Visit Overdue 1/5/2019      Done 1/4/2018 Visit Dx: Medicare annual wellness visit, subsequent     Patient has more history with this topic...    IMM INFLUENZA Next Due 9/1/2020      Done 10/10/2019 Ext Imm: Influenza Tri,  Adj     Patient has more history with this topic...    MAMMOGRAM Next Due 11/18/2020      Done 11/18/2019 MA-SCREENING MAMMO BILAT W/TOMOSYNTHESIS W/CAD     Patient has more history with this topic...    BONE DENSITY Next Due 1/25/2024      Done 1/25/2019 DS-BONE DENSITY STUDY (DEXA)     Patient has more history with this topic...    COLONOSCOPY Next Due 10/12/2027      Done 10/12/2017 REFERRAL TO GI FOR COLONOSCOPY     Patient has more history with this topic...    IMM DTaP/Tdap/Td Vaccine Next Due 1/8/2030      Done 1/8/2020 Imm Admin: Tdap Vaccine          Patient Care Team:  David Díaz M.D. as PCP - General  Kalie Chin M.D. as Consulting Physician (OB/Gyn)  Eliu Elena M.D. as Consulting Physician (Anesthesia)  Clifton Hernandez O.D. as Consulting Physician (Optometry)  Rajendra aDniel M.D. as Consulting Physician (Orthopaedics)    Social History     Tobacco Use   • Smoking status: Never Smoker   • Smokeless tobacco: Never Used   Substance Use Topics   • Alcohol use: Yes     Alcohol/week: 0.6 oz     Types: 1 Glasses of wine per week     Frequency: Monthly or less     Drinks per session: 1 or 2     Binge frequency: Never     Comment: occassionally   • Drug use: No     Family History   Problem Relation Age of Onset   • Cancer Paternal Aunt    • Heart Disease Mother    • GI Disease Father    • Cancer Father    • No Known Problems Maternal Grandmother    • No Known Problems Maternal Grandfather    • No Known Problems Paternal Grandmother    • No Known Problems Paternal Grandfather      She  has a past medical history of Anesthesia, Arthritis, Hypertriglyceridemia, Other specified symptom associated with female genital organs, Pain, Unspecified urinary incontinence, and Urinary bladder disorder.   Past Surgical History:   Procedure Laterality Date   • KNEE ARTHROPLASTY TOTAL Right 1/2015    dr awan at [facility]   • BLADDER SLING FEMALE  12/29/2009    Performed by KALIE CHIN at SURGERY  "SAME DAY [facility]S   • RECTOCELE REPAIR  12/29/2009    [facility]   • CYSTOCELE REPAIR  12/29/2009    Performed by SKYLER ORTIZ at SURGERY SAME DAY [facility]   • CYSTOSCOPY  12/29/2009    Performed by SKYLER ORTIZ at SURGERY SAME DAY [facility]   • VAGINAL SUSPENSION  12/29/2009    Performed by SKYLER ORTIZ at SURGERY SAME DAY [facility]   • COLONOSCOPY-FLEXIBLE  2009    gic-neg   • CERVICAL FUSION POSTERIOR     • HYSTERECTOMY RADICAL     • HYSTEROSCOPY W/V     • PB LAMINOTOMY,LUMBAR DISK,1 INTRSP             Exam:     /62 (BP Location: Right arm, Patient Position: Sitting, BP Cuff Size: Adult)   Pulse 66   Temp 37.4 °C (99.4 °F) (Temporal)   Ht 1.702 m (5' 7\")   Wt 66.5 kg (146 lb 9.6 oz)   SpO2 93%  Body mass index is 22.96 kg/m².    Hearing excellent.    Dentition good  Alert, oriented in no acute distress.  Eye contact is good, speech goal directed, affect calm       Assessment and Plan. The following treatment and monitoring plan is recommended:     No diagnosis found.      Services suggested: No services needed at this time    Health Care Screening recommendations as per orders if indicated.  Referrals offered: PT/OT/Nutrition counseling/Behavioral Health/Smoking cessation as per orders if indicated.    Discussion today about general wellness and lifestyle habits:    · Prevent falls and reduce trip hazards; Cautioned about securing or removing rugs.  · Have a working fire alarm and carbon monoxide detector;   · Engage in regular physical activity and social activities       Follow-up: No follow-ups on file.  "

## 2020-12-07 ENCOUNTER — HOSPITAL ENCOUNTER (OUTPATIENT)
Dept: LAB | Facility: MEDICAL CENTER | Age: 75
End: 2020-12-07
Attending: ANESTHESIOLOGY
Payer: MEDICARE

## 2020-12-07 ENCOUNTER — HOSPITAL ENCOUNTER (OUTPATIENT)
Dept: RADIOLOGY | Facility: MEDICAL CENTER | Age: 75
End: 2020-12-07
Attending: ANESTHESIOLOGY
Payer: MEDICARE

## 2020-12-07 ENCOUNTER — HOSPITAL ENCOUNTER (OUTPATIENT)
Dept: CARDIOLOGY | Facility: MEDICAL CENTER | Age: 75
End: 2020-12-07
Attending: ANESTHESIOLOGY
Payer: MEDICARE

## 2020-12-07 DIAGNOSIS — Z01.810 PRE-OPERATIVE CARDIOVASCULAR EXAMINATION: ICD-10-CM

## 2020-12-07 DIAGNOSIS — Z01.89 DIAGNOSTIC SKIN AND SENSITIZATION TESTS: ICD-10-CM

## 2020-12-07 LAB
ANION GAP SERPL CALC-SCNC: 13 MMOL/L (ref 7–16)
APPEARANCE UR: CLEAR
APTT PPP: 36 SEC (ref 24.7–36)
BACTERIA #/AREA URNS HPF: NEGATIVE /HPF
BASOPHILS # BLD AUTO: 0.6 % (ref 0–1.8)
BASOPHILS # BLD: 0.03 K/UL (ref 0–0.12)
BILIRUB UR QL STRIP.AUTO: NEGATIVE
BUN SERPL-MCNC: 16 MG/DL (ref 8–22)
CALCIUM SERPL-MCNC: 10.1 MG/DL (ref 8.5–10.5)
CHLORIDE SERPL-SCNC: 105 MMOL/L (ref 96–112)
CO2 SERPL-SCNC: 24 MMOL/L (ref 20–33)
COLOR UR: YELLOW
CREAT SERPL-MCNC: 0.77 MG/DL (ref 0.5–1.4)
EKG IMPRESSION: NORMAL
EOSINOPHIL # BLD AUTO: 0.2 K/UL (ref 0–0.51)
EOSINOPHIL NFR BLD: 3.7 % (ref 0–6.9)
EPI CELLS #/AREA URNS HPF: ABNORMAL /HPF
ERYTHROCYTE [DISTWIDTH] IN BLOOD BY AUTOMATED COUNT: 41.8 FL (ref 35.9–50)
GLUCOSE SERPL-MCNC: 94 MG/DL (ref 65–99)
GLUCOSE UR STRIP.AUTO-MCNC: NEGATIVE MG/DL
HCT VFR BLD AUTO: 47.3 % (ref 37–47)
HGB BLD-MCNC: 15.3 G/DL (ref 12–16)
HYALINE CASTS #/AREA URNS LPF: ABNORMAL /LPF
IMM GRANULOCYTES # BLD AUTO: 0.02 K/UL (ref 0–0.11)
IMM GRANULOCYTES NFR BLD AUTO: 0.4 % (ref 0–0.9)
INR PPP: 1.03 (ref 0.87–1.13)
KETONES UR STRIP.AUTO-MCNC: NEGATIVE MG/DL
LEUKOCYTE ESTERASE UR QL STRIP.AUTO: ABNORMAL
LYMPHOCYTES # BLD AUTO: 1.94 K/UL (ref 1–4.8)
LYMPHOCYTES NFR BLD: 36 % (ref 22–41)
MCH RBC QN AUTO: 30.7 PG (ref 27–33)
MCHC RBC AUTO-ENTMCNC: 32.3 G/DL (ref 33.6–35)
MCV RBC AUTO: 95 FL (ref 81.4–97.8)
MICRO URNS: ABNORMAL
MONOCYTES # BLD AUTO: 0.39 K/UL (ref 0–0.85)
MONOCYTES NFR BLD AUTO: 7.2 % (ref 0–13.4)
NEUTROPHILS # BLD AUTO: 2.81 K/UL (ref 2–7.15)
NEUTROPHILS NFR BLD: 52.1 % (ref 44–72)
NITRITE UR QL STRIP.AUTO: NEGATIVE
NRBC # BLD AUTO: 0 K/UL
NRBC BLD-RTO: 0 /100 WBC
PH UR STRIP.AUTO: 6 [PH] (ref 5–8)
PLATELET # BLD AUTO: 325 K/UL (ref 164–446)
PMV BLD AUTO: 9.9 FL (ref 9–12.9)
POTASSIUM SERPL-SCNC: 4 MMOL/L (ref 3.6–5.5)
PROT UR QL STRIP: NEGATIVE MG/DL
PROTHROMBIN TIME: 13.8 SEC (ref 12–14.6)
RBC # BLD AUTO: 4.98 M/UL (ref 4.2–5.4)
RBC # URNS HPF: ABNORMAL /HPF
RBC UR QL AUTO: NEGATIVE
RENAL EPI CELLS #/AREA URNS HPF: ABNORMAL /HPF
SODIUM SERPL-SCNC: 142 MMOL/L (ref 135–145)
SP GR UR STRIP.AUTO: 1.02
UROBILINOGEN UR STRIP.AUTO-MCNC: 0.2 MG/DL
WBC # BLD AUTO: 5.4 K/UL (ref 4.8–10.8)
WBC #/AREA URNS HPF: ABNORMAL /HPF

## 2020-12-07 PROCEDURE — 93010 ELECTROCARDIOGRAM REPORT: CPT | Performed by: INTERNAL MEDICINE

## 2020-12-07 PROCEDURE — 81001 URINALYSIS AUTO W/SCOPE: CPT

## 2020-12-07 PROCEDURE — 85610 PROTHROMBIN TIME: CPT | Mod: GA

## 2020-12-07 PROCEDURE — 36415 COLL VENOUS BLD VENIPUNCTURE: CPT | Mod: GA

## 2020-12-07 PROCEDURE — 93005 ELECTROCARDIOGRAM TRACING: CPT | Performed by: ANESTHESIOLOGY

## 2020-12-07 PROCEDURE — 87086 URINE CULTURE/COLONY COUNT: CPT

## 2020-12-07 PROCEDURE — 71046 X-RAY EXAM CHEST 2 VIEWS: CPT

## 2020-12-07 PROCEDURE — 80048 BASIC METABOLIC PNL TOTAL CA: CPT

## 2020-12-07 PROCEDURE — 87186 SC STD MICRODIL/AGAR DIL: CPT

## 2020-12-07 PROCEDURE — 85025 COMPLETE CBC W/AUTO DIFF WBC: CPT

## 2020-12-07 PROCEDURE — 87077 CULTURE AEROBIC IDENTIFY: CPT

## 2020-12-07 PROCEDURE — 85730 THROMBOPLASTIN TIME PARTIAL: CPT | Mod: GA

## 2021-01-06 ENCOUNTER — TELEPHONE (OUTPATIENT)
Dept: MEDICAL GROUP | Age: 76
End: 2021-01-06

## 2021-01-06 NOTE — TELEPHONE ENCOUNTER
1. Caller Name: Berenice Mart                          Call Back Number: [phone] (home)         How would the patient prefer to be contacted with a response: Phone call OK to leave a detailed message    2. SPECIFIC Action To Be Taken: Needing labs ordered    3. Diagnosis/Clinical Reason for Request: 6 mo F/V     4. Specialty & Provider Name/Lab/Imaging Location: [facility]    5. Is appointment scheduled for requested order/referral: yes - Pt has an appointment to see Dr. Díaz on 3/8/21 and would like to have labs done prior to that appointment     Patient was informed they will receive a return phone call from the office ONLY if there are any questions before processing their request. Advised to call back if they haven't received a call from the referral department in 5 days.

## 2021-01-11 DIAGNOSIS — Z23 NEED FOR VACCINATION: ICD-10-CM

## 2021-01-21 ENCOUNTER — IMMUNIZATION (OUTPATIENT)
Dept: FAMILY PLANNING/WOMEN'S HEALTH CLINIC | Facility: IMMUNIZATION CENTER | Age: 76
End: 2021-01-21
Attending: INTERNAL MEDICINE
Payer: MEDICARE

## 2021-01-21 DIAGNOSIS — Z23 ENCOUNTER FOR VACCINATION: Primary | ICD-10-CM

## 2021-01-21 DIAGNOSIS — Z23 NEED FOR VACCINATION: ICD-10-CM

## 2021-01-21 PROCEDURE — 91300 PFIZER SARS-COV-2 VACCINE: CPT

## 2021-01-21 PROCEDURE — 0001A PFIZER SARS-COV-2 VACCINE: CPT

## 2021-02-11 ENCOUNTER — IMMUNIZATION (OUTPATIENT)
Dept: FAMILY PLANNING/WOMEN'S HEALTH CLINIC | Facility: IMMUNIZATION CENTER | Age: 76
End: 2021-02-11
Attending: INTERNAL MEDICINE
Payer: MEDICARE

## 2021-02-11 DIAGNOSIS — Z23 ENCOUNTER FOR VACCINATION: Primary | ICD-10-CM

## 2021-02-11 PROCEDURE — 0002A PFIZER SARS-COV-2 VACCINE: CPT

## 2021-02-11 PROCEDURE — 91300 PFIZER SARS-COV-2 VACCINE: CPT

## 2021-03-02 ENCOUNTER — TELEPHONE (OUTPATIENT)
Dept: MEDICAL GROUP | Age: 76
End: 2021-03-02

## 2021-03-02 DIAGNOSIS — E55.9 VITAMIN D DEFICIENCY: ICD-10-CM

## 2021-03-02 DIAGNOSIS — R73.01 IFG (IMPAIRED FASTING GLUCOSE): ICD-10-CM

## 2021-03-02 DIAGNOSIS — E53.8 VITAMIN B 12 DEFICIENCY: ICD-10-CM

## 2021-03-02 DIAGNOSIS — E78.2 MIXED HYPERLIPIDEMIA: ICD-10-CM

## 2021-03-02 NOTE — TELEPHONE ENCOUNTER
1. Caller Name: Berenice Mart                        Call Back Number: [phone] (home)       How would the patient prefer to be contacted with a response: Phone call OK to leave a detailed message    2. SPECIFIC Action To Be Taken: Lab orders Needed    3. Diagnosis/Clinical Reason for Request: 6 Month FV    4. Specialty & Provider Name/Lab/Imaging Location: [facility]    5. Is appointment scheduled for requested order/referral: yes - Today    Patient was informed they will receive a return phone call from the office ONLY if there are any questions before processing their request. Advised to call back if they haven't received a call from the referral department in 5 days.

## 2021-03-03 ENCOUNTER — HOSPITAL ENCOUNTER (OUTPATIENT)
Dept: LAB | Facility: MEDICAL CENTER | Age: 76
End: 2021-03-03
Attending: INTERNAL MEDICINE
Payer: MEDICARE

## 2021-03-03 DIAGNOSIS — E78.2 MIXED HYPERLIPIDEMIA: ICD-10-CM

## 2021-03-03 DIAGNOSIS — E53.8 VITAMIN B 12 DEFICIENCY: ICD-10-CM

## 2021-03-03 DIAGNOSIS — R73.01 IFG (IMPAIRED FASTING GLUCOSE): ICD-10-CM

## 2021-03-03 DIAGNOSIS — E55.9 VITAMIN D DEFICIENCY: ICD-10-CM

## 2021-03-03 LAB
25(OH)D3 SERPL-MCNC: 84 NG/ML (ref 30–100)
ALBUMIN SERPL BCP-MCNC: 4.5 G/DL (ref 3.2–4.9)
ALBUMIN/GLOB SERPL: 1.7 G/DL
ALP SERPL-CCNC: 68 U/L (ref 30–99)
ALT SERPL-CCNC: 9 U/L (ref 2–50)
ANION GAP SERPL CALC-SCNC: 12 MMOL/L (ref 7–16)
AST SERPL-CCNC: 17 U/L (ref 12–45)
BASOPHILS # BLD AUTO: 0.6 % (ref 0–1.8)
BASOPHILS # BLD: 0.03 K/UL (ref 0–0.12)
BILIRUB SERPL-MCNC: 0.4 MG/DL (ref 0.1–1.5)
BUN SERPL-MCNC: 14 MG/DL (ref 8–22)
CALCIUM SERPL-MCNC: 10.3 MG/DL (ref 8.5–10.5)
CHLORIDE SERPL-SCNC: 105 MMOL/L (ref 96–112)
CHOLEST SERPL-MCNC: 166 MG/DL (ref 100–199)
CO2 SERPL-SCNC: 25 MMOL/L (ref 20–33)
CREAT SERPL-MCNC: 0.75 MG/DL (ref 0.5–1.4)
EOSINOPHIL # BLD AUTO: 0.18 K/UL (ref 0–0.51)
EOSINOPHIL NFR BLD: 3.4 % (ref 0–6.9)
ERYTHROCYTE [DISTWIDTH] IN BLOOD BY AUTOMATED COUNT: 42.3 FL (ref 35.9–50)
EST. AVERAGE GLUCOSE BLD GHB EST-MCNC: 123 MG/DL
FASTING STATUS PATIENT QL REPORTED: NORMAL
GLOBULIN SER CALC-MCNC: 2.7 G/DL (ref 1.9–3.5)
GLUCOSE SERPL-MCNC: 93 MG/DL (ref 65–99)
HBA1C MFR BLD: 5.9 % (ref 4–5.6)
HCT VFR BLD AUTO: 47.5 % (ref 37–47)
HDLC SERPL-MCNC: 57 MG/DL
HGB BLD-MCNC: 14.9 G/DL (ref 12–16)
IMM GRANULOCYTES # BLD AUTO: 0.02 K/UL (ref 0–0.11)
IMM GRANULOCYTES NFR BLD AUTO: 0.4 % (ref 0–0.9)
LDLC SERPL CALC-MCNC: 87 MG/DL
LYMPHOCYTES # BLD AUTO: 2 K/UL (ref 1–4.8)
LYMPHOCYTES NFR BLD: 37.7 % (ref 22–41)
MCH RBC QN AUTO: 30 PG (ref 27–33)
MCHC RBC AUTO-ENTMCNC: 31.4 G/DL (ref 33.6–35)
MCV RBC AUTO: 95.6 FL (ref 81.4–97.8)
MONOCYTES # BLD AUTO: 0.37 K/UL (ref 0–0.85)
MONOCYTES NFR BLD AUTO: 7 % (ref 0–13.4)
NEUTROPHILS # BLD AUTO: 2.7 K/UL (ref 2–7.15)
NEUTROPHILS NFR BLD: 50.9 % (ref 44–72)
NRBC # BLD AUTO: 0 K/UL
NRBC BLD-RTO: 0 /100 WBC
PLATELET # BLD AUTO: 368 K/UL (ref 164–446)
PMV BLD AUTO: 10.5 FL (ref 9–12.9)
POTASSIUM SERPL-SCNC: 4.1 MMOL/L (ref 3.6–5.5)
PROT SERPL-MCNC: 7.2 G/DL (ref 6–8.2)
RBC # BLD AUTO: 4.97 M/UL (ref 4.2–5.4)
SODIUM SERPL-SCNC: 142 MMOL/L (ref 135–145)
TRIGL SERPL-MCNC: 108 MG/DL (ref 0–149)
TSH SERPL DL<=0.005 MIU/L-ACNC: 1.79 UIU/ML (ref 0.38–5.33)
VIT B12 SERPL-MCNC: 518 PG/ML (ref 211–911)
WBC # BLD AUTO: 5.3 K/UL (ref 4.8–10.8)

## 2021-03-03 PROCEDURE — 85025 COMPLETE CBC W/AUTO DIFF WBC: CPT

## 2021-03-03 PROCEDURE — 36415 COLL VENOUS BLD VENIPUNCTURE: CPT

## 2021-03-03 PROCEDURE — 82607 VITAMIN B-12: CPT

## 2021-03-03 PROCEDURE — 84443 ASSAY THYROID STIM HORMONE: CPT

## 2021-03-03 PROCEDURE — 80053 COMPREHEN METABOLIC PANEL: CPT

## 2021-03-03 PROCEDURE — 82306 VITAMIN D 25 HYDROXY: CPT

## 2021-03-03 PROCEDURE — 83036 HEMOGLOBIN GLYCOSYLATED A1C: CPT | Mod: GA

## 2021-03-03 PROCEDURE — 80061 LIPID PANEL: CPT

## 2021-03-03 NOTE — TELEPHONE ENCOUNTER
Phone Number Called: [phone] (home)     Call outcome: Left detailed message for patient. Informed to call back with any additional questions.

## 2021-03-08 ENCOUNTER — OFFICE VISIT (OUTPATIENT)
Dept: MEDICAL GROUP | Age: 76
End: 2021-03-08
Payer: MEDICARE

## 2021-03-08 VITALS
WEIGHT: 147.6 LBS | OXYGEN SATURATION: 95 % | SYSTOLIC BLOOD PRESSURE: 120 MMHG | TEMPERATURE: 98.1 F | HEART RATE: 89 BPM | HEIGHT: 67 IN | BODY MASS INDEX: 23.17 KG/M2 | DIASTOLIC BLOOD PRESSURE: 74 MMHG

## 2021-03-08 DIAGNOSIS — R09.82 POST-NASAL DRIP: ICD-10-CM

## 2021-03-08 DIAGNOSIS — F51.01 PRIMARY INSOMNIA: ICD-10-CM

## 2021-03-08 DIAGNOSIS — E55.9 VITAMIN D DEFICIENCY: ICD-10-CM

## 2021-03-08 DIAGNOSIS — R10.13 DYSPEPSIA: ICD-10-CM

## 2021-03-08 DIAGNOSIS — R10.9 ABDOMINAL CRAMPS: ICD-10-CM

## 2021-03-08 DIAGNOSIS — J44.9 CHRONIC OBSTRUCTIVE PULMONARY DISEASE, UNSPECIFIED COPD TYPE (HCC): ICD-10-CM

## 2021-03-08 DIAGNOSIS — Z12.31 ENCOUNTER FOR SCREENING MAMMOGRAM FOR BREAST CANCER: ICD-10-CM

## 2021-03-08 DIAGNOSIS — E78.2 MIXED HYPERLIPIDEMIA: ICD-10-CM

## 2021-03-08 DIAGNOSIS — R73.01 IFG (IMPAIRED FASTING GLUCOSE): ICD-10-CM

## 2021-03-08 PROCEDURE — 99214 OFFICE O/P EST MOD 30 MIN: CPT | Performed by: INTERNAL MEDICINE

## 2021-03-08 RX ORDER — FENOFIBRATE 134 MG/1
CAPSULE ORAL
Qty: 90 CAPSULE | Refills: 4 | Status: SHIPPED | OUTPATIENT
Start: 2021-03-08 | End: 2022-04-06

## 2021-03-08 RX ORDER — SUCRALFATE 1 G/1
1 TABLET ORAL
Qty: 90 TABLET | Refills: 5 | Status: SHIPPED | OUTPATIENT
Start: 2021-03-08 | End: 2023-01-04

## 2021-03-08 RX ORDER — ALBUTEROL SULFATE 90 UG/1
2 AEROSOL, METERED RESPIRATORY (INHALATION) EVERY 6 HOURS PRN
Qty: 8.5 G | Refills: 11 | Status: SHIPPED | OUTPATIENT
Start: 2021-03-08

## 2021-03-08 RX ORDER — LOVASTATIN 10 MG/1
TABLET ORAL
Qty: 90 TABLET | Refills: 4 | Status: SHIPPED | OUTPATIENT
Start: 2021-03-08 | End: 2022-04-06

## 2021-03-08 RX ORDER — ZOLPIDEM TARTRATE 10 MG/1
10 TABLET ORAL NIGHTLY PRN
Qty: 30 EACH | Refills: 5 | Status: SHIPPED | OUTPATIENT
Start: 2021-03-08 | End: 2022-03-08 | Stop reason: SDUPTHER

## 2021-03-08 RX ORDER — DICYCLOMINE HYDROCHLORIDE 10 MG/1
10 CAPSULE ORAL
Qty: 180 CAPSULE | Refills: 3 | Status: SHIPPED | OUTPATIENT
Start: 2021-03-08 | End: 2022-05-10

## 2021-03-08 ASSESSMENT — ENCOUNTER SYMPTOMS
NEUROLOGICAL NEGATIVE: 1
GASTROINTESTINAL NEGATIVE: 1
CARDIOVASCULAR NEGATIVE: 1
EYES NEGATIVE: 1
RESPIRATORY NEGATIVE: 1
PSYCHIATRIC NEGATIVE: 1
CONSTITUTIONAL NEGATIVE: 1
MUSCULOSKELETAL NEGATIVE: 1

## 2021-03-08 ASSESSMENT — PATIENT HEALTH QUESTIONNAIRE - PHQ9: CLINICAL INTERPRETATION OF PHQ2 SCORE: 0

## 2021-03-08 ASSESSMENT — FIBROSIS 4 INDEX: FIB4 SCORE: 1.19

## 2021-03-08 NOTE — PROGRESS NOTES
Subjective:      Berenice Mart is a 75 y.o. female who presents with Lab Results    The patient is here for followup of chronic medical problems listed below. The patient is compliant with medications and having no side effects from them. Denies chest pain, abdominal pain, dyspnea, myalgias, or cough.   Patient Active Problem List    Diagnosis Date Noted   • Primary insomnia 01/04/2018   • Chronic pain syndrome- d/t ddd l/s and cervical spine- pain mgt- rx hc/apap qd and gabapentin 01/04/2018   • Opiate dependence, continuous (HCC)- pain mgt 01/04/2018   • Bladder disorder- OAB -dr. willoughby (gyn) 05/20/2015   • Chronic allergic rhinitis 04/03/2014   • Vitamin D deficiency disease 12/17/2013   • COPD (chronic obstructive pulmonary disease) ([facility]) 12/17/2013   • Mixed hyperlipidemia 12/01/2011   • DDD (degenerative disc disease), lumbar 12/01/2011   • DDD (degenerative disc disease), cervical 12/01/2011   • Menopausal and postmenopausal disorder 12/01/2011     .akk  Outpatient Medications Prior to Visit   Medication Sig Dispense Refill   • estradiol (CLIMARA) 0.0375 MG/24HR patch estradiol 0.0375 mg/24 hr weekly transdermal patch     • gabapentin (NEURONTIN) 100 MG Cap      • ibuprofen (MOTRIN) 600 MG Tab ibuprofen 600 mg tablet     • sulfamethoxazole-trimethoprim (BACTRIM DS) 800-160 MG tablet sulfamethoxazole 800 mg-trimethoprim 160 mg tablet   TK 1 T PO  BID     • HYDROcodone/acetaminophen (NORCO)  MG Tab hydrocodone 10 mg-acetaminophen 325 mg tablet     • methocarbamol (ROBAXIN) 500 MG Tab methocarbamol 500 mg tablet     • fluticasone (FLONASE) 50 MCG/ACT nasal spray Spray 2 Sprays in nose every day. 16 g 12   • Mirabegron ER (MYRBETRIQ) 25 MG TABLET SR 24 HR Take 1 Each by mouth 2 Times a Day. 180 Tab 4   • HYDROcodone Bitartrate ER (HYSINGLA ER) 30 MG Tablet Extended Release 24 hour Abuse-Deterrent Hysingla ER 30 mg tablet, crush resistant, extended release     • erythromycin 5 MG/GM Ointment  erythromycin 5 mg/gram (0.5 %) eye ointment     • fluticasone-salmeterol (ADVAIR DISKUS) 250-50 MCG/DOSE AEROSOL POWDER, BREATH ACTIVATED Advair Diskus 250 mcg-50 mcg/dose powder for inhalation     • fosfomycin (MONUROL) 3 GM Pack Monurol 3 gram oral packet     • gabapentin (NEURONTIN) 100 MG Cap gabapentin 100 mg capsule     • HYDROcodone Bitartrate ER (HYSINGLA ER) 20 MG Tablet Extended Release 24 hour Abuse-Deterrent Hysingla ER 20 mg tablet, crush resistant, extended release     • Influenza Vaccine High-Dose pf 0.5 ML Suspension Prefilled Syringe injection Fluzone High-Dose 2015-16 (PF) 180 mcg/0.5 mL intramuscular syringe     • levoFLOXacin (LEVAQUIN) 500 MG tablet levofloxacin 500 mg tablet     • lidocaine (LIDODERM) 5 % Patch lidocaine 5 % topical patch   ZEKE 1 PATCH UTD ON THE SKIN Q 12 H     • ofloxacin (OCUFLOX) 0.3 % Solution ofloxacin 0.3 % eye drops     • oxyCODONE-acetaminophen (PERCOCET-10)  MG Tab oxycodone-acetaminophen 10 mg-325 mg tablet     • prednisoLONE acetate (PRED FORTE) 1 % Suspension prednisolone acetate 1 % eye drops,suspension     • rivaroxaban (XARELTO) 10 MG Tab tablet Xarelto 10 mg tablet     • tizanidine (ZANAFLEX) 4 MG Tab tizanidine 4 mg tablet     • tolterodine ER (DETROL LA) 4 MG CAPSULE SR 24 HR tolterodine ER 4 mg capsule,extended release 24 hr     • triamcinolone acetonide (KENALOG) 0.1 % Cream triamcinolone acetonide 0.1 % topical cream     • zolpidem (AMBIEN) 10 MG Tab zolpidem 10 mg tablet     • zolpidem (AMBIEN) 5 MG Tab zolpidem 5 mg tablet     • Zoster Vaccine Live (ZOSTAVAX) 05603 UNT/0.65ML Recon Susp Zostavax (PF) 19,400 unit/0.65 mL subcutaneous suspension   ADM 0.65ML SC UTD     • albuterol (PROAIR HFA) 108 (90 Base) MCG/ACT Aero Soln inhalation aerosol ProAir HFA 90 mcg/actuation aerosol inhaler     • dicyclomine (BENTYL) 10 MG Cap dicyclomine 10 mg capsule     • fluticasone (FLONASE) 50 MCG/ACT nasal spray fluticasone 50 mcg/actuation nasal  spray,suspension   SHAKE LQ AND U 2 SPRAYS IEN QD     • fluticasone (FLONASE) 50 MCG/ACT nasal spray fluticasone propionate 50 mcg/actuation nasal spray,suspension   SHAKE LQ AND U 2 SPRAYS IEN QD     • HYDROcodone/acetaminophen (NORCO)  MG Tab hydrocodone 10 mg-acetaminophen 325 mg tablet     • methocarbamol (ROBAXIN) 500 MG Tab methocarbamol 500 mg tablet     • lovastatin (MEVACOR) 10 MG tablet lovastatin 10 mg tablet     • methocarbamol (ROBAXIN) 750 MG Tab methocarbamol 750 mg tablet     • methocarbamol (ROBAXIN) 750 MG Tab      • sucralfate (CARAFATE) 1 GM Tab sucralfate 1 gram tablet     • Mirabegron ER (MYRBETRIQ) 25 MG TABLET SR 24 HR Myrbetriq 25 mg tablet,extended release     • MYRBETRIQ 50 MG TABLET SR 24 HR      • sulfamethoxazole-trimethoprim (BACTRIM) 400-80 MG Tab sulfamethoxazole 400 mg-trimethoprim 80 mg tablet     • sucralfate (CARAFATE) 1 GM Tab Take 1 Tab by mouth 4 Times a Day,Before Meals and at Bedtime. (Patient not taking: Reported on 3/8/2021) 120 Tab 3   • lovastatin (MEVACOR) 10 MG tablet TAKE ONE TABLET BY MOUTH ONE TIME DAILY  90 Tab 4   • fenofibrate micronized (LOFIBRA) 134 MG capsule TAKE ONE CAPSULE BY MOUTH IN THE MORNING BEFORE BREAKFAST. 90 Cap 4   • dicyclomine (BENTYL) 10 MG Cap dicyclomine     • beclomethasone (QVAR) 40 MCG/ACT inhaler Inhale 1 Puff by mouth 2 Times a Day.     • Polyethylene Glycol 1000 Powder by Does not apply route.     • sucralfate (CARAFATE) 1 GM Tab sucralfate 1 gram tablet   Take 1 tablet 3 times a day by oral route for 30 days.     • sulfamethoxazole-trimethoprim (BACTRIM) 400-80 MG Tab sulfamethoxazole 400 mg-trimethoprim 80 mg tablet     • albuterol (PROAIR HFA) 108 (90 Base) MCG/ACT Aero Soln inhalation aerosol Inhale 2 Puffs by mouth every 6 hours as needed for Shortness of Breath. 8.5 g 11     No facility-administered medications prior to visit.     Hospital Outpatient Visit on 03/03/2021   Component Date Value   • 25-Hydroxy   Vitamin D 25  03/03/2021 84    • TSH 03/03/2021 1.790    • Sodium 03/03/2021 142    • Potassium 03/03/2021 4.1    • Chloride 03/03/2021 105    • Co2 03/03/2021 25    • Anion Gap 03/03/2021 12.0    • Glucose 03/03/2021 93    • Bun 03/03/2021 14    • Creatinine 03/03/2021 0.75    • Calcium 03/03/2021 10.3    • AST(SGOT) 03/03/2021 17    • ALT(SGPT) 03/03/2021 9    • Alkaline Phosphatase 03/03/2021 68    • Total Bilirubin 03/03/2021 0.4    • Albumin 03/03/2021 4.5    • Total Protein 03/03/2021 7.2    • Globulin 03/03/2021 2.7    • A-G Ratio 03/03/2021 1.7    • Cholesterol,Tot 03/03/2021 166    • Triglycerides 03/03/2021 108    • HDL 03/03/2021 57    • LDL 03/03/2021 87    • WBC 03/03/2021 5.3    • RBC 03/03/2021 4.97    • Hemoglobin 03/03/2021 14.9    • Hematocrit 03/03/2021 47.5*   • MCV 03/03/2021 95.6    • MCH 03/03/2021 30.0    • MCHC 03/03/2021 31.4*   • RDW 03/03/2021 42.3    • Platelet Count 03/03/2021 368    • MPV 03/03/2021 10.5    • Neutrophils-Polys 03/03/2021 50.90    • Lymphocytes 03/03/2021 37.70    • Monocytes 03/03/2021 7.00    • Eosinophils 03/03/2021 3.40    • Basophils 03/03/2021 0.60    • Immature Granulocytes 03/03/2021 0.40    • Nucleated RBC 03/03/2021 0.00    • Neutrophils (Absolute) 03/03/2021 2.70    • Lymphs (Absolute) 03/03/2021 2.00    • Monos (Absolute) 03/03/2021 0.37    • Eos (Absolute) 03/03/2021 0.18    • Baso (Absolute) 03/03/2021 0.03    • Immature Granulocytes (a* 03/03/2021 0.02    • NRBC (Absolute) 03/03/2021 0.00    • Glycohemoglobin 03/03/2021 5.9*   • Est Avg Glucose 03/03/2021 123    • Vitamin B12 -True Cobala* 03/03/2021 518    • Fasting Status 03/03/2021 Fasting    • GFR If  03/03/2021 >60    • GFR If Non  [facility]* 03/03/2021 >60       Lab Results   Component Value Date/Time    HBA1C 5.9 (H) 03/03/2021 09:19 AM    HBA1C 6.0 (H) 05/04/2016 07:58 AM     Lab Results   Component Value Date/Time    SODIUM 142 03/03/2021 09:19 AM    POTASSIUM 4.1 03/03/2021 09:19 AM     "CHLORIDE 105 03/03/2021 09:19 AM    CO2 25 03/03/2021 09:19 AM    GLUCOSE 93 03/03/2021 09:19 AM    BUN 14 03/03/2021 09:19 AM    CREATININE 0.75 03/03/2021 09:19 AM    CREATININE 0.7 08/17/2006 08:15 AM    ALKPHOSPHAT 68 03/03/2021 09:19 AM    ASTSGOT 17 03/03/2021 09:19 AM    ALTSGPT 9 03/03/2021 09:19 AM    TBILIRUBIN 0.4 03/03/2021 09:19 AM     Lab Results   Component Value Date/Time    INR 1.03 12/07/2020 09:10 AM     Lab Results   Component Value Date/Time    CHOLSTRLTOT 166 03/03/2021 09:19 AM    LDL 87 03/03/2021 09:19 AM    HDL 57 03/03/2021 09:19 AM    TRIGLYCERIDE 108 03/03/2021 09:19 AM       No results found for: TESTOSTERONE  No results found for: TSH  Lab Results   Component Value Date/Time    FREET4 0.80 04/26/2018 12:25 PM    FREET4 0.95 03/24/2014 07:48 AM     Lab Results   Component Value Date/Time    URICACID 3.4 05/14/2012 07:53 AM     No components found for: VITB12  Lab Results   Component Value Date/Time    25HYDROXY 84 03/03/2021 09:19 AM    25HYDROXY 68 07/03/2020 07:40 AM             HPI    Review of Systems   Constitutional: Negative.    HENT: Negative.    Eyes: Negative.    Respiratory: Negative.    Cardiovascular: Negative.    Gastrointestinal: Negative.    Genitourinary: Negative.    Musculoskeletal: Negative.    Skin: Negative.    Neurological: Negative.    Endo/Heme/Allergies: Negative.    Psychiatric/Behavioral: Negative.           Objective:     /74 (BP Location: Left arm, Patient Position: Sitting, BP Cuff Size: Adult)   Pulse 89   Temp 36.7 °C (98.1 °F) (Temporal)   Ht 1.702 m (5' 7\")   Wt 67 kg (147 lb 9.6 oz)   LMP 12/01/1976   SpO2 95%   BMI 23.12 kg/m²      Physical Exam  Vitals reviewed.   Constitutional:       General: She is not in acute distress.     Appearance: She is well-developed. She is not diaphoretic.   HENT:      Head: Normocephalic and atraumatic.      Right Ear: External ear normal.      Left Ear: External ear normal.      Nose: Nose normal.      " Mouth/Throat:      Pharynx: No oropharyngeal exudate.   Eyes:      General: No scleral icterus.        Right eye: No discharge.         Left eye: No discharge.      Conjunctiva/sclera: Conjunctivae normal.      Pupils: Pupils are equal, round, and reactive to light.   Neck:      Thyroid: No thyromegaly.      Vascular: No JVD.      Trachea: No tracheal deviation.   Cardiovascular:      Rate and Rhythm: Normal rate and regular rhythm.      Heart sounds: Normal heart sounds. No murmur. No friction rub. No gallop.    Pulmonary:      Effort: Pulmonary effort is normal. No respiratory distress.      Breath sounds: Normal breath sounds. No stridor. No wheezing or rales.   Chest:      Chest wall: No tenderness.   Abdominal:      General: Bowel sounds are normal. There is no distension.      Palpations: Abdomen is soft. There is no mass.      Tenderness: There is no abdominal tenderness. There is no guarding or rebound.   Musculoskeletal:         General: No tenderness. Normal range of motion.      Cervical back: Normal range of motion and neck supple.   Lymphadenopathy:      Cervical: No cervical adenopathy.   Skin:     General: Skin is warm and dry.      Coloration: Skin is not pale.      Findings: No erythema or rash.   Neurological:      Mental Status: She is alert and oriented to person, place, and time.      Cranial Nerves: No cranial nerve deficit.      Motor: No abnormal muscle tone.      Coordination: Coordination normal.      Deep Tendon Reflexes: Reflexes are normal and symmetric. Reflexes normal.   Psychiatric:         Behavior: Behavior normal.         Thought Content: Thought content normal.         Judgment: Judgment normal.                 Assessment/Plan:        1. Post-nasal drip  Doing well.  Continue same regimen.  Notified and steroid nasal spray    2. Primary insomnia  Good control.  Continue Ambien  - zolpidem (AMBIEN) 10 MG Tab; Take 1 tablet by mouth at bedtime as needed for Sleep for up to 30 days.   Dispense: 30 Each; Refill: 5    3. Abdominal cramps  Good control on dicyclomine  - dicyclomine (BENTYL) 10 MG Cap; Take 1 capsule by mouth 2 times daily, before breakfast and dinner.  Dispense: 180 capsule; Refill: 3    4. Dyspepsia      - sucralfate (CARAFATE) 1 GM Tab; Take 1 tablet by mouth 3 times a day before meals.  Dispense: 90 tablet; Refill: 5    5. Mixed hyperlipidemia  Good control continue same regimen  - lovastatin (MEVACOR) 10 MG tablet; TAKE ONE TABLET BY MOUTH ONE TIME DAILY  Dispense: 90 tablet; Refill: 4  - fenofibrate micronized (LOFIBRA) 134 MG capsule; TAKE ONE CAPSULE BY MOUTH IN THE MORNING BEFORE BREAKFAST.  Dispense: 90 capsule; Refill: 4  - TSH; Future  - Comp Metabolic Panel; Future  - Lipid Profile; Future  - CBC WITH DIFFERENTIAL; Future    6. Chronic obstructive pulmonary disease, unspecified COPD type (HCC)  Good control continue same regimen  - albuterol (PROAIR HFA) 108 (90 Base) MCG/ACT Aero Soln inhalation aerosol; Inhale 2 Puffs every 6 hours as needed for Shortness of Breath.  Dispense: 8.5 g; Refill: 11    7. Encounter for screening mammogram for breast cancer     - MA-SCREENING MAMMO BILAT W/CAD; Future    8. IFG (impaired fasting glucose)     - HEMOGLOBIN A1C; Future    9. Vitamin D deficiency     - VITAMIN D,25 HYDROXY; Future

## 2021-06-03 ENCOUNTER — HOSPITAL ENCOUNTER (OUTPATIENT)
Dept: RADIOLOGY | Facility: MEDICAL CENTER | Age: 76
End: 2021-06-03
Attending: INTERNAL MEDICINE
Payer: MEDICARE

## 2021-06-03 DIAGNOSIS — Z12.31 ENCOUNTER FOR SCREENING MAMMOGRAM FOR BREAST CANCER: ICD-10-CM

## 2021-06-03 PROCEDURE — 77063 BREAST TOMOSYNTHESIS BI: CPT

## 2021-08-31 ENCOUNTER — APPOINTMENT (RX ONLY)
Dept: URBAN - METROPOLITAN AREA CLINIC 4 | Facility: CLINIC | Age: 76
Setting detail: DERMATOLOGY
End: 2021-08-31

## 2021-08-31 DIAGNOSIS — L72.8 OTHER FOLLICULAR CYSTS OF THE SKIN AND SUBCUTANEOUS TISSUE: ICD-10-CM

## 2021-08-31 DIAGNOSIS — Z85.828 PERSONAL HISTORY OF OTHER MALIGNANT NEOPLASM OF SKIN: ICD-10-CM

## 2021-08-31 DIAGNOSIS — L81.4 OTHER MELANIN HYPERPIGMENTATION: ICD-10-CM

## 2021-08-31 DIAGNOSIS — L82.1 OTHER SEBORRHEIC KERATOSIS: ICD-10-CM

## 2021-08-31 DIAGNOSIS — D18.0 HEMANGIOMA: ICD-10-CM

## 2021-08-31 DIAGNOSIS — L82.0 INFLAMED SEBORRHEIC KERATOSIS: ICD-10-CM

## 2021-08-31 PROBLEM — D18.01 HEMANGIOMA OF SKIN AND SUBCUTANEOUS TISSUE: Status: ACTIVE | Noted: 2021-08-31

## 2021-08-31 PROCEDURE — 99203 OFFICE O/P NEW LOW 30 MIN: CPT

## 2021-08-31 PROCEDURE — ? RECOMMENDATIONS

## 2021-08-31 PROCEDURE — ? OBSERVATION

## 2021-08-31 PROCEDURE — ? COUNSELING

## 2021-08-31 PROCEDURE — ? LIQUID NITROGEN (COSMETIC)

## 2021-08-31 ASSESSMENT — LOCATION DETAILED DESCRIPTION DERM
LOCATION DETAILED: RIGHT INFERIOR CRUS OF ANTIHELIX
LOCATION DETAILED: RIGHT RADIAL DORSAL HAND
LOCATION DETAILED: LEFT PROXIMAL DORSAL FOREARM
LOCATION DETAILED: RIGHT BUTTOCK
LOCATION DETAILED: LEFT VENTRAL PROXIMAL FOREARM
LOCATION DETAILED: RIGHT INFERIOR MEDIAL UPPER BACK
LOCATION DETAILED: LEFT CRUS OF HELIX
LOCATION DETAILED: RIGHT MEDIAL UPPER BACK
LOCATION DETAILED: RIGHT MID-UPPER BACK
LOCATION DETAILED: RIGHT ANTITRAGUS

## 2021-08-31 ASSESSMENT — LOCATION SIMPLE DESCRIPTION DERM
LOCATION SIMPLE: RIGHT UPPER BACK
LOCATION SIMPLE: LEFT FOREARM
LOCATION SIMPLE: RIGHT HAND
LOCATION SIMPLE: LEFT EAR
LOCATION SIMPLE: RIGHT EAR
LOCATION SIMPLE: RIGHT BUTTOCK

## 2021-08-31 ASSESSMENT — LOCATION ZONE DERM
LOCATION ZONE: TRUNK
LOCATION ZONE: EAR
LOCATION ZONE: ARM
LOCATION ZONE: HAND

## 2021-08-31 NOTE — PROCEDURE: REASSURANCE
Detail Level: Generalized
Hide Additional Notes?: No
Include Location In Plan?: Yes
Detail Level: Zone
Detail Level: Detailed

## 2021-08-31 NOTE — PROCEDURE: RECOMMENDATIONS
Render Risk Assessment In Note?: no
Detail Level: Zone
Recommendation Preamble: The following recommendations were made during the visit:  OTC Compound W or exfoliating moisturizers

## 2021-09-01 ENCOUNTER — HOSPITAL ENCOUNTER (OUTPATIENT)
Dept: LAB | Facility: MEDICAL CENTER | Age: 76
End: 2021-09-01
Attending: INTERNAL MEDICINE
Payer: MEDICARE

## 2021-09-01 DIAGNOSIS — E55.9 VITAMIN D DEFICIENCY: ICD-10-CM

## 2021-09-01 DIAGNOSIS — E78.2 MIXED HYPERLIPIDEMIA: ICD-10-CM

## 2021-09-01 DIAGNOSIS — R73.01 IFG (IMPAIRED FASTING GLUCOSE): ICD-10-CM

## 2021-09-01 LAB
25(OH)D3 SERPL-MCNC: 80 NG/ML (ref 30–100)
ALBUMIN SERPL BCP-MCNC: 4.5 G/DL (ref 3.2–4.9)
ALBUMIN/GLOB SERPL: 1.7 G/DL
ALP SERPL-CCNC: 61 U/L (ref 30–99)
ALT SERPL-CCNC: 9 U/L (ref 2–50)
ANION GAP SERPL CALC-SCNC: 11 MMOL/L (ref 7–16)
AST SERPL-CCNC: 21 U/L (ref 12–45)
BASOPHILS # BLD AUTO: 0.6 % (ref 0–1.8)
BASOPHILS # BLD: 0.03 K/UL (ref 0–0.12)
BILIRUB SERPL-MCNC: 0.4 MG/DL (ref 0.1–1.5)
BUN SERPL-MCNC: 15 MG/DL (ref 8–22)
CALCIUM SERPL-MCNC: 10 MG/DL (ref 8.5–10.5)
CHLORIDE SERPL-SCNC: 105 MMOL/L (ref 96–112)
CHOLEST SERPL-MCNC: 177 MG/DL (ref 100–199)
CO2 SERPL-SCNC: 24 MMOL/L (ref 20–33)
CREAT SERPL-MCNC: 0.77 MG/DL (ref 0.5–1.4)
EOSINOPHIL # BLD AUTO: 0.18 K/UL (ref 0–0.51)
EOSINOPHIL NFR BLD: 3.4 % (ref 0–6.9)
ERYTHROCYTE [DISTWIDTH] IN BLOOD BY AUTOMATED COUNT: 43.8 FL (ref 35.9–50)
EST. AVERAGE GLUCOSE BLD GHB EST-MCNC: 117 MG/DL
FASTING STATUS PATIENT QL REPORTED: NORMAL
GLOBULIN SER CALC-MCNC: 2.7 G/DL (ref 1.9–3.5)
GLUCOSE SERPL-MCNC: 86 MG/DL (ref 65–99)
HBA1C MFR BLD: 5.7 % (ref 4–5.6)
HCT VFR BLD AUTO: 47.7 % (ref 37–47)
HDLC SERPL-MCNC: 57 MG/DL
HGB BLD-MCNC: 15.3 G/DL (ref 12–16)
IMM GRANULOCYTES # BLD AUTO: 0.02 K/UL (ref 0–0.11)
IMM GRANULOCYTES NFR BLD AUTO: 0.4 % (ref 0–0.9)
LDLC SERPL CALC-MCNC: 95 MG/DL
LYMPHOCYTES # BLD AUTO: 2.28 K/UL (ref 1–4.8)
LYMPHOCYTES NFR BLD: 43.1 % (ref 22–41)
MCH RBC QN AUTO: 30.7 PG (ref 27–33)
MCHC RBC AUTO-ENTMCNC: 32.1 G/DL (ref 33.6–35)
MCV RBC AUTO: 95.6 FL (ref 81.4–97.8)
MONOCYTES # BLD AUTO: 0.35 K/UL (ref 0–0.85)
MONOCYTES NFR BLD AUTO: 6.6 % (ref 0–13.4)
NEUTROPHILS # BLD AUTO: 2.43 K/UL (ref 2–7.15)
NEUTROPHILS NFR BLD: 45.9 % (ref 44–72)
NRBC # BLD AUTO: 0 K/UL
NRBC BLD-RTO: 0 /100 WBC
PLATELET # BLD AUTO: 328 K/UL (ref 164–446)
PMV BLD AUTO: 10.5 FL (ref 9–12.9)
POTASSIUM SERPL-SCNC: 3.9 MMOL/L (ref 3.6–5.5)
PROT SERPL-MCNC: 7.2 G/DL (ref 6–8.2)
RBC # BLD AUTO: 4.99 M/UL (ref 4.2–5.4)
SODIUM SERPL-SCNC: 140 MMOL/L (ref 135–145)
TRIGL SERPL-MCNC: 123 MG/DL (ref 0–149)
TSH SERPL DL<=0.005 MIU/L-ACNC: 3.83 UIU/ML (ref 0.38–5.33)
WBC # BLD AUTO: 5.3 K/UL (ref 4.8–10.8)

## 2021-09-01 PROCEDURE — 36415 COLL VENOUS BLD VENIPUNCTURE: CPT

## 2021-09-01 PROCEDURE — 82306 VITAMIN D 25 HYDROXY: CPT

## 2021-09-01 PROCEDURE — 80053 COMPREHEN METABOLIC PANEL: CPT

## 2021-09-01 PROCEDURE — 85025 COMPLETE CBC W/AUTO DIFF WBC: CPT

## 2021-09-01 PROCEDURE — 84443 ASSAY THYROID STIM HORMONE: CPT

## 2021-09-01 PROCEDURE — 83036 HEMOGLOBIN GLYCOSYLATED A1C: CPT | Mod: GA

## 2021-09-01 PROCEDURE — 80061 LIPID PANEL: CPT

## 2021-09-08 ENCOUNTER — OFFICE VISIT (OUTPATIENT)
Dept: MEDICAL GROUP | Age: 76
End: 2021-09-08
Payer: MEDICARE

## 2021-09-08 VITALS
SYSTOLIC BLOOD PRESSURE: 92 MMHG | BODY MASS INDEX: 23.13 KG/M2 | DIASTOLIC BLOOD PRESSURE: 52 MMHG | OXYGEN SATURATION: 93 % | WEIGHT: 147.4 LBS | HEIGHT: 67 IN | TEMPERATURE: 98.5 F | HEART RATE: 66 BPM

## 2021-09-08 DIAGNOSIS — E78.2 MIXED HYPERLIPIDEMIA: ICD-10-CM

## 2021-09-08 DIAGNOSIS — J45.20 MILD INTERMITTENT ASTHMA WITHOUT COMPLICATION: ICD-10-CM

## 2021-09-08 DIAGNOSIS — E55.9 VITAMIN D DEFICIENCY: ICD-10-CM

## 2021-09-08 DIAGNOSIS — J43.1 PANLOBULAR EMPHYSEMA (HCC): ICD-10-CM

## 2021-09-08 DIAGNOSIS — H61.22 EXCESSIVE EAR WAX, LEFT: ICD-10-CM

## 2021-09-08 PROCEDURE — 99214 OFFICE O/P EST MOD 30 MIN: CPT | Mod: 25 | Performed by: INTERNAL MEDICINE

## 2021-09-08 PROCEDURE — 69210 REMOVE IMPACTED EAR WAX UNI: CPT | Performed by: INTERNAL MEDICINE

## 2021-09-08 RX ORDER — NITROFURANTOIN 25; 75 MG/1; MG/1
CAPSULE ORAL
COMMUNITY
End: 2021-09-08

## 2021-09-08 RX ORDER — OXYCODONE HYDROCHLORIDE 5 MG/1
TABLET ORAL
COMMUNITY
End: 2021-09-08

## 2021-09-08 RX ORDER — AMOXICILLIN 500 MG/1
CAPSULE ORAL
COMMUNITY
End: 2021-09-08

## 2021-09-08 RX ORDER — METHOCARBAMOL 750 MG/1
TABLET, FILM COATED ORAL
COMMUNITY
Start: 2021-08-17 | End: 2023-04-12

## 2021-09-08 RX ORDER — AMOXICILLIN AND CLAVULANATE POTASSIUM 875; 125 MG/1; MG/1
TABLET, FILM COATED ORAL
COMMUNITY
End: 2021-09-08

## 2021-09-08 RX ORDER — AMPICILLIN 500 MG/1
CAPSULE ORAL
COMMUNITY
End: 2021-09-08

## 2021-09-08 RX ORDER — POLYETHYLENE GLYCOL 3350 17 G/17G
POWDER, FOR SOLUTION ORAL
COMMUNITY

## 2021-09-08 ASSESSMENT — ENCOUNTER SYMPTOMS
EYES NEGATIVE: 1
RESPIRATORY NEGATIVE: 1
CONSTITUTIONAL NEGATIVE: 1
MUSCULOSKELETAL NEGATIVE: 1
NEUROLOGICAL NEGATIVE: 1
CARDIOVASCULAR NEGATIVE: 1
GASTROINTESTINAL NEGATIVE: 1
PSYCHIATRIC NEGATIVE: 1

## 2021-09-08 ASSESSMENT — FIBROSIS 4 INDEX: FIB4 SCORE: 1.66

## 2021-09-08 NOTE — PROGRESS NOTES
Subjective     Berenice Mart is a 76 y.o. female who presents with Ear Fullness (pt states that her head feels congested x couple of weeks ) and Lab Results  The patient is here for followup of chronic medical problems listed below. The patient is compliant with medications and having no side effects from them. Denies chest pain, abdominal pain, dyspnea, myalgias, or cough.   Patient Active Problem List    Diagnosis Date Noted   • Excessive ear wax, left 09/08/2021   • Mild intermittent asthma without complication 09/08/2021   • Primary insomnia 01/04/2018   • Chronic pain syndrome- d/t ddd l/s and cervical spine- pain mgt- rx hc/apap qd and gabapentin 01/04/2018   • Opiate dependence, continuous (HCC)- pain mgt 01/04/2018   • Bladder disorder- OAB -dr. chin (gyn) 05/20/2015   • Chronic allergic rhinitis 04/03/2014   • Vitamin D deficiency disease 12/17/2013   • COPD (chronic obstructive pulmonary disease) ([facility]) 12/17/2013   • Mixed hyperlipidemia 12/01/2011   • DDD (degenerative disc disease), lumbar 12/01/2011   • DDD (degenerative disc disease), cervical 12/01/2011   • Menopausal and postmenopausal disorder 12/01/2011     Allergies   Allergen Reactions   • Codeine    • Morphine Hcl Nausea     Dr Chin aware     Outpatient Medications Prior to Visit   Medication Sig Dispense Refill   • methocarbamol (ROBAXIN) 750 MG Tab      • polyethylene glycol 3350 (MIRALAX) 17 GM/SCOOP Powder polyethylene glycol 3350 17 gram oral powder packet     • dicyclomine (BENTYL) 10 MG Cap Take 1 capsule by mouth 2 times daily, before breakfast and dinner. 180 capsule 3   • sucralfate (CARAFATE) 1 GM Tab Take 1 tablet by mouth 3 times a day before meals. 90 tablet 5   • lovastatin (MEVACOR) 10 MG tablet TAKE ONE TABLET BY MOUTH ONE TIME DAILY 90 tablet 4   • fenofibrate micronized (LOFIBRA) 134 MG capsule TAKE ONE CAPSULE BY MOUTH IN THE MORNING BEFORE BREAKFAST. 90 capsule 4   • albuterol (PROAIR HFA) 108 (90 Base)  MCG/ACT Aero Soln inhalation aerosol Inhale 2 Puffs every 6 hours as needed for Shortness of Breath. 8.5 g 11   • estradiol (CLIMARA) 0.0375 MG/24HR patch estradiol 0.0375 mg/24 hr weekly transdermal patch     • gabapentin (NEURONTIN) 100 MG Cap      • ibuprofen (MOTRIN) 600 MG Tab ibuprofen 600 mg tablet     • sulfamethoxazole-trimethoprim (BACTRIM DS) 800-160 MG tablet sulfamethoxazole 800 mg-trimethoprim 160 mg tablet   TK 1 T PO  BID     • HYDROcodone/acetaminophen (NORCO)  MG Tab hydrocodone 10 mg-acetaminophen 325 mg tablet     • fluticasone (FLONASE) 50 MCG/ACT nasal spray Spray 2 Sprays in nose every day. 16 g 12   • Mirabegron ER (MYRBETRIQ) 25 MG TABLET SR 24 HR Take 1 Each by mouth 2 Times a Day. 180 Tab 4   • amoxicillin (AMOXIL) 500 MG Cap amoxicillin 500 mg capsule (Patient not taking: Reported on 9/8/2021)     • amoxicillin-clavulanate (AUGMENTIN) 875-125 MG Tab Augmentin 875 mg-125 mg tablet   Take 1 tablet(s) EVERY 12 HOURS by oral route day before dental work, day of and day after (Patient not taking: Reported on 9/8/2021)     • ampicillin (PRINCIPEN) 500 MG Cap ampicillin 500 mg capsule (Patient not taking: Reported on 9/8/2021)     • nitrofurantoin (MACROBID) 100 MG Cap nitrofurantoin monohydrate/macrocrystals 100 mg capsule (Patient not taking: Reported on 9/8/2021)     • oxyCODONE immediate-release (ROXICODONE) 5 MG Tab oxycodone 5 mg tablet (Patient not taking: Reported on 9/8/2021)     • methocarbamol (ROBAXIN) 500 MG Tab methocarbamol 500 mg tablet (Patient not taking: Reported on 9/8/2021)       No facility-administered medications prior to visit.               HPI    Review of Systems   Constitutional: Negative.    HENT: Positive for hearing loss.         Left ear due to wax   Eyes: Negative.    Respiratory: Negative.    Cardiovascular: Negative.    Gastrointestinal: Negative.    Genitourinary: Negative.    Musculoskeletal: Negative.    Skin: Negative.    Neurological: Negative.   "  Endo/Heme/Allergies: Negative.    Psychiatric/Behavioral: Negative.               Objective     BP (!) 92/52 (BP Location: Left arm, Patient Position: Sitting, BP Cuff Size: Adult)   Pulse 66   Temp 36.9 °C (98.5 °F) (Temporal)   Ht 1.702 m (5' 7\")   Wt 66.9 kg (147 lb 6.4 oz)   LMP 12/01/1976   SpO2 93%   BMI 23.09 kg/m²      Physical Exam  Vitals reviewed.   Constitutional:       General: She is not in acute distress.     Appearance: She is well-developed. She is not diaphoretic.   HENT:      Head: Normocephalic and atraumatic.      Right Ear: External ear normal.      Left Ear: External ear normal. There is impacted cerumen.      Nose: Nose normal.      Mouth/Throat:      Pharynx: No oropharyngeal exudate.   Eyes:      General: No scleral icterus.        Right eye: No discharge.         Left eye: No discharge.      Conjunctiva/sclera: Conjunctivae normal.      Pupils: Pupils are equal, round, and reactive to light.   Neck:      Thyroid: No thyromegaly.      Vascular: No JVD.      Trachea: No tracheal deviation.   Cardiovascular:      Rate and Rhythm: Normal rate and regular rhythm.      Heart sounds: Normal heart sounds. No murmur heard.   No friction rub. No gallop.    Pulmonary:      Effort: Pulmonary effort is normal. No respiratory distress.      Breath sounds: Normal breath sounds. No stridor. No wheezing or rales.   Chest:      Chest wall: No tenderness.   Abdominal:      General: Bowel sounds are normal. There is no distension.      Palpations: Abdomen is soft. There is no mass.      Tenderness: There is no abdominal tenderness. There is no guarding or rebound.   Musculoskeletal:         General: No tenderness. Normal range of motion.      Cervical back: Normal range of motion and neck supple.   Lymphadenopathy:      Cervical: No cervical adenopathy.   Skin:     General: Skin is warm and dry.      Coloration: Skin is not pale.      Findings: No erythema or rash.   Neurological:      Mental Status: " She is alert and oriented to person, place, and time.      Cranial Nerves: No cranial nerve deficit.      Motor: No abnormal muscle tone.      Coordination: Coordination normal.      Deep Tendon Reflexes: Reflexes are normal and symmetric. Reflexes normal.   Psychiatric:         Behavior: Behavior normal.         Thought Content: Thought content normal.         Judgment: Judgment normal.               Hospital Outpatient Visit on 09/01/2021   Component Date Value   • 25-Hydroxy   Vitamin D 25 09/01/2021 80    • WBC 09/01/2021 5.3    • RBC 09/01/2021 4.99    • Hemoglobin 09/01/2021 15.3    • Hematocrit 09/01/2021 47.7*   • MCV 09/01/2021 95.6    • MCH 09/01/2021 30.7    • MCHC 09/01/2021 32.1*   • RDW 09/01/2021 43.8    • Platelet Count 09/01/2021 328    • MPV 09/01/2021 10.5    • Neutrophils-Polys 09/01/2021 45.90    • Lymphocytes 09/01/2021 43.10*   • Monocytes 09/01/2021 6.60    • Eosinophils 09/01/2021 3.40    • Basophils 09/01/2021 0.60    • Immature Granulocytes 09/01/2021 0.40    • Nucleated RBC 09/01/2021 0.00    • Neutrophils (Absolute) 09/01/2021 2.43    • Lymphs (Absolute) 09/01/2021 2.28    • Monos (Absolute) 09/01/2021 0.35    • Eos (Absolute) 09/01/2021 0.18    • Baso (Absolute) 09/01/2021 0.03    • Immature Granulocytes (a* 09/01/2021 0.02    • NRBC (Absolute) 09/01/2021 0.00    • Glycohemoglobin 09/01/2021 5.7*   • Est Avg Glucose 09/01/2021 117    • Cholesterol,Tot 09/01/2021 177    • Triglycerides 09/01/2021 123    • HDL 09/01/2021 57    • LDL 09/01/2021 95    • Sodium 09/01/2021 140    • Potassium 09/01/2021 3.9    • Chloride 09/01/2021 105    • Co2 09/01/2021 24    • Anion Gap 09/01/2021 11.0    • Glucose 09/01/2021 86    • Bun 09/01/2021 15    • Creatinine 09/01/2021 0.77    • Calcium 09/01/2021 10.0    • AST(SGOT) 09/01/2021 21    • ALT(SGPT) 09/01/2021 9    • Alkaline Phosphatase 09/01/2021 61    • Total Bilirubin 09/01/2021 0.4    • Albumin 09/01/2021 4.5    • Total Protein 09/01/2021 7.2    •  Globulin 09/01/2021 2.7    • A-G Ratio 09/01/2021 1.7    • TSH 09/01/2021 3.830    • Fasting Status 09/01/2021 Fasting    • GFR If  09/01/2021 >60    • GFR If Non  [facility]* 09/01/2021 >60       '                Assessment & Plan        1. Excessive ear wax, left  Procedure left ear irrigated by ED and partially some excess earwax with cerumen spoon.  TM visualized but is not tortuous.  The TM is intact and ear canal is normal.  There is no TM redness or evidence of infection.    2. Mixed hyperlipidemia   Under good control. Continue same regimen.    - TSH; Future  - Comp Metabolic infection.; Future  - Lipid Profile; Future  - CBC WITH DIFFERENTIAL; Future    3. Vitamin D deficiency disease  Under good control. Continue same regimen.      - VITAMIN D,25 HYDROXY; Future    4. Panlobular emphysema (HCC)      Under good control. Continue same regimen.    5. Mild intermittent asthma without complication  Under good control. Continue same regimen.'

## 2022-03-02 ENCOUNTER — HOSPITAL ENCOUNTER (OUTPATIENT)
Dept: LAB | Facility: MEDICAL CENTER | Age: 77
End: 2022-03-02
Attending: INTERNAL MEDICINE
Payer: MEDICARE

## 2022-03-02 DIAGNOSIS — E55.9 VITAMIN D DEFICIENCY: ICD-10-CM

## 2022-03-02 DIAGNOSIS — E78.2 MIXED HYPERLIPIDEMIA: ICD-10-CM

## 2022-03-02 LAB
25(OH)D3 SERPL-MCNC: 83 NG/ML (ref 30–100)
ALBUMIN SERPL BCP-MCNC: 4.8 G/DL (ref 3.2–4.9)
ALBUMIN/GLOB SERPL: 2 G/DL
ALP SERPL-CCNC: 56 U/L (ref 30–99)
ALT SERPL-CCNC: 14 U/L (ref 2–50)
ANION GAP SERPL CALC-SCNC: 12 MMOL/L (ref 7–16)
AST SERPL-CCNC: 24 U/L (ref 12–45)
BASOPHILS # BLD AUTO: 0.6 % (ref 0–1.8)
BASOPHILS # BLD: 0.04 K/UL (ref 0–0.12)
BILIRUB SERPL-MCNC: 0.4 MG/DL (ref 0.1–1.5)
BUN SERPL-MCNC: 13 MG/DL (ref 8–22)
CALCIUM SERPL-MCNC: 10.4 MG/DL (ref 8.5–10.5)
CHLORIDE SERPL-SCNC: 104 MMOL/L (ref 96–112)
CHOLEST SERPL-MCNC: 185 MG/DL (ref 100–199)
CO2 SERPL-SCNC: 23 MMOL/L (ref 20–33)
CREAT SERPL-MCNC: 0.83 MG/DL (ref 0.5–1.4)
EOSINOPHIL # BLD AUTO: 0.15 K/UL (ref 0–0.51)
EOSINOPHIL NFR BLD: 2.4 % (ref 0–6.9)
ERYTHROCYTE [DISTWIDTH] IN BLOOD BY AUTOMATED COUNT: 42.4 FL (ref 35.9–50)
FASTING STATUS PATIENT QL REPORTED: NORMAL
GLOBULIN SER CALC-MCNC: 2.4 G/DL (ref 1.9–3.5)
GLUCOSE SERPL-MCNC: 88 MG/DL (ref 65–99)
HCT VFR BLD AUTO: 46.2 % (ref 37–47)
HDLC SERPL-MCNC: 65 MG/DL
HGB BLD-MCNC: 14.9 G/DL (ref 12–16)
IMM GRANULOCYTES # BLD AUTO: 0.02 K/UL (ref 0–0.11)
IMM GRANULOCYTES NFR BLD AUTO: 0.3 % (ref 0–0.9)
LDLC SERPL CALC-MCNC: 97 MG/DL
LYMPHOCYTES # BLD AUTO: 2.72 K/UL (ref 1–4.8)
LYMPHOCYTES NFR BLD: 43.9 % (ref 22–41)
MCH RBC QN AUTO: 30.6 PG (ref 27–33)
MCHC RBC AUTO-ENTMCNC: 32.3 G/DL (ref 33.6–35)
MCV RBC AUTO: 94.9 FL (ref 81.4–97.8)
MONOCYTES # BLD AUTO: 0.41 K/UL (ref 0–0.85)
MONOCYTES NFR BLD AUTO: 6.6 % (ref 0–13.4)
NEUTROPHILS # BLD AUTO: 2.85 K/UL (ref 2–7.15)
NEUTROPHILS NFR BLD: 46.2 % (ref 44–72)
NRBC # BLD AUTO: 0 K/UL
NRBC BLD-RTO: 0 /100 WBC
PLATELET # BLD AUTO: 375 K/UL (ref 164–446)
PMV BLD AUTO: 10 FL (ref 9–12.9)
POTASSIUM SERPL-SCNC: 4.1 MMOL/L (ref 3.6–5.5)
PROT SERPL-MCNC: 7.2 G/DL (ref 6–8.2)
RBC # BLD AUTO: 4.87 M/UL (ref 4.2–5.4)
SODIUM SERPL-SCNC: 139 MMOL/L (ref 135–145)
TRIGL SERPL-MCNC: 113 MG/DL (ref 0–149)
TSH SERPL DL<=0.005 MIU/L-ACNC: 1.99 UIU/ML (ref 0.38–5.33)
WBC # BLD AUTO: 6.2 K/UL (ref 4.8–10.8)

## 2022-03-02 PROCEDURE — 84443 ASSAY THYROID STIM HORMONE: CPT

## 2022-03-02 PROCEDURE — 36415 COLL VENOUS BLD VENIPUNCTURE: CPT

## 2022-03-02 PROCEDURE — 80053 COMPREHEN METABOLIC PANEL: CPT

## 2022-03-02 PROCEDURE — 85025 COMPLETE CBC W/AUTO DIFF WBC: CPT

## 2022-03-02 PROCEDURE — 80061 LIPID PANEL: CPT

## 2022-03-02 PROCEDURE — 82306 VITAMIN D 25 HYDROXY: CPT

## 2022-03-08 ENCOUNTER — OFFICE VISIT (OUTPATIENT)
Dept: MEDICAL GROUP | Age: 77
End: 2022-03-08
Payer: MEDICARE

## 2022-03-08 VITALS
DIASTOLIC BLOOD PRESSURE: 62 MMHG | HEART RATE: 73 BPM | BODY MASS INDEX: 22.6 KG/M2 | SYSTOLIC BLOOD PRESSURE: 114 MMHG | HEIGHT: 67 IN | TEMPERATURE: 98.6 F | WEIGHT: 144 LBS | OXYGEN SATURATION: 96 % | RESPIRATION RATE: 12 BRPM

## 2022-03-08 DIAGNOSIS — R73.01 IFG (IMPAIRED FASTING GLUCOSE): ICD-10-CM

## 2022-03-08 DIAGNOSIS — E55.9 VITAMIN D DEFICIENCY: ICD-10-CM

## 2022-03-08 DIAGNOSIS — E78.2 MIXED HYPERLIPIDEMIA: ICD-10-CM

## 2022-03-08 DIAGNOSIS — F51.01 PRIMARY INSOMNIA: ICD-10-CM

## 2022-03-08 PROCEDURE — 99214 OFFICE O/P EST MOD 30 MIN: CPT | Performed by: INTERNAL MEDICINE

## 2022-03-08 RX ORDER — ZOLPIDEM TARTRATE 10 MG/1
10 TABLET ORAL NIGHTLY PRN
Qty: 30 EACH | Refills: 5 | Status: SHIPPED
Start: 2022-03-08 | End: 2022-04-07

## 2022-03-08 ASSESSMENT — ENCOUNTER SYMPTOMS
CARDIOVASCULAR NEGATIVE: 1
NEUROLOGICAL NEGATIVE: 1
PSYCHIATRIC NEGATIVE: 1
RESPIRATORY NEGATIVE: 1
MUSCULOSKELETAL NEGATIVE: 1
CONSTITUTIONAL NEGATIVE: 1
GASTROINTESTINAL NEGATIVE: 1
EYES NEGATIVE: 1

## 2022-03-08 ASSESSMENT — PATIENT HEALTH QUESTIONNAIRE - PHQ9: CLINICAL INTERPRETATION OF PHQ2 SCORE: 0

## 2022-03-08 ASSESSMENT — FIBROSIS 4 INDEX: FIB4 SCORE: 1.33

## 2022-03-08 NOTE — PROGRESS NOTES
Subjective     Berenice Mart is a 76 y.o. female who presents with Lab Results (6 months follow up)  The patient is here for followup of chronic medical problems listed below. The patient is compliant with medications and having no side effects from them. Denies chest pain, abdominal pain, dyspnea, myalgias, or cough.   Patient Active Problem List    Diagnosis Date Noted   • Excessive ear wax, left 09/08/2021   • Mild intermittent asthma without complication 09/08/2021   • Primary insomnia 01/04/2018   • Chronic pain syndrome- d/t ddd l/s and cervical spine- pain mgt- rx hc/apap qd and gabapentin 01/04/2018   • Opiate dependence, continuous (HCC)- pain mgt 01/04/2018   • Bladder disorder- OAB -dr. chin (gyn) 05/20/2015   • Chronic allergic rhinitis 04/03/2014   • Vitamin D deficiency disease 12/17/2013   • COPD (chronic obstructive pulmonary disease) ([facility]) 12/17/2013   • Mixed hyperlipidemia 12/01/2011   • DDD (degenerative disc disease), lumbar 12/01/2011   • DDD (degenerative disc disease), cervical 12/01/2011   • Menopausal and postmenopausal disorder 12/01/2011     Allergies   Allergen Reactions   • Codeine    • Morphine Hcl Nausea     Dr Chin aware     Outpatient Medications Prior to Visit   Medication Sig Dispense Refill   • methocarbamol (ROBAXIN) 750 MG Tab      • polyethylene glycol 3350 (MIRALAX) 17 GM/SCOOP Powder polyethylene glycol 3350 17 gram oral powder packet     • dicyclomine (BENTYL) 10 MG Cap Take 1 capsule by mouth 2 times daily, before breakfast and dinner. 180 capsule 3   • sucralfate (CARAFATE) 1 GM Tab Take 1 tablet by mouth 3 times a day before meals. 90 tablet 5   • lovastatin (MEVACOR) 10 MG tablet TAKE ONE TABLET BY MOUTH ONE TIME DAILY 90 tablet 4   • fenofibrate micronized (LOFIBRA) 134 MG capsule TAKE ONE CAPSULE BY MOUTH IN THE MORNING BEFORE BREAKFAST. 90 capsule 4   • albuterol (PROAIR HFA) 108 (90 Base) MCG/ACT Aero Soln inhalation aerosol Inhale 2 Puffs every 6  hours as needed for Shortness of Breath. 8.5 g 11   • estradiol (CLIMARA) 0.0375 MG/24HR patch estradiol 0.0375 mg/24 hr weekly transdermal patch     • gabapentin (NEURONTIN) 100 MG Cap      • ibuprofen (MOTRIN) 600 MG Tab ibuprofen 600 mg tablet     • sulfamethoxazole-trimethoprim (BACTRIM DS) 800-160 MG tablet sulfamethoxazole 800 mg-trimethoprim 160 mg tablet   TK 1 T PO  BID     • HYDROcodone/acetaminophen (NORCO)  MG Tab hydrocodone 10 mg-acetaminophen 325 mg tablet     • fluticasone (FLONASE) 50 MCG/ACT nasal spray Spray 2 Sprays in nose every day. 16 g 12   • Mirabegron ER (MYRBETRIQ) 25 MG TABLET SR 24 HR Take 1 Each by mouth 2 Times a Day. 180 Tab 4     No facility-administered medications prior to visit.     Hospital Outpatient Visit on 03/02/2022   Component Date Value   • TSH 03/02/2022 1.990    • Sodium 03/02/2022 139    • Potassium 03/02/2022 4.1    • Chloride 03/02/2022 104    • Co2 03/02/2022 23    • Anion Gap 03/02/2022 12.0    • Glucose 03/02/2022 88    • Bun 03/02/2022 13    • Creatinine 03/02/2022 0.83    • Calcium 03/02/2022 10.4    • AST(SGOT) 03/02/2022 24    • ALT(SGPT) 03/02/2022 14    • Alkaline Phosphatase 03/02/2022 56    • Total Bilirubin 03/02/2022 0.4    • Albumin 03/02/2022 4.8    • Total Protein 03/02/2022 7.2    • Globulin 03/02/2022 2.4    • A-G Ratio 03/02/2022 2.0    • Cholesterol,Tot 03/02/2022 185    • Triglycerides 03/02/2022 113    • HDL 03/02/2022 65    • LDL 03/02/2022 97    • WBC 03/02/2022 6.2    • RBC 03/02/2022 4.87    • Hemoglobin 03/02/2022 14.9    • Hematocrit 03/02/2022 46.2    • MCV 03/02/2022 94.9    • MCH 03/02/2022 30.6    • MCHC 03/02/2022 32.3 (A)   • RDW 03/02/2022 42.4    • Platelet Count 03/02/2022 375    • MPV 03/02/2022 10.0    • Neutrophils-Polys 03/02/2022 46.20    • Lymphocytes 03/02/2022 43.90 (A)   • Monocytes 03/02/2022 6.60    • Eosinophils 03/02/2022 2.40    • Basophils 03/02/2022 0.60    • Immature Granulocytes 03/02/2022 0.30    •  Nucleated RBC 03/02/2022 0.00    • Neutrophils (Absolute) 03/02/2022 2.85    • Lymphs (Absolute) 03/02/2022 2.72    • Monos (Absolute) 03/02/2022 0.41    • Eos (Absolute) 03/02/2022 0.15    • Baso (Absolute) 03/02/2022 0.04    • Immature Granulocytes (a* 03/02/2022 0.02    • NRBC (Absolute) 03/02/2022 0.00    • 25-Hydroxy   Vitamin D 25 03/02/2022 83    • Fasting Status 03/02/2022 Fasting    • GFR If  03/02/2022 >60    • GFR If Non  [employer]* 03/02/2022 >60       Lab Results   Component Value Date/Time    HBA1C 5.7 (H) 09/01/2021 08:26 AM    HBA1C 5.9 (H) 03/03/2021 09:19 AM     Lab Results   Component Value Date/Time    SODIUM 139 03/02/2022 12:39 PM    POTASSIUM 4.1 03/02/2022 12:39 PM    CHLORIDE 104 03/02/2022 12:39 PM    CO2 23 03/02/2022 12:39 PM    GLUCOSE 88 03/02/2022 12:39 PM    BUN 13 03/02/2022 12:39 PM    CREATININE 0.83 03/02/2022 12:39 PM    CREATININE 0.7 08/17/2006 08:15 AM    ALKPHOSPHAT 56 03/02/2022 12:39 PM    ASTSGOT 24 03/02/2022 12:39 PM    ALTSGPT 14 03/02/2022 12:39 PM    TBILIRUBIN 0.4 03/02/2022 12:39 PM     Lab Results   Component Value Date/Time    INR 1.03 12/07/2020 09:10 AM     Lab Results   Component Value Date/Time    CHOLSTRLTOT 185 03/02/2022 12:39 PM    LDL 97 03/02/2022 12:39 PM    HDL 65 03/02/2022 12:39 PM    TRIGLYCERIDE 113 03/02/2022 12:39 PM       No results found for: TESTOSTERONE  No results found for: TSH  Lab Results   Component Value Date/Time    FREET4 0.80 04/26/2018 12:25 PM    FREET4 0.95 03/24/2014 07:48 AM     Lab Results   Component Value Date/Time    URICACID 3.4 05/14/2012 07:53 AM     No components found for: VITB12  Lab Results   Component Value Date/Time    25HYDROXY 83 03/02/2022 12:39 PM    25HYDROXY 80 09/01/2021 08:26 AM               HPI    Review of Systems   Constitutional: Negative.    HENT: Negative.    Eyes: Negative.    Respiratory: Negative.    Cardiovascular: Negative.    Gastrointestinal: Negative.    Genitourinary:  "Negative.    Musculoskeletal: Negative.    Skin: Negative.    Neurological: Negative.    Endo/Heme/Allergies: Negative.    Psychiatric/Behavioral: Negative.               Objective     /62   Pulse 73   Temp 37 °C (98.6 °F) (Temporal)   Resp 12   Ht 1.689 m (5' 6.5\")   Wt 65.3 kg (144 lb)   LMP 12/01/1976   SpO2 96%   BMI 22.89 kg/m²      Physical Exam  Vitals reviewed.   Constitutional:       General: She is not in acute distress.     Appearance: She is well-developed. She is not diaphoretic.   HENT:      Head: Normocephalic and atraumatic.      Right Ear: External ear normal.      Left Ear: External ear normal.      Nose: Nose normal.      Mouth/Throat:      Pharynx: No oropharyngeal exudate.   Eyes:      General: No scleral icterus.        Right eye: No discharge.         Left eye: No discharge.      Conjunctiva/sclera: Conjunctivae normal.      Pupils: Pupils are equal, round, and reactive to light.   Neck:      Thyroid: No thyromegaly.      Vascular: No JVD.      Trachea: No tracheal deviation.   Cardiovascular:      Rate and Rhythm: Normal rate and regular rhythm.      Heart sounds: Normal heart sounds. No murmur heard.    No friction rub. No gallop.   Pulmonary:      Effort: Pulmonary effort is normal. No respiratory distress.      Breath sounds: Normal breath sounds. No stridor. No wheezing or rales.   Chest:      Chest wall: No tenderness.   Abdominal:      General: Bowel sounds are normal. There is no distension.      Palpations: Abdomen is soft. There is no mass.      Tenderness: There is no abdominal tenderness. There is no guarding or rebound.   Musculoskeletal:         General: No tenderness. Normal range of motion.      Cervical back: Normal range of motion and neck supple.   Lymphadenopathy:      Cervical: No cervical adenopathy.   Skin:     General: Skin is warm and dry.      Coloration: Skin is not pale.      Findings: No erythema or rash.   Neurological:      Mental Status: She is " alert and oriented to person, place, and time.      Cranial Nerves: No cranial nerve deficit.      Motor: No abnormal muscle tone.      Coordination: Coordination normal.      Deep Tendon Reflexes: Reflexes are normal and symmetric. Reflexes normal.   Psychiatric:         Behavior: Behavior normal.         Thought Content: Thought content normal.         Judgment: Judgment normal.                             Assessment & Plan        1. Mixed hyperlipidemia    Under good control. Continue same regimen.    - Comp Metabolic Panel; Future  - Lipid Profile; Future  - CBC WITH DIFFERENTIAL; Future  - TSH; Future  - VITAMIN D,25 HYDROXY; Future    2. Vitamin D deficiency  Under good control. Continue same regimen.        - VITAMIN D,25 HYDROXY; Future    3. IFG (impaired fasting glucose)  Under good control. Continue same regimen.      - HEMOGLOBIN A1C; Future    4. Primary insomnia     - zolpidem (AMBIEN) 10 MG Tab; Take 1 Tablet by mouth at bedtime as needed for Sleep for up to 30 days.  Dispense: 30 Each; Refill: 5

## 2022-04-06 DIAGNOSIS — E78.2 MIXED HYPERLIPIDEMIA: ICD-10-CM

## 2022-04-06 RX ORDER — LOVASTATIN 10 MG/1
TABLET ORAL
Qty: 90 TABLET | Refills: 0 | Status: SHIPPED | OUTPATIENT
Start: 2022-04-06 | End: 2022-07-11

## 2022-04-06 RX ORDER — FENOFIBRATE 134 MG/1
CAPSULE ORAL
Qty: 90 CAPSULE | Refills: 0 | Status: SHIPPED | OUTPATIENT
Start: 2022-04-06 | End: 2022-07-11

## 2022-07-08 DIAGNOSIS — E78.2 MIXED HYPERLIPIDEMIA: ICD-10-CM

## 2022-07-11 RX ORDER — LOVASTATIN 10 MG/1
TABLET ORAL
Qty: 90 TABLET | Refills: 4 | Status: SHIPPED
Start: 2022-07-11 | End: 2023-04-12

## 2022-07-11 RX ORDER — FENOFIBRATE 134 MG/1
CAPSULE ORAL
Qty: 90 CAPSULE | Refills: 4 | Status: SHIPPED | OUTPATIENT
Start: 2022-07-11 | End: 2023-04-12 | Stop reason: SDUPTHER

## 2022-08-02 ENCOUNTER — HOSPITAL ENCOUNTER (OUTPATIENT)
Dept: RADIOLOGY | Facility: MEDICAL CENTER | Age: 77
End: 2022-08-02
Attending: NURSE PRACTITIONER
Payer: MEDICARE

## 2022-08-02 DIAGNOSIS — M25.551 PAIN IN RIGHT HIP: ICD-10-CM

## 2022-08-02 PROCEDURE — 73502 X-RAY EXAM HIP UNI 2-3 VIEWS: CPT | Mod: RT

## 2022-09-01 ENCOUNTER — APPOINTMENT (RX ONLY)
Dept: URBAN - METROPOLITAN AREA CLINIC 4 | Facility: CLINIC | Age: 77
Setting detail: DERMATOLOGY
End: 2022-09-01

## 2022-09-01 DIAGNOSIS — Z71.89 OTHER SPECIFIED COUNSELING: ICD-10-CM

## 2022-09-01 DIAGNOSIS — L82.1 OTHER SEBORRHEIC KERATOSIS: ICD-10-CM

## 2022-09-01 DIAGNOSIS — L57.0 ACTINIC KERATOSIS: ICD-10-CM

## 2022-09-01 DIAGNOSIS — Z85.828 PERSONAL HISTORY OF OTHER MALIGNANT NEOPLASM OF SKIN: ICD-10-CM

## 2022-09-01 DIAGNOSIS — D18.0 HEMANGIOMA: ICD-10-CM

## 2022-09-01 DIAGNOSIS — L81.4 OTHER MELANIN HYPERPIGMENTATION: ICD-10-CM

## 2022-09-01 DIAGNOSIS — F42.4 EXCORIATION (SKIN-PICKING) DISORDER: ICD-10-CM

## 2022-09-01 PROBLEM — D18.01 HEMANGIOMA OF SKIN AND SUBCUTANEOUS TISSUE: Status: ACTIVE | Noted: 2022-09-01

## 2022-09-01 PROBLEM — S00.401A UNSPECIFIED SUPERFICIAL INJURY OF RIGHT EAR, INITIAL ENCOUNTER: Status: ACTIVE | Noted: 2022-09-01

## 2022-09-01 PROCEDURE — 17000 DESTRUCT PREMALG LESION: CPT

## 2022-09-01 PROCEDURE — ? ADDITIONAL NOTES

## 2022-09-01 PROCEDURE — ? LIQUID NITROGEN

## 2022-09-01 PROCEDURE — ? COUNSELING

## 2022-09-01 PROCEDURE — ? OBSERVATION

## 2022-09-01 PROCEDURE — 99213 OFFICE O/P EST LOW 20 MIN: CPT | Mod: 25

## 2022-09-01 ASSESSMENT — LOCATION DETAILED DESCRIPTION DERM
LOCATION DETAILED: LEFT ANTIHELIX
LOCATION DETAILED: INFERIOR MID FOREHEAD
LOCATION DETAILED: EPIGASTRIC SKIN
LOCATION DETAILED: LEFT ANTERIOR SHOULDER
LOCATION DETAILED: RIGHT INFERIOR CRUS OF ANTIHELIX
LOCATION DETAILED: RIGHT CAVUM CONCHA
LOCATION DETAILED: LEFT ANTERIOR PROXIMAL UPPER ARM
LOCATION DETAILED: RIGHT ANTERIOR PROXIMAL UPPER ARM
LOCATION DETAILED: RIGHT RADIAL DORSAL HAND
LOCATION DETAILED: LEFT RADIAL DORSAL HAND

## 2022-09-01 ASSESSMENT — LOCATION SIMPLE DESCRIPTION DERM
LOCATION SIMPLE: LEFT SHOULDER
LOCATION SIMPLE: LEFT HAND
LOCATION SIMPLE: INFERIOR FOREHEAD
LOCATION SIMPLE: ABDOMEN
LOCATION SIMPLE: LEFT EAR
LOCATION SIMPLE: RIGHT EAR
LOCATION SIMPLE: RIGHT HAND
LOCATION SIMPLE: LEFT UPPER ARM
LOCATION SIMPLE: RIGHT UPPER ARM

## 2022-09-01 ASSESSMENT — LOCATION ZONE DERM
LOCATION ZONE: ARM
LOCATION ZONE: FACE
LOCATION ZONE: TRUNK
LOCATION ZONE: EAR
LOCATION ZONE: HAND

## 2022-09-01 NOTE — PROCEDURE: ADDITIONAL NOTES
Detail Level: Simple
Additional Notes: Recheck in one month. Advised to use TAC on the area before the appointment.
Render Risk Assessment In Note?: no

## 2022-09-02 ENCOUNTER — HOSPITAL ENCOUNTER (OUTPATIENT)
Dept: LAB | Facility: MEDICAL CENTER | Age: 77
End: 2022-09-02
Attending: INTERNAL MEDICINE
Payer: MEDICARE

## 2022-09-02 DIAGNOSIS — E78.2 MIXED HYPERLIPIDEMIA: ICD-10-CM

## 2022-09-02 DIAGNOSIS — E55.9 VITAMIN D DEFICIENCY: ICD-10-CM

## 2022-09-02 DIAGNOSIS — R73.01 IFG (IMPAIRED FASTING GLUCOSE): ICD-10-CM

## 2022-09-02 LAB
25(OH)D3 SERPL-MCNC: 91 NG/ML (ref 30–100)
ALBUMIN SERPL BCP-MCNC: 4.9 G/DL (ref 3.2–4.9)
ALBUMIN/GLOB SERPL: 2.2 G/DL
ALP SERPL-CCNC: 62 U/L (ref 30–99)
ALT SERPL-CCNC: 9 U/L (ref 2–50)
ANION GAP SERPL CALC-SCNC: 11 MMOL/L (ref 7–16)
AST SERPL-CCNC: 16 U/L (ref 12–45)
BASOPHILS # BLD AUTO: 0.5 % (ref 0–1.8)
BASOPHILS # BLD: 0.04 K/UL (ref 0–0.12)
BILIRUB SERPL-MCNC: 0.3 MG/DL (ref 0.1–1.5)
BUN SERPL-MCNC: 12 MG/DL (ref 8–22)
CALCIUM SERPL-MCNC: 9.5 MG/DL (ref 8.5–10.5)
CHLORIDE SERPL-SCNC: 103 MMOL/L (ref 96–112)
CHOLEST SERPL-MCNC: 182 MG/DL (ref 100–199)
CO2 SERPL-SCNC: 24 MMOL/L (ref 20–33)
CREAT SERPL-MCNC: 0.86 MG/DL (ref 0.5–1.4)
EOSINOPHIL # BLD AUTO: 0.15 K/UL (ref 0–0.51)
EOSINOPHIL NFR BLD: 2 % (ref 0–6.9)
ERYTHROCYTE [DISTWIDTH] IN BLOOD BY AUTOMATED COUNT: 43.1 FL (ref 35.9–50)
EST. AVERAGE GLUCOSE BLD GHB EST-MCNC: 117 MG/DL
FASTING STATUS PATIENT QL REPORTED: NORMAL
GFR SERPLBLD CREATININE-BSD FMLA CKD-EPI: 69 ML/MIN/1.73 M 2
GLOBULIN SER CALC-MCNC: 2.2 G/DL (ref 1.9–3.5)
GLUCOSE SERPL-MCNC: 89 MG/DL (ref 65–99)
HBA1C MFR BLD: 5.7 % (ref 4–5.6)
HCT VFR BLD AUTO: 45.5 % (ref 37–47)
HDLC SERPL-MCNC: 62 MG/DL
HGB BLD-MCNC: 14.7 G/DL (ref 12–16)
IMM GRANULOCYTES # BLD AUTO: 0.03 K/UL (ref 0–0.11)
IMM GRANULOCYTES NFR BLD AUTO: 0.4 % (ref 0–0.9)
LDLC SERPL CALC-MCNC: 98 MG/DL
LYMPHOCYTES # BLD AUTO: 2.08 K/UL (ref 1–4.8)
LYMPHOCYTES NFR BLD: 27.4 % (ref 22–41)
MCH RBC QN AUTO: 30.7 PG (ref 27–33)
MCHC RBC AUTO-ENTMCNC: 32.3 G/DL (ref 33.6–35)
MCV RBC AUTO: 95 FL (ref 81.4–97.8)
MONOCYTES # BLD AUTO: 0.61 K/UL (ref 0–0.85)
MONOCYTES NFR BLD AUTO: 8 % (ref 0–13.4)
NEUTROPHILS # BLD AUTO: 4.69 K/UL (ref 2–7.15)
NEUTROPHILS NFR BLD: 61.7 % (ref 44–72)
NRBC # BLD AUTO: 0 K/UL
NRBC BLD-RTO: 0 /100 WBC
PLATELET # BLD AUTO: 351 K/UL (ref 164–446)
PMV BLD AUTO: 10 FL (ref 9–12.9)
POTASSIUM SERPL-SCNC: 4.4 MMOL/L (ref 3.6–5.5)
PROT SERPL-MCNC: 7.1 G/DL (ref 6–8.2)
RBC # BLD AUTO: 4.79 M/UL (ref 4.2–5.4)
SODIUM SERPL-SCNC: 138 MMOL/L (ref 135–145)
TRIGL SERPL-MCNC: 111 MG/DL (ref 0–149)
TSH SERPL DL<=0.005 MIU/L-ACNC: 1.73 UIU/ML (ref 0.38–5.33)
WBC # BLD AUTO: 7.6 K/UL (ref 4.8–10.8)

## 2022-09-02 PROCEDURE — 36415 COLL VENOUS BLD VENIPUNCTURE: CPT | Mod: GA

## 2022-09-02 PROCEDURE — 80061 LIPID PANEL: CPT

## 2022-09-02 PROCEDURE — 85025 COMPLETE CBC W/AUTO DIFF WBC: CPT

## 2022-09-02 PROCEDURE — 80053 COMPREHEN METABOLIC PANEL: CPT

## 2022-09-02 PROCEDURE — 82306 VITAMIN D 25 HYDROXY: CPT

## 2022-09-02 PROCEDURE — 84443 ASSAY THYROID STIM HORMONE: CPT

## 2022-09-02 PROCEDURE — 83036 HEMOGLOBIN GLYCOSYLATED A1C: CPT | Mod: GA

## 2022-09-08 ENCOUNTER — OFFICE VISIT (OUTPATIENT)
Dept: MEDICAL GROUP | Age: 77
End: 2022-09-08
Payer: MEDICARE

## 2022-09-08 VITALS
SYSTOLIC BLOOD PRESSURE: 120 MMHG | BODY MASS INDEX: 22.89 KG/M2 | OXYGEN SATURATION: 97 % | TEMPERATURE: 96.8 F | HEIGHT: 67 IN | DIASTOLIC BLOOD PRESSURE: 70 MMHG | RESPIRATION RATE: 16 BRPM | HEART RATE: 70 BPM

## 2022-09-08 DIAGNOSIS — R73.01 IFG (IMPAIRED FASTING GLUCOSE): ICD-10-CM

## 2022-09-08 DIAGNOSIS — E78.2 MIXED HYPERLIPIDEMIA: ICD-10-CM

## 2022-09-08 DIAGNOSIS — E55.9 VITAMIN D DEFICIENCY: ICD-10-CM

## 2022-09-08 DIAGNOSIS — Z12.31 ENCOUNTER FOR SCREENING MAMMOGRAM FOR BREAST CANCER: ICD-10-CM

## 2022-09-08 PROCEDURE — 99214 OFFICE O/P EST MOD 30 MIN: CPT | Performed by: INTERNAL MEDICINE

## 2022-09-08 ASSESSMENT — ENCOUNTER SYMPTOMS
NEUROLOGICAL NEGATIVE: 1
MUSCULOSKELETAL NEGATIVE: 1
EYES NEGATIVE: 1
RESPIRATORY NEGATIVE: 1
CONSTITUTIONAL NEGATIVE: 1
PSYCHIATRIC NEGATIVE: 1
GASTROINTESTINAL NEGATIVE: 1
CARDIOVASCULAR NEGATIVE: 1

## 2022-09-08 NOTE — PROGRESS NOTES
Subjective     Berenice Mart is a 77 y.o. female who presents with Follow-Up (Lab review )  The patient is here for followup of chronic medical problems listed below. The patient is compliant with medications and having no side effects from them. Denies chest pain, abdominal pain, dyspnea, myalgias, or cough.   Patient Active Problem List    Diagnosis Date Noted    Excessive ear wax, left 09/08/2021    Mild intermittent asthma without complication 09/08/2021    Primary insomnia 01/04/2018    Chronic pain syndrome- d/t ddd l/s and cervical spine- pain mgt- rx hc/apap qd and gabapentin 01/04/2018    Opiate dependence, continuous (HCC)- pain mgt 01/04/2018    Bladder disorder- OAB -dr. willoughby (gyn) 05/20/2015    Chronic allergic rhinitis 04/03/2014    Vitamin D deficiency disease 12/17/2013    COPD (chronic obstructive pulmonary disease) (HCC) 12/17/2013    Mixed hyperlipidemia 12/01/2011    DDD (degenerative disc disease), lumbar 12/01/2011    DDD (degenerative disc disease), cervical 12/01/2011    Menopausal and postmenopausal disorder 12/01/2011     Outpatient Medications Prior to Visit   Medication Sig Dispense Refill    fenofibrate micronized (LOFIBRA) 134 MG capsule TAKE ONE CAPSULE BY MOUTH EVERY MORNING BEFORE BREAKFAST 90 Capsule 4    lovastatin (MEVACOR) 10 MG tablet TAKE ONE TABLET BY MOUTH ONCE DAILY 90 Tablet 4    dicyclomine (BENTYL) 10 MG Cap TAKE ONE CAPSULE BY MOUTH TWO TIMES A DAY. before breakfast and dinner 180 Capsule 4    methocarbamol (ROBAXIN) 750 MG Tab       polyethylene glycol 3350 (MIRALAX) 17 GM/SCOOP Powder polyethylene glycol 3350 17 gram oral powder packet      sucralfate (CARAFATE) 1 GM Tab Take 1 tablet by mouth 3 times a day before meals. 90 tablet 5    albuterol (PROAIR HFA) 108 (90 Base) MCG/ACT Aero Soln inhalation aerosol Inhale 2 Puffs every 6 hours as needed for Shortness of Breath. 8.5 g 11    estradiol (CLIMARA) 0.0375 MG/24HR patch estradiol 0.0375 mg/24 hr weekly  transdermal patch      gabapentin (NEURONTIN) 100 MG Cap       ibuprofen (MOTRIN) 600 MG Tab ibuprofen 600 mg tablet      sulfamethoxazole-trimethoprim (BACTRIM DS) 800-160 MG tablet sulfamethoxazole 800 mg-trimethoprim 160 mg tablet   TK 1 T PO  BID      HYDROcodone/acetaminophen (NORCO)  MG Tab hydrocodone 10 mg-acetaminophen 325 mg tablet      fluticasone (FLONASE) 50 MCG/ACT nasal spray Spray 2 Sprays in nose every day. 16 g 12    Mirabegron ER (MYRBETRIQ) 25 MG TABLET SR 24 HR Take 1 Each by mouth 2 Times a Day. 180 Tab 4     No facility-administered medications prior to visit.     Allergies   Allergen Reactions    Codeine     Morphine Hcl Nausea     Dr Chin aware     Hospital Outpatient Visit on 09/02/2022   Component Date Value    Glycohemoglobin 09/02/2022 5.7 (A)    Est Avg Glucose 09/02/2022 117     Sodium 09/02/2022 138     Potassium 09/02/2022 4.4     Chloride 09/02/2022 103     Co2 09/02/2022 24     Anion Gap 09/02/2022 11.0     Glucose 09/02/2022 89     Bun 09/02/2022 12     Creatinine 09/02/2022 0.86     Calcium 09/02/2022 9.5     AST(SGOT) 09/02/2022 16     ALT(SGPT) 09/02/2022 9     Alkaline Phosphatase 09/02/2022 62     Total Bilirubin 09/02/2022 0.3     Albumin 09/02/2022 4.9     Total Protein 09/02/2022 7.1     Globulin 09/02/2022 2.2     A-G Ratio 09/02/2022 2.2     Cholesterol,Tot 09/02/2022 182     Triglycerides 09/02/2022 111     HDL 09/02/2022 62     LDL 09/02/2022 98     WBC 09/02/2022 7.6     RBC 09/02/2022 4.79     Hemoglobin 09/02/2022 14.7     Hematocrit 09/02/2022 45.5     MCV 09/02/2022 95.0     MCH 09/02/2022 30.7     MCHC 09/02/2022 32.3 (A)    RDW 09/02/2022 43.1     Platelet Count 09/02/2022 351     MPV 09/02/2022 10.0     Neutrophils-Polys 09/02/2022 61.70     Lymphocytes 09/02/2022 27.40     Monocytes 09/02/2022 8.00     Eosinophils 09/02/2022 2.00     Basophils 09/02/2022 0.50     Immature Granulocytes 09/02/2022 0.40     Nucleated RBC 09/02/2022 0.00     Neutrophils  (Absolute) 09/02/2022 4.69     Lymphs (Absolute) 09/02/2022 2.08     Monos (Absolute) 09/02/2022 0.61     Eos (Absolute) 09/02/2022 0.15     Baso (Absolute) 09/02/2022 0.04     Immature Granulocytes (a* 09/02/2022 0.03     NRBC (Absolute) 09/02/2022 0.00     TSH 09/02/2022 1.730     25-Hydroxy   Vitamin D 25 09/02/2022 91     Fasting Status 09/02/2022 Fasting     GFR (CKD-EPI) 09/02/2022 69       Lab Results   Component Value Date/Time    HBA1C 5.7 (H) 09/02/2022 11:28 AM    HBA1C 5.7 (H) 09/01/2021 08:26 AM     Lab Results   Component Value Date/Time    SODIUM 138 09/02/2022 11:28 AM    POTASSIUM 4.4 09/02/2022 11:28 AM    CHLORIDE 103 09/02/2022 11:28 AM    CO2 24 09/02/2022 11:28 AM    GLUCOSE 89 09/02/2022 11:28 AM    BUN 12 09/02/2022 11:28 AM    CREATININE 0.86 09/02/2022 11:28 AM    CREATININE 0.7 08/17/2006 08:15 AM    ALKPHOSPHAT 62 09/02/2022 11:28 AM    ASTSGOT 16 09/02/2022 11:28 AM    ALTSGPT 9 09/02/2022 11:28 AM    TBILIRUBIN 0.3 09/02/2022 11:28 AM     Lab Results   Component Value Date/Time    INR 1.03 12/07/2020 09:10 AM     Lab Results   Component Value Date/Time    CHOLSTRLTOT 182 09/02/2022 11:28 AM    LDL 98 09/02/2022 11:28 AM    HDL 62 09/02/2022 11:28 AM    TRIGLYCERIDE 111 09/02/2022 11:28 AM       No results found for: TESTOSTERONE  No results found for: TSH  Lab Results   Component Value Date/Time    FREET4 0.80 04/26/2018 12:25 PM    FREET4 0.95 03/24/2014 07:48 AM     Lab Results   Component Value Date/Time    URICACID 3.4 05/14/2012 07:53 AM     No components found for: VITB12  Lab Results   Component Value Date/Time    25HYDROXY 91 09/02/2022 11:28 AM    25HYDROXY 83 03/02/2022 12:39 PM               HPI    Review of Systems   Constitutional: Negative.    HENT: Negative.     Eyes: Negative.    Respiratory: Negative.     Cardiovascular: Negative.    Gastrointestinal: Negative.    Genitourinary: Negative.    Musculoskeletal: Negative.    Skin: Negative.    Neurological: Negative.   "  Endo/Heme/Allergies: Negative.    Psychiatric/Behavioral: Negative.              Objective     /70 (BP Location: Right arm, Patient Position: Sitting, BP Cuff Size: Adult)   Pulse 70   Temp 36 °C (96.8 °F) (Temporal)   Resp 16   Ht 1.689 m (5' 6.5\")   LMP 12/01/1976   SpO2 97%   BMI 22.89 kg/m²      Physical Exam  Vitals reviewed.   Constitutional:       General: She is not in acute distress.     Appearance: She is well-developed. She is not diaphoretic.   HENT:      Head: Normocephalic and atraumatic.      Right Ear: External ear normal.      Left Ear: External ear normal.      Nose: Nose normal.      Mouth/Throat:      Pharynx: No oropharyngeal exudate.   Eyes:      General: No scleral icterus.        Right eye: No discharge.         Left eye: No discharge.      Conjunctiva/sclera: Conjunctivae normal.      Pupils: Pupils are equal, round, and reactive to light.   Neck:      Thyroid: No thyromegaly.      Vascular: No JVD.      Trachea: No tracheal deviation.   Cardiovascular:      Rate and Rhythm: Normal rate and regular rhythm.      Heart sounds: Normal heart sounds. No murmur heard.    No friction rub. No gallop.   Pulmonary:      Effort: Pulmonary effort is normal. No respiratory distress.      Breath sounds: Normal breath sounds. No stridor. No wheezing or rales.   Chest:      Chest wall: No tenderness.   Abdominal:      General: Bowel sounds are normal. There is no distension.      Palpations: Abdomen is soft. There is no mass.      Tenderness: There is no abdominal tenderness. There is no guarding or rebound.   Musculoskeletal:         General: No tenderness. Normal range of motion.      Cervical back: Normal range of motion and neck supple.   Lymphadenopathy:      Cervical: No cervical adenopathy.   Skin:     General: Skin is warm and dry.      Coloration: Skin is not pale.      Findings: No erythema or rash.   Neurological:      Mental Status: She is alert and oriented to person, place, and " time.      Cranial Nerves: No cranial nerve deficit.      Motor: No abnormal muscle tone.      Coordination: Coordination normal.      Deep Tendon Reflexes: Reflexes are normal and symmetric. Reflexes normal.   Psychiatric:         Behavior: Behavior normal.         Thought Content: Thought content normal.         Judgment: Judgment normal.                           Assessment & Plan        1. Mixed hyperlipidemia    Under good control. Continue same regimen.]    - TSH; Future  - Comp Metabolic Panel; Future  - Lipid Profile; Future  - CBC WITH DIFFERENTIAL; Future    2. Vitamin D deficiency   Under good control. Continue same regimen.]  - VITAMIN D,25 HYDROXY (DEFICIENCY); Future    3. IFG (impaired fasting glucose)   Under good control. Continue same regimen.    - HEMOGLOBIN A1C; Future    4. Encounter for screening mammogram for breast cancer          - MA-SCREENING MAMMO BILAT W/TOMOSYNTHESIS W/CAD; Future

## 2022-09-29 ENCOUNTER — HOSPITAL ENCOUNTER (OUTPATIENT)
Dept: RADIOLOGY | Facility: MEDICAL CENTER | Age: 77
End: 2022-09-29
Attending: INTERNAL MEDICINE
Payer: MEDICARE

## 2022-09-29 DIAGNOSIS — Z12.31 ENCOUNTER FOR SCREENING MAMMOGRAM FOR BREAST CANCER: ICD-10-CM

## 2022-09-29 PROCEDURE — 77063 BREAST TOMOSYNTHESIS BI: CPT

## 2022-10-05 ENCOUNTER — APPOINTMENT (RX ONLY)
Dept: URBAN - METROPOLITAN AREA CLINIC 4 | Facility: CLINIC | Age: 77
Setting detail: DERMATOLOGY
End: 2022-10-05

## 2022-10-05 DIAGNOSIS — Z85.828 PERSONAL HISTORY OF OTHER MALIGNANT NEOPLASM OF SKIN: ICD-10-CM

## 2022-10-05 PROBLEM — D48.5 NEOPLASM OF UNCERTAIN BEHAVIOR OF SKIN: Status: ACTIVE | Noted: 2022-10-05

## 2022-10-05 PROCEDURE — ? OBSERVATION

## 2022-10-05 PROCEDURE — 11102 TANGNTL BX SKIN SINGLE LES: CPT

## 2022-10-05 PROCEDURE — ? COUNSELING

## 2022-10-05 PROCEDURE — ? BIOPSY BY SHAVE METHOD

## 2022-10-05 ASSESSMENT — LOCATION SIMPLE DESCRIPTION DERM: LOCATION SIMPLE: RIGHT EAR

## 2022-10-05 ASSESSMENT — LOCATION DETAILED DESCRIPTION DERM: LOCATION DETAILED: RIGHT INFERIOR CRUS OF ANTIHELIX

## 2022-10-05 ASSESSMENT — LOCATION ZONE DERM: LOCATION ZONE: EAR

## 2022-10-19 ENCOUNTER — RX ONLY (OUTPATIENT)
Age: 77
Setting detail: RX ONLY
End: 2022-10-19

## 2022-10-19 RX ORDER — AMOXICILLIN 500 MG/1
1 CAPSULES CAPSULE ORAL BID
Qty: 14 | Refills: 0 | Status: ERX | COMMUNITY
Start: 2022-10-19

## 2022-11-03 ENCOUNTER — PATIENT MESSAGE (OUTPATIENT)
Dept: HEALTH INFORMATION MANAGEMENT | Facility: OTHER | Age: 77
End: 2022-11-03

## 2022-11-03 ENCOUNTER — APPOINTMENT (RX ONLY)
Dept: URBAN - METROPOLITAN AREA CLINIC 22 | Facility: CLINIC | Age: 77
Setting detail: DERMATOLOGY
End: 2022-11-03

## 2022-11-03 PROBLEM — C44.212 BASAL CELL CARCINOMA OF SKIN OF RIGHT EAR AND EXTERNAL AURICULAR CANAL: Status: ACTIVE | Noted: 2022-11-03

## 2022-11-03 PROCEDURE — 17312 MOHS ADDL STAGE: CPT

## 2022-11-03 PROCEDURE — ? MOHS SURGERY

## 2022-11-03 PROCEDURE — 17311 MOHS 1 STAGE H/N/HF/G: CPT

## 2022-11-03 NOTE — PROCEDURE: MOHS SURGERY
Mohs Case Number: [id number]
Previous Accession (Optional): [id number]
Biopsy Photograph Reviewed: Yes
Referring Physician (Optional): RACHID Galvez
Consent Type: Consent 1 (Standard)
Eye Shield Used: No
Surgeon Performing Repair (Optional): Aaron Hdz M.D.
Initial Size Of Lesion: 0.4
X Size Of Lesion In Cm (Optional): 0.3
Number Of Stages: 5
Primary Defect Length In Cm (Final Defect Size - Required For Flaps/Grafts): 2.1
Primary Defect Width In Cm (Final Defect Size - Required For Flaps/Grafts): 1.9
Repair Type: No repair - secondary intention
Oculoplastic Surgeon Procedure Text (A): After obtaining clear surgical margins the patient was sent to oculoplastics for surgical repair.  The patient understands they will receive post-surgical care and follow-up from the referring physician's office.
Otolaryngologist Procedure Text (A): After obtaining clear surgical margins the patient was sent to otolaryngology for surgical repair.  The patient understands they will receive post-surgical care and follow-up from the referring physician's office.
Plastic Surgeon Procedure Text (A): After obtaining clear surgical margins the patient was sent to plastics for surgical repair.  The patient understands they will receive post-surgical care and follow-up from the referring physician's office.
Mid-Level Procedure Text (A): After obtaining clear surgical margins the patient was sent to a mid-level provider for surgical repair.  The patient understands they will receive post-surgical care and follow-up from the mid-level provider.
Provider Procedure Text (A): After obtaining clear surgical margins the defect was repaired by another provider.
Asc Procedure Text (A): After obtaining clear surgical margins the patient was sent to an ASC for surgical repair.  The patient understands they will receive post-surgical care and follow-up from the ASC physician.
Simple / Intermediate / Complex Repair - Final Wound Length In Cm: 0
Suturegard Retention Suture: 2-0 Nylon
Retention Suture Bite Size: 3 mm
Length To Time In Minutes Device Was In Place: 10
Number Of Hemigard Strips Per Side: 1
Undermining Type: Entire Wound
Debridement Text: The wound edges were debrided prior to proceeding with the closure to facilitate wound healing.
Helical Rim Text: The closure involved the helical rim.
Vermilion Border Text: The closure involved the vermilion border.
Nostril Rim Text: The closure involved the nostril rim.
Retention Suture Text: Retention sutures were placed to support the closure and prevent dehiscence.
Location Indication Override (Is Already Calculated Based On Selected Body Location): Area H
Area H Indication Text: Tumors in this location are included in Area H (eyelids, eyebrows, nose, lips, chin, ear, pre-auricular, post-auricular, temple, genitalia, hands, feet, ankles and areola).  Tissue conservation is critical in these anatomic locations.
Area M Indication Text: Tumors in this location are included in Area M (cheek, forehead, scalp, neck, jawline and pretibial skin).  Mohs surgery is indicated for tumors in these anatomic locations.
Area L Indication Text: Tumors in this location are included in Area L (trunk and extremities).  Mohs surgery is indicated for larger tumors, or tumors with aggressive histologic features, in these anatomic locations.
Tumor Debulked?: curette
Surgical Defect Length In Cm (Optional): 0.7
Surgical Defect Width In Cm (Optional): 0.6
Special Stains Stage 1 - Results: Base On Clearance Noted Above
Stage 2: Additional Anesthesia Volume In Cc: 0.5
Stage 2: Additional Anesthesia Type: 1% lidocaine with epinephrine and a 1:10 solution of 8.4% sodium bicarbonate
Surgical Defect Length In Cm (Optional): 1.1
Surgical Defect Length In Cm (Optional): 1.3
Surgical Defect Length In Cm (Optional): 1.6
Surgical Defect Width In Cm (Optional): 1.5
Include Tumor Staging In Mohs Note?: Please Select the Appropriate Response
Staging Info: By selecting yes to the question above you will include information on AJCC 8 tumor staging in your Mohs note. Information on tumor staging will be automatically added for SCCs on the head and neck. AJCC 8 includes tumor size, tumor depth, perineural involvement and bone invasion.
Tumor Depth: Less than 6mm from granular layer and no invasion beyond the subcutaneous fat
Medical Necessity Statement: Based on my medical judgement, Mohs surgery is the most appropriate treatment for this cancer compared to other treatments.
Alternatives Discussed Intro (Do Not Add Period): I discussed alternative treatments to Mohs surgery and specifically discussed the risks and benefits of
Consent 1/Introductory Paragraph: The rationale for Mohs was explained to the patient and consent was obtained. The risks, benefits and alternatives to therapy were discussed in detail. Specifically, the risks of infection, scarring, bleeding, prolonged wound healing, incomplete removal, allergy to anesthesia, nerve injury and recurrence were addressed. Prior to the procedure, the treatment site was clearly identified and confirmed by the patient. All components of Universal Protocol/PAUSE Rule completed.
Consent 2/Introductory Paragraph: Mohs surgery was explained to the patient and consent was obtained. The risks, benefits and alternatives to therapy were discussed in detail. Specifically, the risks of infection, scarring, bleeding, prolonged wound healing, incomplete removal, allergy to anesthesia, nerve injury and recurrence were addressed. Prior to the procedure, the treatment site was clearly identified and confirmed by the patient. All components of Universal Protocol/PAUSE Rule completed.
Consent 3/Introductory Paragraph: I gave the patient a chance to ask questions they had about the procedure.  Following this I explained the Mohs procedure and consent was obtained. The risks, benefits and alternatives to therapy were discussed in detail. Specifically, the risks of infection, scarring, bleeding, prolonged wound healing, incomplete removal, allergy to anesthesia, nerve injury and recurrence were addressed. Prior to the procedure, the treatment site was clearly identified and confirmed by the patient. All components of Universal Protocol/PAUSE Rule completed.
Consent (Temporal Branch)/Introductory Paragraph: The rationale for Mohs was explained to the patient and consent was obtained. The risks, benefits and alternatives to therapy were discussed in detail. Specifically, the risks of damage to the temporal branch of the facial nerve, infection, scarring, bleeding, prolonged wound healing, incomplete removal, allergy to anesthesia, and recurrence were addressed. Prior to the procedure, the treatment site was clearly identified and confirmed by the patient. All components of Universal Protocol/PAUSE Rule completed.
Consent (Marginal Mandibular)/Introductory Paragraph: The rationale for Mohs was explained to the patient and consent was obtained. The risks, benefits and alternatives to therapy were discussed in detail. Specifically, the risks of damage to the marginal mandibular branch of the facial nerve, infection, scarring, bleeding, prolonged wound healing, incomplete removal, allergy to anesthesia, and recurrence were addressed. Prior to the procedure, the treatment site was clearly identified and confirmed by the patient. All components of Universal Protocol/PAUSE Rule completed.
Consent (Spinal Accessory)/Introductory Paragraph: The rationale for Mohs was explained to the patient and consent was obtained. The risks, benefits and alternatives to therapy were discussed in detail. Specifically, the risks of damage to the spinal accessory nerve, infection, scarring, bleeding, prolonged wound healing, incomplete removal, allergy to anesthesia, and recurrence were addressed. Prior to the procedure, the treatment site was clearly identified and confirmed by the patient. All components of Universal Protocol/PAUSE Rule completed.
Consent (Near Eyelid Margin)/Introductory Paragraph: The rationale for Mohs was explained to the patient and consent was obtained. The risks, benefits and alternatives to therapy were discussed in detail. Specifically, the risks of ectropion or eyelid deformity, infection, scarring, bleeding, prolonged wound healing, incomplete removal, allergy to anesthesia, nerve injury and recurrence were addressed. Prior to the procedure, the treatment site was clearly identified and confirmed by the patient. All components of Universal Protocol/PAUSE Rule completed.
Consent (Ear)/Introductory Paragraph: The rationale for Mohs was explained to the patient and consent was obtained. The risks, benefits and alternatives to therapy were discussed in detail. Specifically, the risks of ear deformity, infection, scarring, bleeding, prolonged wound healing, incomplete removal, allergy to anesthesia, nerve injury and recurrence were addressed. Prior to the procedure, the treatment site was clearly identified and confirmed by the patient. All components of Universal Protocol/PAUSE Rule completed.
Consent (Nose)/Introductory Paragraph: The rationale for Mohs was explained to the patient and consent was obtained. The risks, benefits and alternatives to therapy were discussed in detail. Specifically, the risks of nasal deformity, changes in the flow of air through the nose, infection, scarring, bleeding, prolonged wound healing, incomplete removal, allergy to anesthesia, nerve injury and recurrence were addressed. Prior to the procedure, the treatment site was clearly identified and confirmed by the patient. All components of Universal Protocol/PAUSE Rule completed.
Consent (Lip)/Introductory Paragraph: The rationale for Mohs was explained to the patient and consent was obtained. The risks, benefits and alternatives to therapy were discussed in detail. Specifically, the risks of lip deformity, changes in the oral aperture, infection, scarring, bleeding, prolonged wound healing, incomplete removal, allergy to anesthesia, nerve injury and recurrence were addressed. Prior to the procedure, the treatment site was clearly identified and confirmed by the patient. All components of Universal Protocol/PAUSE Rule completed.
Consent (Scalp)/Introductory Paragraph: The rationale for Mohs was explained to the patient and consent was obtained. The risks, benefits and alternatives to therapy were discussed in detail. Specifically, the risks of changes in hair growth pattern secondary to repair, infection, scarring, bleeding, prolonged wound healing, incomplete removal, allergy to anesthesia, nerve injury and recurrence were addressed. Prior to the procedure, the treatment site was clearly identified and confirmed by the patient. All components of Universal Protocol/PAUSE Rule completed.
Detail Level: Detailed
Postop Diagnosis: Basal Cell Carcinoma (infiltrative)
Anesthesia Volume In Cc: 3
Hemostasis: Electrodesiccation
Estimated Blood Loss (Cc): minimal
Stage 6: Additional Anesthesia Type: 1% lidocaine with epinephrine
Repair Anesthesia Type: sterile water
Brow Lift Text: A midfrontal incision was made medially to the defect to allow access to the tissues just superior to the left eyebrow. Following careful dissection inferiorly in a supraperiosteal plane to the level of the left eyebrow, several 3-0 monocryl sutures were used to resuspend the eyebrow orbicularis oculi muscular unit to the superior frontal bone periosteum. This resulted in an appropriate reapproximation of static eyebrow symmetry and correction of the left brow ptosis.
Epidermal Closure: none-secondary intention
Suturegard Intro: Intraoperative tissue expansion was performed, utilizing the SUTUREGARD device, in order to reduce wound tension.
Suturegard Body: The suture ends were repeatedly re-tightened and re-clamped to achieve the desired tissue expansion.
Hemigard Intro: Due to skin fragility and wound tension, it was decided to use HEMIGARD adhesive retention suture devices to permit a linear closure. The skin was cleaned and dried for a 6cm distance away from the wound. Excessive hair, if present, was removed to allow for adhesion.
Hemigard Postcare Instructions: The HEMIGARD strips are to remain completely dry for at least 5-7 days.
Donor Site Anesthesia Type: same as repair anesthesia
Epidermal Closure Graft Donor Site (Optional): simple interrupted
Graft Donor Site Bandage (Optional-Leave Blank If You Don't Want In Note): Steri-strips and a pressure bandage were applied to the donor site.
Closure 2 Information: This tab is for additional flaps and grafts, including complex repair and grafts and complex repair and flaps. You can also specify a different location for the additional defect, if the location is the same you do not need to select a new one. We will insert the automated text for the repair you select below just as we do for solitary flaps and grafts. Please note that at this time if you select a location with a different insurance zone you will need to override the ICD10 and CPT if appropriate.
Closure 3 Information: This tab is for additional flaps and grafts above and beyond our usual structured repairs.  Please note if you enter information here it will not currently bill and you will need to add the billing information manually.
Wound Care: Petrolatum
Dressing: Puracol
Dressing (No Sutures): dry sterile dressing
Wound Check: 7 days
Unna Boot Text: An Unna boot was placed to help immobilize the limb and facilitate more rapid healing.
Home Suture Removal Text: Patient was provided instructions on removing sutures and will remove their sutures at home.  If they have any questions or difficulties they will call the office.
Post-Care Instructions: I reviewed with the patient in detail post-care instructions. Patient is not to engage in any heavy lifting, exercise, or swimming for the next 14 days. Should the patient develop any fevers, chills, bleeding, severe pain patient will contact the office immediately.
Pain Refusal Text: I offered to prescribe pain medication but the patient refused to take this medication.
Mauc Instructions: By selecting yes to the question below the MAUC number will be added into the note.  This will be calculated automatically based on the diagnosis chosen, the size entered, the body zone selected (H,M,L) and the specific indications you chose. You will also have the option to override the Mohs AUC if you disagree with the automatically calculated number and this option is found in the Case Summary tab.
Where Do You Want The Question To Include Opioid Counseling Located?: Case Summary Tab
Eye Protection Verbiage: Before proceeding with the stage, a plastic scleral shield was inserted. The globe was anesthetized with a few drops of 1% lidocaine with 1:100,000 epinephrine. Then, an appropriate sized scleral shield was chosen and coated with lacrilube ointment. The shield was gently inserted and left in place for the duration of each stage. After the stage was completed, the shield was gently removed.
Mohs Method Verbiage: An incision at a 45 degree angle following the standard Mohs approach was done and the specimen was harvested as a microscopic controlled layer.
Surgeon/Pathologist Verbiage (Will Incorporate Name Of Surgeon From Intro If Not Blank): operated in two distinct and integrated capacities as the surgeon and pathologist.
Mohs Histo Method Verbiage: Each section was then chromacoded and processed in the Mohs lab using the Mohs protocol and submitted for frozen section.
Subsequent Stages Histo Method Verbiage: Using a similar technique to that described above, a thin layer of tissue was removed from all areas where tumor was visible on the previous stage.  The tissue was again oriented, mapped, dyed, and processed as above.
Mohs Rapid Report Verbiage: The area of clinically evident tumor was marked with skin marking ink and appropriately hatched.  The initial incision was made following the Mohs approach through the skin.  The specimen was taken to the lab, divided into the necessary number of pieces, chromacoded and processed according to the Mohs protocol.  This was repeated in successive stages until a tumor free defect was achieved.
Complex Repair Preamble Text (Leave Blank If You Do Not Want): Extensive wide undermining was performed.
Intermediate Repair Preamble Text (Leave Blank If You Do Not Want): Undermining was performed with blunt dissection.
Non-Graft Cartilage Fenestration Text: The cartilage was fenestrated with a 2mm punch biopsy to help facilitate healing.
Graft Cartilage Fenestration Text: The cartilage was fenestrated with a 2mm punch biopsy to help facilitate graft survival and healing.
Secondary Intention Text (Leave Blank If You Do Not Want): The defect will heal with secondary intention.
No Repair - Repaired With Adjacent Surgical Defect Text (Leave Blank If You Do Not Want): After obtaining clear surgical margins the defect was repaired concurrently with another surgical defect which was in close approximation.
Adjacent Tissue Transfer Text: The defect edges were debeveled with a #15 scalpel blade.  Given the location of the defect and the proximity to free margins an adjacent tissue transfer was deemed most appropriate.  Using a sterile surgical marker, an appropriate flap was drawn incorporating the defect and placing the expected incisions within the relaxed skin tension lines where possible.    The area thus outlined was incised deep to adipose tissue with a #15 scalpel blade.  The skin margins were undermined to an appropriate distance in all directions utilizing iris scissors.
Advancement Flap (Single) Text: The defect edges were debeveled with a #15 scalpel blade.  Given the location of the defect and the proximity to free margins a single advancement flap was deemed most appropriate.  Using a sterile surgical marker, an appropriate advancement flap was drawn incorporating the defect and placing the expected incisions within the relaxed skin tension lines where possible.    The area thus outlined was incised deep to adipose tissue with a #15 scalpel blade.  The skin margins were undermined to an appropriate distance in all directions utilizing iris scissors.
Advancement Flap (Double) Text: The defect edges were debeveled with a #15 scalpel blade.  Given the location of the defect and the proximity to free margins a double advancement flap was deemed most appropriate.  Using a sterile surgical marker, the appropriate advancement flaps were drawn incorporating the defect and placing the expected incisions within the relaxed skin tension lines where possible.    The area thus outlined was incised deep to adipose tissue with a #15 scalpel blade.  The skin margins were undermined to an appropriate distance in all directions utilizing iris scissors.
Burow's Advancement Flap Text: The defect edges were debeveled with a #15 scalpel blade.  Given the location of the defect and the proximity to free margins a Burow's advancement flap was deemed most appropriate.  Using a sterile surgical marker, the appropriate advancement flap was drawn incorporating the defect and placing the expected incisions within the relaxed skin tension lines where possible.    The area thus outlined was incised deep to adipose tissue with a #15 scalpel blade.  The skin margins were undermined to an appropriate distance in all directions utilizing iris scissors.
Chonodrocutaneous Helical Advancement Flap Text: The defect edges were debeveled with a #15 scalpel blade.  Given the location of the defect and the proximity to free margins a chondrocutaneous helical advancement flap was deemed most appropriate.  Using a sterile surgical marker, the appropriate advancement flap was drawn incorporating the defect and placing the expected incisions within the relaxed skin tension lines where possible.    The area thus outlined was incised deep to adipose tissue with a #15 scalpel blade.  The skin margins were undermined to an appropriate distance in all directions utilizing iris scissors.
Crescentic Advancement Flap Text: The defect edges were debeveled with a #15 scalpel blade.  Given the location of the defect and the proximity to free margins a crescentic advancement flap was deemed most appropriate.  Using a sterile surgical marker, the appropriate advancement flap was drawn incorporating the defect and placing the expected incisions within the relaxed skin tension lines where possible.    The area thus outlined was incised deep to adipose tissue with a #15 scalpel blade.  The skin margins were undermined to an appropriate distance in all directions utilizing iris scissors.
A-T Advancement Flap Text: The defect edges were debeveled with a #15 scalpel blade.  Given the location of the defect, shape of the defect and the proximity to free margins an A-T advancement flap was deemed most appropriate.  Using a sterile surgical marker, an appropriate advancement flap was drawn incorporating the defect and placing the expected incisions within the relaxed skin tension lines where possible.    The area thus outlined was incised deep to adipose tissue with a #15 scalpel blade.  The skin margins were undermined to an appropriate distance in all directions utilizing iris scissors.
O-T Advancement Flap Text: The defect edges were debeveled with a #15 scalpel blade.  Given the location of the defect, shape of the defect and the proximity to free margins an O-T advancement flap was deemed most appropriate.  Using a sterile surgical marker, an appropriate advancement flap was drawn incorporating the defect and placing the expected incisions within the relaxed skin tension lines where possible.    The area thus outlined was incised deep to adipose tissue with a #15 scalpel blade.  The skin margins were undermined to an appropriate distance in all directions utilizing iris scissors.
O-L Flap Text: The defect edges were debeveled with a #15 scalpel blade.  Given the location of the defect, shape of the defect and the proximity to free margins an O-L flap was deemed most appropriate.  Using a sterile surgical marker, an appropriate advancement flap was drawn incorporating the defect and placing the expected incisions within the relaxed skin tension lines where possible.    The area thus outlined was incised deep to adipose tissue with a #15 scalpel blade.  The skin margins were undermined to an appropriate distance in all directions utilizing iris scissors.
O-Z Flap Text: The defect edges were debeveled with a #15 scalpel blade.  Given the location of the defect, shape of the defect and the proximity to free margins an O-Z flap was deemed most appropriate.  Using a sterile surgical marker, an appropriate transposition flap was drawn incorporating the defect and placing the expected incisions within the relaxed skin tension lines where possible. The area thus outlined was incised deep to adipose tissue with a #15 scalpel blade.  The skin margins were undermined to an appropriate distance in all directions utilizing iris scissors.
Double O-Z Flap Text: The defect edges were debeveled with a #15 scalpel blade.  Given the location of the defect, shape of the defect and the proximity to free margins a Double O-Z flap was deemed most appropriate.  Using a sterile surgical marker, an appropriate transposition flap was drawn incorporating the defect and placing the expected incisions within the relaxed skin tension lines where possible. The area thus outlined was incised deep to adipose tissue with a #15 scalpel blade.  The skin margins were undermined to an appropriate distance in all directions utilizing iris scissors.
V-Y Flap Text: The defect edges were debeveled with a #15 scalpel blade.  Given the location of the defect, shape of the defect and the proximity to free margins a V-Y flap was deemed most appropriate.  Using a sterile surgical marker, an appropriate advancement flap was drawn incorporating the defect and placing the expected incisions within the relaxed skin tension lines where possible.    The area thus outlined was incised deep to adipose tissue with a #15 scalpel blade.  The skin margins were undermined to an appropriate distance in all directions utilizing iris scissors.
Advancement-Rotation Flap Text: The defect edges were debeveled with a #15 scalpel blade.  Given the location of the defect, shape of the defect and the proximity to free margins an advancement-rotation flap was deemed most appropriate.  Using a sterile surgical marker, an appropriate flap was drawn incorporating the defect and placing the expected incisions within the relaxed skin tension lines where possible. The area thus outlined was incised deep to adipose tissue with a #15 scalpel blade.  The skin margins were undermined to an appropriate distance in all directions utilizing iris scissors.
Mercedes Flap Text: The defect edges were debeveled with a #15 scalpel blade.  Given the location of the defect, shape of the defect and the proximity to free margins a Mercedes flap was deemed most appropriate.  Using a sterile surgical marker, an appropriate advancement flap was drawn incorporating the defect and placing the expected incisions within the relaxed skin tension lines where possible. The area thus outlined was incised deep to adipose tissue with a #15 scalpel blade.  The skin margins were undermined to an appropriate distance in all directions utilizing iris scissors.
Modified Advancement Flap Text: The defect edges were debeveled with a #15 scalpel blade.  Given the location of the defect, shape of the defect and the proximity to free margins a modified advancement flap was deemed most appropriate.  Using a sterile surgical marker, an appropriate advancement flap was drawn incorporating the defect and placing the expected incisions within the relaxed skin tension lines where possible.    The area thus outlined was incised deep to adipose tissue with a #15 scalpel blade.  The skin margins were undermined to an appropriate distance in all directions utilizing iris scissors.
Mucosal Advancement Flap Text: Given the location of the defect, shape of the defect and the proximity to free margins a mucosal advancement flap was deemed most appropriate. Incisions were made with a 15 blade scalpel in the appropriate fashion along the cutaneous vermilion border and the mucosal lip. The remaining actinically damaged mucosal tissue was excised.  The mucosal advancement flap was then elevated to the gingival sulcus with care taken to preserve the neurovascular structures and advanced into the primary defect. Care was taken to ensure that precise realignment of the vermilion border was achieved.
Peng Advancement Flap Text: The defect edges were debeveled with a #15 scalpel blade.  Given the location of the defect, shape of the defect and the proximity to free margins a Peng advancement flap was deemed most appropriate.  Using a sterile surgical marker, an appropriate advancement flap was drawn incorporating the defect and placing the expected incisions within the relaxed skin tension lines where possible. The area thus outlined was incised deep to adipose tissue with a #15 scalpel blade.  The skin margins were undermined to an appropriate distance in all directions utilizing iris scissors.
Hatchet Flap Text: The defect edges were debeveled with a #15 scalpel blade.  Given the location of the defect, shape of the defect and the proximity to free margins a hatchet flap was deemed most appropriate.  Using a sterile surgical marker, an appropriate hatchet flap was drawn incorporating the defect and placing the expected incisions within the relaxed skin tension lines where possible.    The area thus outlined was incised deep to adipose tissue with a #15 scalpel blade.  The skin margins were undermined to an appropriate distance in all directions utilizing iris scissors.
Rotation Flap Text: The defect edges were debeveled with a #15 scalpel blade.  Given the location of the defect, shape of the defect and the proximity to free margins a rotation flap was deemed most appropriate.  Using a sterile surgical marker, an appropriate rotation flap was drawn incorporating the defect and placing the expected incisions within the relaxed skin tension lines where possible.    The area thus outlined was incised deep to adipose tissue with a #15 scalpel blade.  The skin margins were undermined to an appropriate distance in all directions utilizing iris scissors.
Spiral Flap Text: The defect edges were debeveled with a #15 scalpel blade.  Given the location of the defect, shape of the defect and the proximity to free margins a spiral flap was deemed most appropriate.  Using a sterile surgical marker, an appropriate rotation flap was drawn incorporating the defect and placing the expected incisions within the relaxed skin tension lines where possible. The area thus outlined was incised deep to adipose tissue with a #15 scalpel blade.  The skin margins were undermined to an appropriate distance in all directions utilizing iris scissors.
Staged Advancement Flap Text: The defect edges were debeveled with a #15 scalpel blade.  Given the location of the defect, shape of the defect and the proximity to free margins a staged advancement flap was deemed most appropriate.  Using a sterile surgical marker, an appropriate advancement flap was drawn incorporating the defect and placing the expected incisions within the relaxed skin tension lines where possible. The area thus outlined was incised deep to adipose tissue with a #15 scalpel blade.  The skin margins were undermined to an appropriate distance in all directions utilizing iris scissors.
Star Wedge Flap Text: The defect edges were debeveled with a #15 scalpel blade.  Given the location of the defect, shape of the defect and the proximity to free margins a star wedge flap was deemed most appropriate.  Using a sterile surgical marker, an appropriate rotation flap was drawn incorporating the defect and placing the expected incisions within the relaxed skin tension lines where possible. The area thus outlined was incised deep to adipose tissue with a #15 scalpel blade.  The skin margins were undermined to an appropriate distance in all directions utilizing iris scissors.
Transposition Flap Text: The defect edges were debeveled with a #15 scalpel blade.  Given the location of the defect and the proximity to free margins a transposition flap was deemed most appropriate.  Using a sterile surgical marker, an appropriate transposition flap was drawn incorporating the defect.    The area thus outlined was incised deep to adipose tissue with a #15 scalpel blade.  The skin margins were undermined to an appropriate distance in all directions utilizing iris scissors.
Muscle Hinge Flap Text: The defect edges were debeveled with a #15 scalpel blade.  Given the size, depth and location of the defect and the proximity to free margins a muscle hinge flap was deemed most appropriate.  Using a sterile surgical marker, an appropriate hinge flap was drawn incorporating the defect. The area thus outlined was incised with a #15 scalpel blade.  The skin margins were undermined to an appropriate distance in all directions utilizing iris scissors.
Mustarde Flap Text: The defect edges were debeveled with a #15 scalpel blade.  Given the size, depth and location of the defect and the proximity to free margins a Mustarde flap was deemed most appropriate.  Using a sterile surgical marker, an appropriate flap was drawn incorporating the defect. The area thus outlined was incised with a #15 scalpel blade.  The skin margins were undermined to an appropriate distance in all directions utilizing iris scissors.
Nasal Turnover Hinge Flap Text: The defect edges were debeveled with a #15 scalpel blade.  Given the size, depth, location of the defect and the defect being full thickness a nasal turnover hinge flap was deemed most appropriate.  Using a sterile surgical marker, an appropriate hinge flap was drawn incorporating the defect. The area thus outlined was incised with a #15 scalpel blade. The flap was designed to recreate the nasal mucosal lining and the alar rim. The skin margins were undermined to an appropriate distance in all directions utilizing iris scissors.
Nasalis-Muscle-Based Myocutaneous Island Pedicle Flap Text: Using a #15 blade, an incision was made around the donor flap to the level of the nasalis muscle. Wide lateral undermining was then performed in both the subcutaneous plane above the nasalis muscle, and in a submuscular plane just above periosteum. This allowed the formation of a free nasalis muscle axial pedicle (based on the angular artery) which was still attached to the actual cutaneous flap, increasing its mobility and vascular viability. Hemostasis was obtained with pinpoint electrocoagulation. The flap was mobilized into position and the pivotal anchor points positioned and stabilized with buried interrupted sutures. Subcutaneous and dermal tissues were closed in a multilayered fashion with sutures. Tissue redundancies were excised, and the epidermal edges were apposed without significant tension and sutured with sutures.
Orbicularis Oris Muscle Flap Text: The defect edges were debeveled with a #15 scalpel blade.  Given that the defect affected the competency of the oral sphincter an orbicularis oris muscle flap was deemed most appropriate to restore this competency and normal muscle function.  Using a sterile surgical marker, an appropriate flap was drawn incorporating the defect. The area thus outlined was incised with a #15 scalpel blade.
Melolabial Transposition Flap Text: The defect edges were debeveled with a #15 scalpel blade.  Given the location of the defect and the proximity to free margins a melolabial flap was deemed most appropriate.  Using a sterile surgical marker, an appropriate melolabial transposition flap was drawn incorporating the defect.    The area thus outlined was incised deep to adipose tissue with a #15 scalpel blade.  The skin margins were undermined to an appropriate distance in all directions utilizing iris scissors.
Rhombic Flap Text: The defect edges were debeveled with a #15 scalpel blade.  Given the location of the defect and the proximity to free margins a rhombic flap was deemed most appropriate.  Using a sterile surgical marker, an appropriate rhombic flap was drawn incorporating the defect.    The area thus outlined was incised deep to adipose tissue with a #15 scalpel blade.  The skin margins were undermined to an appropriate distance in all directions utilizing iris scissors.
Rhomboid Transposition Flap Text: The defect edges were debeveled with a #15 scalpel blade.  Given the location of the defect and the proximity to free margins a rhomboid transposition flap was deemed most appropriate.  Using a sterile surgical marker, an appropriate rhomboid flap was drawn incorporating the defect.    The area thus outlined was incised deep to adipose tissue with a #15 scalpel blade.  The skin margins were undermined to an appropriate distance in all directions utilizing iris scissors.
Bi-Rhombic Flap Text: The defect edges were debeveled with a #15 scalpel blade.  Given the location of the defect and the proximity to free margins a bi-rhombic flap was deemed most appropriate.  Using a sterile surgical marker, an appropriate rhombic flap was drawn incorporating the defect. The area thus outlined was incised deep to adipose tissue with a #15 scalpel blade.  The skin margins were undermined to an appropriate distance in all directions utilizing iris scissors.
Helical Rim Advancement Flap Text: The defect edges were debeveled with a #15 blade scalpel.  Given the location of the defect and the proximity to free margins (helical rim) a double helical rim advancement flap was deemed most appropriate.  Using a sterile surgical marker, the appropriate advancement flaps were drawn incorporating the defect and placing the expected incisions between the helical rim and antihelix where possible.  The area thus outlined was incised through and through with a #15 scalpel blade.  With a skin hook and iris scissors, the flaps were gently and sharply undermined and freed up.
Bilateral Helical Rim Advancement Flap Text: The defect edges were debeveled with a #15 blade scalpel.  Given the location of the defect and the proximity to free margins (helical rim) a bilateral helical rim advancement flap was deemed most appropriate.  Using a sterile surgical marker, the appropriate advancement flaps were drawn incorporating the defect and placing the expected incisions between the helical rim and antihelix where possible.  The area thus outlined was incised through and through with a #15 scalpel blade.  With a skin hook and iris scissors, the flaps were gently and sharply undermined and freed up.
Ear Star Wedge Flap Text: The defect edges were debeveled with a #15 blade scalpel.  Given the location of the defect and the proximity to free margins (helical rim) an ear star wedge flap was deemed most appropriate.  Using a sterile surgical marker, the appropriate flap was drawn incorporating the defect and placing the expected incisions between the helical rim and antihelix where possible.  The area thus outlined was incised through and through with a #15 scalpel blade.
Banner Transposition Flap Text: The defect edges were debeveled with a #15 scalpel blade.  Given the location of the defect and the proximity to free margins a Banner transposition flap was deemed most appropriate.  Using a sterile surgical marker, an appropriate flap drawn around the defect. The area thus outlined was incised deep to adipose tissue with a #15 scalpel blade.  The skin margins were undermined to an appropriate distance in all directions utilizing iris scissors.
Bilobed Flap Text: The defect edges were debeveled with a #15 scalpel blade.  Given the location of the defect and the proximity to free margins a bilobe flap was deemed most appropriate.  Using a sterile surgical marker, an appropriate bilobe flap drawn around the defect.    The area thus outlined was incised deep to adipose tissue with a #15 scalpel blade.  The skin margins were undermined to an appropriate distance in all directions utilizing iris scissors.
Bilobed Transposition Flap Text: The defect edges were debeveled with a #15 scalpel blade.  Given the location of the defect and the proximity to free margins a bilobed transposition flap was deemed most appropriate.  Using a sterile surgical marker, an appropriate bilobe flap drawn around the defect.    The area thus outlined was incised deep to adipose tissue with a #15 scalpel blade.  The skin margins were undermined to an appropriate distance in all directions utilizing iris scissors.
Trilobed Flap Text: The defect edges were debeveled with a #15 scalpel blade.  Given the location of the defect and the proximity to free margins a trilobed flap was deemed most appropriate.  Using a sterile surgical marker, an appropriate trilobed flap drawn around the defect.    The area thus outlined was incised deep to adipose tissue with a #15 scalpel blade.  The skin margins were undermined to an appropriate distance in all directions utilizing iris scissors.
Dorsal Nasal Flap Text: The defect edges were debeveled with a #15 scalpel blade.  Given the location of the defect and the proximity to free margins a dorsal nasal flap was deemed most appropriate.  Using a sterile surgical marker, an appropriate dorsal nasal flap was drawn around the defect.    The area thus outlined was incised deep to adipose tissue with a #15 scalpel blade.  The skin margins were undermined to an appropriate distance in all directions utilizing iris scissors.
Island Pedicle Flap Text: The defect edges were debeveled with a #15 scalpel blade.  Given the location of the defect, shape of the defect and the proximity to free margins an island pedicle advancement flap was deemed most appropriate.  Using a sterile surgical marker, an appropriate advancement flap was drawn incorporating the defect, outlining the appropriate donor tissue and placing the expected incisions within the relaxed skin tension lines where possible.    The area thus outlined was incised deep to adipose tissue with a #15 scalpel blade.  The skin margins were undermined to an appropriate distance in all directions around the primary defect and laterally outward around the island pedicle utilizing iris scissors.  There was minimal undermining beneath the pedicle flap.
Island Pedicle Flap With Canthal Suspension Text: The defect edges were debeveled with a #15 scalpel blade.  Given the location of the defect, shape of the defect and the proximity to free margins an island pedicle advancement flap was deemed most appropriate.  Using a sterile surgical marker, an appropriate advancement flap was drawn incorporating the defect, outlining the appropriate donor tissue and placing the expected incisions within the relaxed skin tension lines where possible. The area thus outlined was incised deep to adipose tissue with a #15 scalpel blade.  The skin margins were undermined to an appropriate distance in all directions around the primary defect and laterally outward around the island pedicle utilizing iris scissors.  There was minimal undermining beneath the pedicle flap. A suspension suture was placed in the canthal tendon to prevent tension and prevent ectropion.
Alar Island Pedicle Flap Text: The defect edges were debeveled with a #15 scalpel blade.  Given the location of the defect, shape of the defect and the proximity to the alar rim an island pedicle advancement flap was deemed most appropriate.  Using a sterile surgical marker, an appropriate advancement flap was drawn incorporating the defect, outlining the appropriate donor tissue and placing the expected incisions within the nasal ala running parallel to the alar rim. The area thus outlined was incised with a #15 scalpel blade.  The skin margins were undermined minimally to an appropriate distance in all directions around the primary defect and laterally outward around the island pedicle utilizing iris scissors.  There was minimal undermining beneath the pedicle flap.
Double Island Pedicle Flap Text: The defect edges were debeveled with a #15 scalpel blade.  Given the location of the defect, shape of the defect and the proximity to free margins a double island pedicle advancement flap was deemed most appropriate.  Using a sterile surgical marker, an appropriate advancement flap was drawn incorporating the defect, outlining the appropriate donor tissue and placing the expected incisions within the relaxed skin tension lines where possible.    The area thus outlined was incised deep to adipose tissue with a #15 scalpel blade.  The skin margins were undermined to an appropriate distance in all directions around the primary defect and laterally outward around the island pedicle utilizing iris scissors.  There was minimal undermining beneath the pedicle flap.
Island Pedicle Flap-Requiring Vessel Identification Text: The defect edges were debeveled with a #15 scalpel blade.  Given the location of the defect, shape of the defect and the proximity to free margins an island pedicle advancement flap was deemed most appropriate.  Using a sterile surgical marker, an appropriate advancement flap was drawn, based on the axial vessel mentioned above, incorporating the defect, outlining the appropriate donor tissue and placing the expected incisions within the relaxed skin tension lines where possible.    The area thus outlined was incised deep to adipose tissue with a #15 scalpel blade.  The skin margins were undermined to an appropriate distance in all directions around the primary defect and laterally outward around the island pedicle utilizing iris scissors.  There was minimal undermining beneath the pedicle flap.
Keystone Flap Text: The defect edges were debeveled with a #15 scalpel blade.  Given the location of the defect, shape of the defect a keystone flap was deemed most appropriate.  Using a sterile surgical marker, an appropriate keystone flap was drawn incorporating the defect, outlining the appropriate donor tissue and placing the expected incisions within the relaxed skin tension lines where possible. The area thus outlined was incised deep to adipose tissue with a #15 scalpel blade.  The skin margins were undermined to an appropriate distance in all directions around the primary defect and laterally outward around the flap utilizing iris scissors.
O-T Plasty Text: The defect edges were debeveled with a #15 scalpel blade.  Given the location of the defect, shape of the defect and the proximity to free margins an O-T plasty was deemed most appropriate.  Using a sterile surgical marker, an appropriate O-T plasty was drawn incorporating the defect and placing the expected incisions within the relaxed skin tension lines where possible.    The area thus outlined was incised deep to adipose tissue with a #15 scalpel blade.  The skin margins were undermined to an appropriate distance in all directions utilizing iris scissors.
O-Z Plasty Text: The defect edges were debeveled with a #15 scalpel blade.  Given the location of the defect, shape of the defect and the proximity to free margins an O-Z plasty (double transposition flap) was deemed most appropriate.  Using a sterile surgical marker, the appropriate transposition flaps were drawn incorporating the defect and placing the expected incisions within the relaxed skin tension lines where possible.    The area thus outlined was incised deep to adipose tissue with a #15 scalpel blade.  The skin margins were undermined to an appropriate distance in all directions utilizing iris scissors.  Hemostasis was achieved with electrocautery.  The flaps were then transposed into place, one clockwise and the other counterclockwise, and anchored with interrupted buried subcutaneous sutures.
Double O-Z Plasty Text: The defect edges were debeveled with a #15 scalpel blade.  Given the location of the defect, shape of the defect and the proximity to free margins a Double O-Z plasty (double transposition flap) was deemed most appropriate.  Using a sterile surgical marker, the appropriate transposition flaps were drawn incorporating the defect and placing the expected incisions within the relaxed skin tension lines where possible. The area thus outlined was incised deep to adipose tissue with a #15 scalpel blade.  The skin margins were undermined to an appropriate distance in all directions utilizing iris scissors.  Hemostasis was achieved with electrocautery.  The flaps were then transposed into place, one clockwise and the other counterclockwise, and anchored with interrupted buried subcutaneous sutures.
V-Y Plasty Text: The defect edges were debeveled with a #15 scalpel blade.  Given the location of the defect, shape of the defect and the proximity to free margins an V-Y advancement flap was deemed most appropriate.  Using a sterile surgical marker, an appropriate advancement flap was drawn incorporating the defect and placing the expected incisions within the relaxed skin tension lines where possible.    The area thus outlined was incised deep to adipose tissue with a #15 scalpel blade.  The skin margins were undermined to an appropriate distance in all directions utilizing iris scissors.
H Plasty Text: Given the location of the defect, shape of the defect and the proximity to free margins a H-plasty was deemed most appropriate for repair.  Using a sterile surgical marker, the appropriate advancement arms of the H-plasty were drawn incorporating the defect and placing the expected incisions within the relaxed skin tension lines where possible. The area thus outlined was incised deep to adipose tissue with a #15 scalpel blade. The skin margins were undermined to an appropriate distance in all directions utilizing iris scissors.  The opposing advancement arms were then advanced into place in opposite direction and anchored with interrupted buried subcutaneous sutures.
W Plasty Text: The lesion was extirpated to the level of the fat with a #15 scalpel blade.  Given the location of the defect, shape of the defect and the proximity to free margins a W-plasty was deemed most appropriate for repair.  Using a sterile surgical marker, the appropriate transposition arms of the W-plasty were drawn incorporating the defect and placing the expected incisions within the relaxed skin tension lines where possible.    The area thus outlined was incised deep to adipose tissue with a #15 scalpel blade.  The skin margins were undermined to an appropriate distance in all directions utilizing iris scissors.  The opposing transposition arms were then transposed into place in opposite direction and anchored with interrupted buried subcutaneous sutures.
Z Plasty Text: The lesion was extirpated to the level of the fat with a #15 scalpel blade.  Given the location of the defect, shape of the defect and the proximity to free margins a Z-plasty was deemed most appropriate for repair.  Using a sterile surgical marker, the appropriate transposition arms of the Z-plasty were drawn incorporating the defect and placing the expected incisions within the relaxed skin tension lines where possible.    The area thus outlined was incised deep to adipose tissue with a #15 scalpel blade.  The skin margins were undermined to an appropriate distance in all directions utilizing iris scissors.  The opposing transposition arms were then transposed into place in opposite direction and anchored with interrupted buried subcutaneous sutures.
Zygomaticofacial Flap Text: Given the location of the defect, shape of the defect and the proximity to free margins a zygomaticofacial flap was deemed most appropriate for repair.  Using a sterile surgical marker, the appropriate flap was drawn incorporating the defect and placing the expected incisions within the relaxed skin tension lines where possible. The area thus outlined was incised deep to adipose tissue with a #15 scalpel blade with preservation of a vascular pedicle.  The skin margins were undermined to an appropriate distance in all directions utilizing iris scissors.  The flap was then placed into the defect and anchored with interrupted buried subcutaneous sutures.
Cheek Interpolation Flap Text: A decision was made to reconstruct the defect utilizing an interpolation axial flap and a staged reconstruction.  A telfa template was made of the defect.  This telfa template was then used to outline the Cheek Interpolation flap.  The donor area for the pedicle flap was then injected with anesthesia.  The flap was excised through the skin and subcutaneous tissue down to the layer of the underlying musculature.  The interpolation flap was carefully excised within this deep plane to maintain its blood supply.  The edges of the donor site were undermined.   The donor site was closed in a primary fashion.  The pedicle was then rotated into position and sutured.  Once the tube was sutured into place, adequate blood supply was confirmed with blanching and refill.  The pedicle was then wrapped with xeroform gauze and dressed appropriately with a telfa and gauze bandage to ensure continued blood supply and protect the attached pedicle.
Cheek-To-Nose Interpolation Flap Text: A decision was made to reconstruct the defect utilizing an interpolation axial flap and a staged reconstruction.  A telfa template was made of the defect.  This telfa template was then used to outline the Cheek-To-Nose Interpolation flap.  The donor area for the pedicle flap was then injected with anesthesia.  The flap was excised through the skin and subcutaneous tissue down to the layer of the underlying musculature.  The interpolation flap was carefully excised within this deep plane to maintain its blood supply.  The edges of the donor site were undermined.   The donor site was closed in a primary fashion.  The pedicle was then rotated into position and sutured.  Once the tube was sutured into place, adequate blood supply was confirmed with blanching and refill.  The pedicle was then wrapped with xeroform gauze and dressed appropriately with a telfa and gauze bandage to ensure continued blood supply and protect the attached pedicle.
Interpolation Flap Text: A decision was made to reconstruct the defect utilizing an interpolation axial flap and a staged reconstruction.  A telfa template was made of the defect.  This telfa template was then used to outline the interpolation flap.  The donor area for the pedicle flap was then injected with anesthesia.  The flap was excised through the skin and subcutaneous tissue down to the layer of the underlying musculature.  The interpolation flap was carefully excised within this deep plane to maintain its blood supply.  The edges of the donor site were undermined.   The donor site was closed in a primary fashion.  The pedicle was then rotated into position and sutured.  Once the tube was sutured into place, adequate blood supply was confirmed with blanching and refill.  The pedicle was then wrapped with xeroform gauze and dressed appropriately with a telfa and gauze bandage to ensure continued blood supply and protect the attached pedicle.
Melolabial Interpolation Flap Text: A decision was made to reconstruct the defect utilizing an interpolation axial flap and a staged reconstruction.  A telfa template was made of the defect.  This telfa template was then used to outline the melolabial interpolation flap.  The donor area for the pedicle flap was then injected with anesthesia.  The flap was excised through the skin and subcutaneous tissue down to the layer of the underlying musculature.  The pedicle flap was carefully excised within this deep plane to maintain its blood supply.  The edges of the donor site were undermined.   The donor site was closed in a primary fashion.  The pedicle was then rotated into position and sutured.  Once the tube was sutured into place, adequate blood supply was confirmed with blanching and refill.  The pedicle was then wrapped with xeroform gauze and dressed appropriately with a telfa and gauze bandage to ensure continued blood supply and protect the attached pedicle.
Mastoid Interpolation Flap Text: A decision was made to reconstruct the defect utilizing an interpolation axial flap and a staged reconstruction.  A telfa template was made of the defect.  This telfa template was then used to outline the mastoid interpolation flap.  The donor area for the pedicle flap was then injected with anesthesia.  The flap was excised through the skin and subcutaneous tissue down to the layer of the underlying musculature.  The pedicle flap was carefully excised within this deep plane to maintain its blood supply.  The edges of the donor site were undermined.   The donor site was closed in a primary fashion.  The pedicle was then rotated into position and sutured.  Once the tube was sutured into place, adequate blood supply was confirmed with blanching and refill.  The pedicle was then wrapped with xeroform gauze and dressed appropriately with a telfa and gauze bandage to ensure continued blood supply and protect the attached pedicle.
Posterior Auricular Interpolation Flap Text: A decision was made to reconstruct the defect utilizing an interpolation axial flap and a staged reconstruction.  A telfa template was made of the defect.  This telfa template was then used to outline the posterior auricular interpolation flap.  The donor area for the pedicle flap was then injected with anesthesia.  The flap was excised through the skin and subcutaneous tissue down to the layer of the underlying musculature.  The pedicle flap was carefully excised within this deep plane to maintain its blood supply.  The edges of the donor site were undermined.   The donor site was closed in a primary fashion.  The pedicle was then rotated into position and sutured.  Once the tube was sutured into place, adequate blood supply was confirmed with blanching and refill.  The pedicle was then wrapped with xeroform gauze and dressed appropriately with a telfa and gauze bandage to ensure continued blood supply and protect the attached pedicle.
Paramedian Forehead Flap Text: A decision was made to reconstruct the defect utilizing an interpolation axial flap and a staged reconstruction.  A telfa template was made of the defect.  This telfa template was then used to outline the paramedian forehead pedicle flap.  The donor area for the pedicle flap was then injected with anesthesia.  The flap was excised through the skin and subcutaneous tissue down to the layer of the underlying musculature.  The pedicle flap was carefully excised within this deep plane to maintain its blood supply.  The edges of the donor site were undermined.   The donor site was closed in a primary fashion.  The pedicle was then rotated into position and sutured.  Once the tube was sutured into place, adequate blood supply was confirmed with blanching and refill.  The pedicle was then wrapped with xeroform gauze and dressed appropriately with a telfa and gauze bandage to ensure continued blood supply and protect the attached pedicle.
Abbe Flap (Upper To Lower Lip) Text: The defect of the lower lip was assessed and measured.  Given the location and size of the defect, an Abbe flap was deemed most appropriate.  Using a sterile surgical marker, an appropriate Abbe flap was measured and drawn on the upper lip. Local anesthesia was then infiltrated.  A scalpel was then used to incise the upper lip through and through the skin, vermilion, muscle and mucosa, leaving the flap pedicled on the opposite side.  The flap was then rotated and transferred to the lower lip defect.  The flap was then sutured into place with a three layer technique, closing the orbicularis oris muscle layer with subcutaneous buried sutures, followed by a mucosal layer and an epidermal layer.
Abbe Flap (Lower To Upper Lip) Text: The defect of the upper lip was assessed and measured.  Given the location and size of the defect, an Abbe flap was deemed most appropriate.  Using a sterile surgical marker, an appropriate Abbe flap was measured and drawn on the lower lip. Local anesthesia was then infiltrated. A scalpel was then used to incise the upper lip through and through the skin, vermilion, muscle and mucosa, leaving the flap pedicled on the opposite side.  The flap was then rotated and transferred to the lower lip defect.  The flap was then sutured into place with a three layer technique, closing the orbicularis oris muscle layer with subcutaneous buried sutures, followed by a mucosal layer and an epidermal layer.
Estlander Flap (Upper To Lower Lip) Text: The defect of the lower lip was assessed and measured.  Given the location and size of the defect, an Estlander flap was deemed most appropriate.  Using a sterile surgical marker, an appropriate Estlander flap was measured and drawn on the upper lip. Local anesthesia was then infiltrated. A scalpel was then used to incise the lateral aspect of the flap, through skin, muscle and mucosa, leaving the flap pedicled medially.  The flap was then rotated and positioned to fill the lower lip defect.  The flap was then sutured into place with a three layer technique, closing the orbicularis oris muscle layer with subcutaneous buried sutures, followed by a mucosal layer and an epidermal layer.
Cheiloplasty (Less Than 50%) Text: A decision was made to reconstruct the defect with a  cheiloplasty.  The defect was undermined extensively.  Additional obicularis oris muscle was excised with a 15 blade scalpel.  The defect was converted into a full thickness wedge, of less than 50% of the vertical height of the lip, to facilite a better cosmetic result.  Small vessels were then tied off with 5-0 monocyrl. The obicularis oris, superficial fascia, adipose and dermis were then reapproximated.  After the deeper layers were approximated the epidermis was reapproximated with particular care given to realign the vermilion border.
Cheiloplasty (Complex) Text: A decision was made to reconstruct the defect with a  cheiloplasty.  The defect was undermined extensively.  Additional obicularis oris muscle was excised with a 15 blade scalpel.  The defect was converted into a full thickness wedge to facilite a better cosmetic result.  Small vessels were then tied off with 5-0 monocyrl. The obicularis oris, superficial fascia, adipose and dermis were then reapproximated.  After the deeper layers were approximated the epidermis was reapproximated with particular care given to realign the vermilion border.
Ear Wedge Repair Text: A wedge excision was completed by carrying down an excision through the full thickness of the ear and cartilage with an inward facing Burow's triangle. The wound was then closed in a layered fashion.
Full Thickness Lip Wedge Repair (Flap) Text: Given the location of the defect and the proximity to free margins a full thickness wedge repair was deemed most appropriate.  Using a sterile surgical marker, the appropriate repair was drawn incorporating the defect and placing the expected incisions perpendicular to the vermilion border.  The vermilion border was also meticulously outlined to ensure appropriate reapproximation during the repair.  The area thus outlined was incised through and through with a #15 scalpel blade.  The muscularis and dermis were reaproximated with deep sutures following hemostasis. Care was taken to realign the vermilion border before proceeding with the superficial closure.  Once the vermilion was realigned the superfical and mucosal closure was finished.
Ftsg Text: The defect edges were debeveled with a #15 scalpel blade.  Given the location of the defect, shape of the defect and the proximity to free margins a full thickness skin graft was deemed most appropriate.  Using a sterile surgical marker, the primary defect shape was transferred to the donor site. The area thus outlined was incised deep to adipose tissue with a #15 scalpel blade.  The harvested graft was then trimmed of adipose tissue until only dermis and epidermis was left.  The skin margins of the secondary defect were undermined to an appropriate distance in all directions utilizing iris scissors.  The secondary defect was closed with interrupted buried subcutaneous sutures.  The skin edges were then re-apposed with running  sutures.  The skin graft was then placed in the primary defect and oriented appropriately.
Split-Thickness Skin Graft Text: The defect edges were debeveled with a #15 scalpel blade.  Given the location of the defect, shape of the defect and the proximity to free margins a split thickness skin graft was deemed most appropriate.  Using a sterile surgical marker, the primary defect shape was transferred to the donor site. The split thickness graft was then harvested.  The skin graft was then placed in the primary defect and oriented appropriately.
Burow's Graft Text: The defect edges were debeveled with a #15 scalpel blade.  Given the location of the defect, shape of the defect, the proximity to free margins and the presence of a standing cone deformity a Burow's skin graft was deemed most appropriate. The standing cone was removed and this tissue was then trimmed to the shape of the primary defect. The adipose tissue was also removed until only dermis and epidermis were left.  The skin margins of the secondary defect were undermined to an appropriate distance in all directions utilizing iris scissors.  The secondary defect was closed with interrupted buried subcutaneous sutures.  The skin edges were then re-apposed with running  sutures.  The skin graft was then placed in the primary defect and oriented appropriately.
Cartilage Graft Text: The defect edges were debeveled with a #15 scalpel blade.  Given the location of the defect, shape of the defect, the fact the defect involved a full thickness cartilage defect a cartilage graft was deemed most appropriate.  An appropriate donor site was identified, cleansed, and anesthetized. The cartilage graft was then harvested and transferred to the recipient site, oriented appropriately and then sutured into place.  The secondary defect was then repaired using a primary closure.
Composite Graft Text: The defect edges were debeveled with a #15 scalpel blade.  Given the location of the defect, shape of the defect, the proximity to free margins and the fact the defect was full thickness a composite graft was deemed most appropriate.  The defect was outline and then transferred to the donor site.  A full thickness graft was then excised from the donor site. The graft was then placed in the primary defect, oriented appropriately and then sutured into place.  The secondary defect was then repaired using a primary closure.
Epidermal Autograft Text: The defect edges were debeveled with a #15 scalpel blade.  Given the location of the defect, shape of the defect and the proximity to free margins an epidermal autograft was deemed most appropriate.  Using a sterile surgical marker, the primary defect shape was transferred to the donor site. The epidermal graft was then harvested.  The skin graft was then placed in the primary defect and oriented appropriately.
Dermal Autograft Text: The defect edges were debeveled with a #15 scalpel blade.  Given the location of the defect, shape of the defect and the proximity to free margins a dermal autograft was deemed most appropriate.  Using a sterile surgical marker, the primary defect shape was transferred to the donor site. The area thus outlined was incised deep to adipose tissue with a #15 scalpel blade.  The harvested graft was then trimmed of adipose and epidermal tissue until only dermis was left.  The skin graft was then placed in the primary defect and oriented appropriately.
Skin Substitute Text: The defect edges were debeveled with a #15 scalpel blade.  Given the location of the defect, shape of the defect and the proximity to free margins a skin substitute graft was deemed most appropriate.  The graft material was trimmed to fit the size of the defect. The graft was then placed in the primary defect and oriented appropriately.
Tissue Cultured Epidermal Autograft Text: The defect edges were debeveled with a #15 scalpel blade.  Given the location of the defect, shape of the defect and the proximity to free margins a tissue cultured epidermal autograft was deemed most appropriate.  The graft was then trimmed to fit the size of the defect.  The graft was then placed in the primary defect and oriented appropriately.
Xenograft Text: The defect edges were debeveled with a #15 scalpel blade.  Given the location of the defect, shape of the defect and the proximity to free margins a xenograft was deemed most appropriate.  The graft was then trimmed to fit the size of the defect.  The graft was then placed in the primary defect and oriented appropriately.
Purse String (Simple) Text: Given the location of the defect and the characteristics of the surrounding skin a purse string closure was deemed most appropriate.  Undermining was performed circumfirentially around the surgical defect.  A purse string suture was then placed and tightened.
Purse String (Intermediate) Text: Given the location of the defect and the characteristics of the surrounding skin a purse string intermediate closure was deemed most appropriate.  Undermining was performed circumfirentially around the surgical defect.  A purse string suture was then placed and tightened.
Partial Purse String (Simple) Text: Given the location of the defect and the characteristics of the surrounding skin a simple purse string closure was deemed most appropriate.  Undermining was performed circumfirentially around the surgical defect.  A purse string suture was then placed and tightened. Wound tension only allowed a partial closure of the circular defect.
Partial Purse String (Intermediate) Text: Given the location of the defect and the characteristics of the surrounding skin an intermediate purse string closure was deemed most appropriate.  Undermining was performed circumfirentially around the surgical defect.  A purse string suture was then placed and tightened. Wound tension only allowed a partial closure of the circular defect.
Localized Dermabrasion With Wire Brush Text: The patient was draped in routine manner.  Localized dermabrasion using 3 x 17 mm wire brush was performed in routine manner to papillary dermis. This spot dermabrasion is being performed to complete skin cancer reconstruction. It also will eliminate the other sun damaged precancerous cells that are known to be part of the regional effect of a lifetime's worth of sun exposure. This localized dermabrasion is therapeutic and should not be considered cosmetic in any regard.
Tarsorrhaphy Text: A tarsorrhaphy was performed using Frost sutures.
Complex Repair And Flap Additional Text (Will Appearing After The Standard Complex Repair Text): The complex repair was not sufficient to completely close the primary defect. The remaining additional defect was repaired with the flap mentioned below.
Complex Repair And Graft Additional Text (Will Appearing After The Standard Complex Repair Text): The complex repair was not sufficient to completely close the primary defect. The remaining additional defect was repaired with the graft mentioned below.
Manual Repair Warning Statement: We plan on removing the manually selected variable below in favor of our much easier automatic structured text blocks found in the previous tab. We decided to do this to help make the flow better and give you the full power of structured data. Manual selection is never going to be ideal in our platform and I would encourage you to avoid using manual selection from this point on, especially since I will be sunsetting this feature. It is important that you do one of two things with the customized text below. First, you can save all of the text in a word file so you can have it for future reference. Second, transfer the text to the appropriate area in the Library tab. Lastly, if there is a flap or graft type which we do not have you need to let us know right away so I can add it in before the variable is hidden. No need to panic, we plan to give you roughly 6 months to make the change.
Same Histology In Subsequent Stages Text: The pattern and morphology of the tumor is as described in the first stage.
No Residual Tumor Seen Histology Text: There were no malignant cells seen in the sections examined.
Inflammation Suggestive Of Cancer Camouflage Histology Text: There was a dense lymphocytic infiltrate which prevented adequate histologic evaluation of adjacent structures.
Bcc Histology Text: There were numerous aggregates of basaloid cells.
Bcc Infiltrative Histology Text: There were numerous aggregates of basaloid cells demonstrating an infiltrative pattern.
Mart-1 - Positive Histology Text: MART-1 staining demonstrates areas of higher density and clustering of melanocytes with Pagetoid spread upwards within the epidermis. The surgical margins are positive for tumor cells.
Mart-1 - Negative Histology Text: MART-1 staining demonstrates a normal density and pattern of melanocytes along the dermal-epidermal junction. The surgical margins are negative for tumor cells.
Information: Selecting Yes will display possible errors in your note based on the variables you have selected. This validation is only offered as a suggestion for you. PLEASE NOTE THAT THE VALIDATION TEXT WILL BE REMOVED WHEN YOU FINALIZE YOUR NOTE. IF YOU WANT TO FAX A PRELIMINARY NOTE YOU WILL NEED TO TOGGLE THIS TO 'NO' IF YOU DO NOT WANT IT IN YOUR FAXED NOTE.

## 2022-11-03 NOTE — HPI: PROCEDURE (MOHS)
Has The Growth Been Previously Biopsied?: has been previously biopsied
Additional History: She has a spinal cord stimulator

## 2022-11-04 ENCOUNTER — APPOINTMENT (RX ONLY)
Dept: URBAN - METROPOLITAN AREA CLINIC 22 | Facility: CLINIC | Age: 77
Setting detail: DERMATOLOGY
End: 2022-11-04

## 2022-11-04 DIAGNOSIS — Z48.817 ENCOUNTER FOR SURGICAL AFTERCARE FOLLOWING SURGERY ON THE SKIN AND SUBCUTANEOUS TISSUE: ICD-10-CM

## 2022-11-04 PROCEDURE — ? POST-OP WOUND EVALUATION

## 2022-11-04 PROCEDURE — 99024 POSTOP FOLLOW-UP VISIT: CPT

## 2022-11-04 ASSESSMENT — LOCATION ZONE DERM: LOCATION ZONE: EAR

## 2022-11-04 ASSESSMENT — LOCATION DETAILED DESCRIPTION DERM: LOCATION DETAILED: RIGHT CAVUM CONCHA

## 2022-11-04 ASSESSMENT — LOCATION SIMPLE DESCRIPTION DERM: LOCATION SIMPLE: RIGHT EAR

## 2022-11-10 ENCOUNTER — APPOINTMENT (RX ONLY)
Dept: URBAN - METROPOLITAN AREA CLINIC 22 | Facility: CLINIC | Age: 77
Setting detail: DERMATOLOGY
End: 2022-11-10

## 2022-11-10 DIAGNOSIS — Z48.817 ENCOUNTER FOR SURGICAL AFTERCARE FOLLOWING SURGERY ON THE SKIN AND SUBCUTANEOUS TISSUE: ICD-10-CM

## 2022-11-10 PROCEDURE — 99024 POSTOP FOLLOW-UP VISIT: CPT

## 2022-11-10 PROCEDURE — ? POST-OP WOUND EVALUATION

## 2022-11-10 ASSESSMENT — LOCATION ZONE DERM: LOCATION ZONE: EAR

## 2022-11-10 ASSESSMENT — LOCATION SIMPLE DESCRIPTION DERM: LOCATION SIMPLE: RIGHT EAR

## 2022-11-10 ASSESSMENT — LOCATION DETAILED DESCRIPTION DERM: LOCATION DETAILED: RIGHT CAVUM CONCHA

## 2022-11-10 NOTE — PROCEDURE: POST-OP WOUND EVALUATION
Detail Level: Detailed
Add 03274 Cpt? (Important Note: In 2017 The Use Of 37422 Is Being Tracked By Cms To Determine Future Global Period Reimbursement For Global Periods): yes
Wound Evaluated By (Optional): Aaron Hdz MD
Wound Diameter In Cm(Optional): 0
Wound Crusting?: clean
Wound Discharge?: moderate discharge
Wound Drainage?: serous drainage
Wound Granulation?: early
Patient To Follow-Up With?: our clinic
Follow Up Units (Optional): 2
Follow Up Time Frame (Optional): weeks

## 2022-11-30 ENCOUNTER — APPOINTMENT (RX ONLY)
Dept: URBAN - METROPOLITAN AREA CLINIC 22 | Facility: CLINIC | Age: 77
Setting detail: DERMATOLOGY
End: 2022-11-30

## 2022-11-30 DIAGNOSIS — Z48.817 ENCOUNTER FOR SURGICAL AFTERCARE FOLLOWING SURGERY ON THE SKIN AND SUBCUTANEOUS TISSUE: ICD-10-CM

## 2022-11-30 PROCEDURE — ? POST-OP WOUND EVALUATION

## 2022-11-30 PROCEDURE — 99024 POSTOP FOLLOW-UP VISIT: CPT

## 2022-11-30 ASSESSMENT — LOCATION ZONE DERM: LOCATION ZONE: EAR

## 2022-11-30 ASSESSMENT — LOCATION SIMPLE DESCRIPTION DERM: LOCATION SIMPLE: RIGHT EAR

## 2022-11-30 ASSESSMENT — LOCATION DETAILED DESCRIPTION DERM: LOCATION DETAILED: RIGHT CAVUM CONCHA

## 2022-11-30 NOTE — PROCEDURE: POST-OP WOUND EVALUATION
Detail Level: Detailed
Add 48237 Cpt? (Important Note: In 2017 The Use Of 57539 Is Being Tracked By Cms To Determine Future Global Period Reimbursement For Global Periods): yes
Wound Evaluated By (Optional): Aaron Hdz MD
Wound Diameter In Cm(Optional): 0
Wound Crusting?: clean
Wound Edema?: mild
Wound Color?: pink
Any New Or Residual Neoplasm?: No
Patient To Follow-Up With?: our clinic
Follow Up Units (Optional): 1
Follow Up Time Frame (Optional): months

## 2022-12-28 DIAGNOSIS — R10.13 DYSPEPSIA: ICD-10-CM

## 2023-01-04 RX ORDER — SUCRALFATE 1 G/1
TABLET ORAL
Qty: 90 TABLET | Refills: 0 | Status: SHIPPED
Start: 2023-01-04 | End: 2023-04-12

## 2023-01-06 ENCOUNTER — APPOINTMENT (RX ONLY)
Dept: URBAN - METROPOLITAN AREA CLINIC 22 | Facility: CLINIC | Age: 78
Setting detail: DERMATOLOGY
End: 2023-01-06

## 2023-01-06 DIAGNOSIS — Z48.817 ENCOUNTER FOR SURGICAL AFTERCARE FOLLOWING SURGERY ON THE SKIN AND SUBCUTANEOUS TISSUE: ICD-10-CM

## 2023-01-06 PROCEDURE — 99024 POSTOP FOLLOW-UP VISIT: CPT

## 2023-01-06 PROCEDURE — ? POST-OP WOUND EVALUATION

## 2023-01-06 ASSESSMENT — LOCATION SIMPLE DESCRIPTION DERM: LOCATION SIMPLE: RIGHT EAR

## 2023-01-06 ASSESSMENT — LOCATION ZONE DERM: LOCATION ZONE: EAR

## 2023-01-06 ASSESSMENT — LOCATION DETAILED DESCRIPTION DERM: LOCATION DETAILED: RIGHT CAVUM CONCHA

## 2023-01-06 NOTE — PROCEDURE: POST-OP WOUND EVALUATION
Detail Level: Detailed
Add 63434 Cpt? (Important Note: In 2017 The Use Of 14207 Is Being Tracked By Cms To Determine Future Global Period Reimbursement For Global Periods): yes
Wound Evaluated By (Optional): Aaron Hdz MD
Wound Diameter In Cm(Optional): 0
Wound Crusting?: clean
Wound Color?: pink

## 2023-03-23 NOTE — PROCEDURE: POST-OP WOUND EVALUATION
Detail Level: Detailed
Add 23189 Cpt? (Important Note: In 2017 The Use Of 14445 Is Being Tracked By Cms To Determine Future Global Period Reimbursement For Global Periods): yes
Wound Evaluated By (Optional): Aaron Hdz MD
Wound Diameter In Cm(Optional): 0
Wound Crusting?: clean
Wound Discharge?: moderate discharge
Wound Edema?: moderate
Wound Drainage?: hemorrhagic drainage
Wound Color?: pink
Wound Granulation?: early
Yes

## 2023-04-04 ENCOUNTER — HOSPITAL ENCOUNTER (OUTPATIENT)
Dept: LAB | Facility: MEDICAL CENTER | Age: 78
End: 2023-04-04
Attending: INTERNAL MEDICINE
Payer: MEDICARE

## 2023-04-04 DIAGNOSIS — E55.9 VITAMIN D DEFICIENCY: ICD-10-CM

## 2023-04-04 DIAGNOSIS — R73.01 IFG (IMPAIRED FASTING GLUCOSE): ICD-10-CM

## 2023-04-04 DIAGNOSIS — E78.2 MIXED HYPERLIPIDEMIA: ICD-10-CM

## 2023-04-04 LAB
25(OH)D3 SERPL-MCNC: 45 NG/ML (ref 30–100)
ALBUMIN SERPL BCP-MCNC: 4.6 G/DL (ref 3.2–4.9)
ALBUMIN/GLOB SERPL: 1.8 G/DL
ALP SERPL-CCNC: 63 U/L (ref 30–99)
ALT SERPL-CCNC: 10 U/L (ref 2–50)
ANION GAP SERPL CALC-SCNC: 12 MMOL/L (ref 7–16)
AST SERPL-CCNC: 16 U/L (ref 12–45)
BASOPHILS # BLD AUTO: 0.6 % (ref 0–1.8)
BASOPHILS # BLD: 0.03 K/UL (ref 0–0.12)
BILIRUB SERPL-MCNC: 0.4 MG/DL (ref 0.1–1.5)
BUN SERPL-MCNC: 12 MG/DL (ref 8–22)
CALCIUM ALBUM COR SERPL-MCNC: 9.3 MG/DL (ref 8.5–10.5)
CALCIUM SERPL-MCNC: 9.8 MG/DL (ref 8.5–10.5)
CHLORIDE SERPL-SCNC: 107 MMOL/L (ref 96–112)
CHOLEST SERPL-MCNC: 200 MG/DL (ref 100–199)
CO2 SERPL-SCNC: 24 MMOL/L (ref 20–33)
CREAT SERPL-MCNC: 0.79 MG/DL (ref 0.5–1.4)
EOSINOPHIL # BLD AUTO: 0.18 K/UL (ref 0–0.51)
EOSINOPHIL NFR BLD: 3.6 % (ref 0–6.9)
ERYTHROCYTE [DISTWIDTH] IN BLOOD BY AUTOMATED COUNT: 43.7 FL (ref 35.9–50)
EST. AVERAGE GLUCOSE BLD GHB EST-MCNC: 120 MG/DL
FASTING STATUS PATIENT QL REPORTED: NORMAL
GFR SERPLBLD CREATININE-BSD FMLA CKD-EPI: 76 ML/MIN/1.73 M 2
GLOBULIN SER CALC-MCNC: 2.6 G/DL (ref 1.9–3.5)
GLUCOSE SERPL-MCNC: 82 MG/DL (ref 65–99)
HBA1C MFR BLD: 5.8 % (ref 4–5.6)
HCT VFR BLD AUTO: 49.7 % (ref 37–47)
HDLC SERPL-MCNC: 57 MG/DL
HGB BLD-MCNC: 15.5 G/DL (ref 12–16)
IMM GRANULOCYTES # BLD AUTO: 0.02 K/UL (ref 0–0.11)
IMM GRANULOCYTES NFR BLD AUTO: 0.4 % (ref 0–0.9)
LDLC SERPL CALC-MCNC: 106 MG/DL
LYMPHOCYTES # BLD AUTO: 2.2 K/UL (ref 1–4.8)
LYMPHOCYTES NFR BLD: 43.6 % (ref 22–41)
MCH RBC QN AUTO: 30.1 PG (ref 27–33)
MCHC RBC AUTO-ENTMCNC: 31.2 G/DL (ref 33.6–35)
MCV RBC AUTO: 96.5 FL (ref 81.4–97.8)
MONOCYTES # BLD AUTO: 0.34 K/UL (ref 0–0.85)
MONOCYTES NFR BLD AUTO: 6.7 % (ref 0–13.4)
NEUTROPHILS # BLD AUTO: 2.28 K/UL (ref 2–7.15)
NEUTROPHILS NFR BLD: 45.1 % (ref 44–72)
NRBC # BLD AUTO: 0 K/UL
NRBC BLD-RTO: 0 /100 WBC
PLATELET # BLD AUTO: 346 K/UL (ref 164–446)
PMV BLD AUTO: 10.2 FL (ref 9–12.9)
POTASSIUM SERPL-SCNC: 4 MMOL/L (ref 3.6–5.5)
PROT SERPL-MCNC: 7.2 G/DL (ref 6–8.2)
RBC # BLD AUTO: 5.15 M/UL (ref 4.2–5.4)
SODIUM SERPL-SCNC: 143 MMOL/L (ref 135–145)
TRIGL SERPL-MCNC: 183 MG/DL (ref 0–149)
TSH SERPL DL<=0.005 MIU/L-ACNC: 2.83 UIU/ML (ref 0.38–5.33)
WBC # BLD AUTO: 5.1 K/UL (ref 4.8–10.8)

## 2023-04-04 PROCEDURE — 80061 LIPID PANEL: CPT

## 2023-04-04 PROCEDURE — 84443 ASSAY THYROID STIM HORMONE: CPT

## 2023-04-04 PROCEDURE — 85025 COMPLETE CBC W/AUTO DIFF WBC: CPT

## 2023-04-04 PROCEDURE — 82306 VITAMIN D 25 HYDROXY: CPT

## 2023-04-04 PROCEDURE — 36415 COLL VENOUS BLD VENIPUNCTURE: CPT | Mod: GA

## 2023-04-04 PROCEDURE — 80053 COMPREHEN METABOLIC PANEL: CPT

## 2023-04-04 PROCEDURE — 83036 HEMOGLOBIN GLYCOSYLATED A1C: CPT | Mod: GA

## 2023-04-12 ENCOUNTER — TELEMEDICINE (OUTPATIENT)
Dept: MEDICAL GROUP | Age: 78
End: 2023-04-12
Payer: MEDICARE

## 2023-04-12 VITALS — BODY MASS INDEX: 22.6 KG/M2 | WEIGHT: 144 LBS | RESPIRATION RATE: 12 BRPM | HEIGHT: 67 IN

## 2023-04-12 DIAGNOSIS — R73.01 IFG (IMPAIRED FASTING GLUCOSE): ICD-10-CM

## 2023-04-12 DIAGNOSIS — R10.9 ABDOMINAL CRAMPS: ICD-10-CM

## 2023-04-12 DIAGNOSIS — N32.9 BLADDER DISORDER: ICD-10-CM

## 2023-04-12 DIAGNOSIS — E55.9 VITAMIN D DEFICIENCY: ICD-10-CM

## 2023-04-12 DIAGNOSIS — Z11.59 NEED FOR HEPATITIS C SCREENING TEST: ICD-10-CM

## 2023-04-12 DIAGNOSIS — E78.2 MIXED HYPERLIPIDEMIA: ICD-10-CM

## 2023-04-12 DIAGNOSIS — G89.4 CHRONIC PAIN SYNDROME: ICD-10-CM

## 2023-04-12 PROCEDURE — 99214 OFFICE O/P EST MOD 30 MIN: CPT | Mod: 95 | Performed by: INTERNAL MEDICINE

## 2023-04-12 RX ORDER — OMEPRAZOLE 40 MG/1
40 CAPSULE, DELAYED RELEASE ORAL DAILY
Qty: 90 CAPSULE | Refills: 3 | Status: SHIPPED | OUTPATIENT
Start: 2023-04-12 | End: 2023-10-18 | Stop reason: SDUPTHER

## 2023-04-12 RX ORDER — ROSUVASTATIN CALCIUM 10 MG/1
10 TABLET, COATED ORAL EVERY EVENING
Qty: 90 TABLET | Refills: 4 | Status: SHIPPED | OUTPATIENT
Start: 2023-04-12 | End: 2023-10-18 | Stop reason: SDUPTHER

## 2023-04-12 RX ORDER — METHOCARBAMOL 750 MG/1
750 TABLET, FILM COATED ORAL 4 TIMES DAILY
Status: SHIPPED | DISCHARGE
Start: 2023-04-12

## 2023-04-12 RX ORDER — GABAPENTIN 100 MG/1
100 CAPSULE ORAL 4 TIMES DAILY
Qty: 90 CAPSULE | Status: SHIPPED | DISCHARGE
Start: 2023-04-12 | End: 2023-10-18 | Stop reason: SDUPTHER

## 2023-04-12 RX ORDER — FENOFIBRATE 134 MG/1
CAPSULE ORAL
Qty: 90 CAPSULE | Refills: 4 | Status: SHIPPED | OUTPATIENT
Start: 2023-04-12

## 2023-04-12 RX ORDER — HYDROCODONE BITARTRATE AND ACETAMINOPHEN 10; 325 MG/1; MG/1
TABLET ORAL
Qty: 20 TABLET | Status: SHIPPED | DISCHARGE
Start: 2023-04-12 | End: 2023-05-12

## 2023-04-12 RX ORDER — MIRABEGRON 25 MG/1
25 TABLET, FILM COATED, EXTENDED RELEASE ORAL 2 TIMES DAILY
Qty: 180 TABLET | Refills: 4 | Status: SHIPPED | DISCHARGE
Start: 2023-04-12 | End: 2023-10-18 | Stop reason: SDUPTHER

## 2023-04-12 RX ORDER — SULFAMETHOXAZOLE AND TRIMETHOPRIM 800; 160 MG/1; MG/1
TABLET ORAL
Status: SHIPPED | DISCHARGE
Start: 2023-04-12 | End: 2023-10-18

## 2023-04-12 RX ORDER — DICYCLOMINE HYDROCHLORIDE 10 MG/1
CAPSULE ORAL
Qty: 180 CAPSULE | Refills: 4 | Status: SHIPPED | OUTPATIENT
Start: 2023-04-12

## 2023-04-12 ASSESSMENT — PATIENT HEALTH QUESTIONNAIRE - PHQ9: CLINICAL INTERPRETATION OF PHQ2 SCORE: 0

## 2023-04-12 ASSESSMENT — FIBROSIS 4 INDEX: FIB4 SCORE: 1.16

## 2023-04-12 NOTE — PROGRESS NOTES
Virtual Visit: Established Patient   This visit was conducted via Zoom using secure and encrypted videoconferencing technology.   The patient was in their home in the [facility].    The patient's identity was confirmed and verbal consent was obtained for this virtual visit.     Subjective:   CC:   Chief Complaint   Patient presents with    Follow-Up     Lab review medication review        Berenice Mart is a 77 y.o. female presenting for evaluation and management of:     Patient Active Problem List   Diagnosis    Mixed hyperlipidemia    DDD (degenerative disc disease), lumbar    DDD (degenerative disc disease), cervical    Menopausal and postmenopausal disorder    Vitamin D deficiency disease    COPD (chronic obstructive pulmonary disease) (HCC)    Chronic allergic rhinitis    Bladder disorder- O[facility] -dr. willoughby (gyn)    Primary insomnia    Chronic pain syndrome- d/t ddd l/s and cervical spine- pain mgt- rx hc/apap qd and gabapentin    Opiate dependence, continuous (HCC)- pain mgt    Excessive ear wax, left    Mild intermittent asthma without complication     .       ROS        Current medicines (including changes today)  Current Outpatient Medications   Medication Sig Dispense Refill    fenofibrate micronized (LOFIBRA) 134 MG capsule TAKE ONE CAPSULE BY MOUTH EVERY MORNING BEFORE BREAKFAST 90 Capsule 4    rosuvastatin (CRESTOR) 10 MG Tab Take 1 Tablet by mouth every evening. 90 Tablet 4    omeprazole (PRILOSEC) 40 MG delayed-release capsule Take 1 Capsule by mouth every day. 90 Capsule 3    methocarbamol (ROBAXIN) 750 MG Tab Take 1 Tablet by mouth 4 times a day.      HYDROcodone/acetaminophen (NORCO)  MG Tab hydrocodone 10 mg-acetaminophen 325 mg tablet 20 Tablet     sulfamethoxazole-trimethoprim (BACTRIM DS) 800-160 MG tablet sulfamethoxazole 800 mg-trimethoprim 160 mg tablet   TK 1 T PO  BID      dicyclomine (BENTYL) 10 MG Cap TAKE ONE CAPSULE BY MOUTH TWO TIMES A DAY. before breakfast and  "dinner 180 Capsule 4    polyethylene glycol 3350 (MIRALAX) 17 GM/SCOOP Powder polyethylene glycol 3350 17 gram oral powder packet      albuterol (PROAIR HFA) 108 (90 Base) MCG/ACT Aero Soln inhalation aerosol Inhale 2 Puffs every 6 hours as needed for Shortness of Breath. 8.5 g 11    estradiol (CLIMARA) 0.0375 MG/24HR patch estradiol 0.0375 mg/24 hr weekly transdermal patch      gabapentin (NEURONTIN) 100 MG Cap       fluticasone (FLONASE) 50 MCG/ACT nasal spray Spray 2 Sprays in nose every day. 16 g 12    Mirabegron ER (MYRBETRIQ) 25 MG TABLET SR 24 HR Take 1 Each by mouth 2 Times a Day. 180 Tab 4     No current facility-administered medications for this visit.       Patient Active Problem List    Diagnosis Date Noted    Excessive ear wax, left 09/08/2021    Mild intermittent asthma without complication 09/08/2021    Primary insomnia 01/04/2018    Chronic pain syndrome- d/t ddd l/s and cervical spine- pain mgt- rx hc/apap qd and gabapentin 01/04/2018    Opiate dependence, continuous (HCC)- pain mgt 01/04/2018    Bladder disorder- [facility] -dr. willoughby (gyn) 05/20/2015    Chronic allergic rhinitis 04/03/2014    Vitamin D deficiency disease 12/17/2013    COPD (chronic obstructive pulmonary disease) (HCC) 12/17/2013    Mixed hyperlipidemia 12/01/2011    DDD (degenerative disc disease), lumbar 12/01/2011    DDD (degenerative disc disease), cervical 12/01/2011    Menopausal and postmenopausal disorder 12/01/2011        Objective:   Resp 12   Ht 1.689 m (5' 6.5\")   Wt 65.3 kg (144 lb)   LMP 12/01/1976   BMI 22.89 kg/m²     Physical Exam:        Constitutional: Alert, no distress, well-groomed.  Skin: No rashes in visible areas.  Eye: Round. Conjunctiva clear, lids normal. No icterus.   ENMT: Lips pink without lesions, good dentition, moist mucous membranes. Phonation normal.  Neck: No masses, no thyromegaly. Moves freely without pain.  Respiratory: Unlabored respiratory effort, no cough or audible wheeze  Psych: Alert " and oriented x3, normal affect and mood.       Allergies   Allergen Reactions    Codeine     Morphine Hcl Nausea     Dr Chin aware     Hospital Outpatient Visit on 04/04/2023   Component Date Value    Glycohemoglobin 04/04/2023 5.8 (H)     Est Avg Glucose 04/04/2023 120     25-Hydroxy   Vitamin D 25 04/04/2023 45     WBC 04/04/2023 5.1     RBC 04/04/2023 5.15     Hemoglobin 04/04/2023 15.5     Hematocrit 04/04/2023 49.7 (H)     MCV 04/04/2023 96.5     MCH 04/04/2023 30.1     MCHC 04/04/2023 31.2 (L)     RDW 04/04/2023 43.7     Platelet Count 04/04/2023 346     MPV 04/04/2023 10.2     Neutrophils-Polys 04/04/2023 45.10     Lymphocytes 04/04/2023 43.60 (H)     Monocytes 04/04/2023 6.70     Eosinophils 04/04/2023 3.60     Basophils 04/04/2023 0.60     Immature Granulocytes 04/04/2023 0.40     Nucleated RBC 04/04/2023 0.00     Neutrophils (Absolute) 04/04/2023 2.28     Lymphs (Absolute) 04/04/2023 2.20     Monos (Absolute) 04/04/2023 0.34     Eos (Absolute) 04/04/2023 0.18     Baso (Absolute) 04/04/2023 0.03     Immature Granulocytes (a* 04/04/2023 0.02     NRBC (Absolute) 04/04/2023 0.00     Cholesterol,Tot 04/04/2023 200 (H)     Triglycerides 04/04/2023 183 (H)     HDL 04/04/2023 57     LDL 04/04/2023 106 (H)     Sodium 04/04/2023 143     Potassium 04/04/2023 4.0     Chloride 04/04/2023 107     Co2 04/04/2023 24     Anion Gap 04/04/2023 12.0     Glucose 04/04/2023 82     Bun 04/04/2023 12     Creatinine 04/04/2023 0.79     Calcium 04/04/2023 9.8     AST(SGOT) 04/04/2023 16     ALT(SGPT) 04/04/2023 10     Alkaline Phosphatase 04/04/2023 63     Total Bilirubin 04/04/2023 0.4     Albumin 04/04/2023 4.6     Total Protein 04/04/2023 7.2     Globulin 04/04/2023 2.6     A-G Ratio 04/04/2023 1.8     TSH 04/04/2023 2.830     Fasting Status 04/04/2023 Fasting     Correct Calcium 04/04/2023 9.3     GFR (CKD-EPI) 04/04/2023 76       Lab Results   Component Value Date/Time    HBA1C 5.8 (H) 04/04/2023 08:16 AM    HBA1C 5.7 (H)  09/02/2022 11:28 AM     Lab Results   Component Value Date/Time    SODIUM 143 04/04/2023 08:16 AM    POTASSIUM 4.0 04/04/2023 08:16 AM    CHLORIDE 107 04/04/2023 08:16 AM    CO2 24 04/04/2023 08:16 AM    GLUCOSE 82 04/04/2023 08:16 AM    BUN 12 04/04/2023 08:16 AM    CREATININE 0.79 04/04/2023 08:16 AM    CREATININE 0.7 08/17/2006 08:15 AM    ALKPHOSPHAT 63 04/04/2023 08:16 AM    ASTSGOT 16 04/04/2023 08:16 AM    ALTSGPT 10 04/04/2023 08:16 AM    TBILIRUBIN 0.4 04/04/2023 08:16 AM     Lab Results   Component Value Date/Time    INR 1.03 12/07/2020 09:10 AM     Lab Results   Component Value Date/Time    CHOLSTRLTOT 200 (H) 04/04/2023 08:16 AM     (H) 04/04/2023 08:16 AM    HDL 57 04/04/2023 08:16 AM    TRIGLYCERIDE 183 (H) 04/04/2023 08:16 AM       No results found for: TESTOSTERONE  No results found for: TSH  Lab Results   Component Value Date/Time    FREET4 0.80 04/26/2018 12:25 PM    FREET4 0.95 03/24/2014 07:48 AM     Lab Results   Component Value Date/Time    URICACID 3.4 05/14/2012 07:53 AM     No components found for: VITB12  Lab Results   Component Value Date/Time    25HYDROXY 45 04/04/2023 08:16 AM    25HYDROXY 91 09/02/2022 11:28 AM       Assessment and Plan:   The following treatment plan was discussed:     1. Mixed hyperlipidemia-not at goal.  Switch lovastatin to Crestor 10 mg daily and continue with fenofibrate 134 mg daily.  - fenofibrate micronized (LOFIBRA) 134 MG capsule; TAKE ONE CAPSULE BY MOUTH EVERY MORNING BEFORE BREAKFAST  Dispense: 90 Capsule; Refill: 4  - rosuvastatin (CRESTOR) 10 MG Tab; Take 1 Tablet by mouth every evening.  Dispense: 90 Tablet; Refill: 4     - Comp Metabolic Panel; Future  - CBC WITH DIFFERENTIAL; Future  - TSH; Future  - Lipid Profile; Future    2. Chronic pain syndrome- d/t ddd l/s and cervical spine- pain mgt- rx hc/apap qd and gabapentin-under good control managed by pain management.  Continue with current regimen unchanged.  - methocarbamol (ROBAXIN) 750 MG  Tab; Take 1 Tablet by mouth 4 times a day.  - HYDROcodone/acetaminophen (NORCO)  MG Tab; hydrocodone 10 mg-acetaminophen 325 mg tablet  Dispense: 20 Tablet    3. Abdominal cramps under good control.  Follow-up with GI.  Continue Bentyl as needed muscle spasms irritable bowel symptoms and switch sucralfate to omeprazole daily basis for recurrent gastritis.    - omeprazole (PRILOSEC) 40 MG delayed-release capsule; Take 1 Capsule by mouth every day.  Dispense: 90 Capsule; Refill: 3  - dicyclomine (BENTYL) 10 MG Cap; TAKE ONE CAPSULE BY MOUTH TWO TIMES A DAY. before breakfast and dinner  Dispense: 180 Capsule; Refill: 4    4. Vitamin D deficiency good control continue current regimen of vitamin D3 2000 units daily.  - VITAMIN D,25 HYDROXY (DEFICIENCY); Future    5. IFG (impaired fasting glucose)-good control on diet and exercise continue Mediterranean diet.  - HEMOGLOBIN A1C; Future    6. Need for hepatitis C screening test  - HCV Scrn ( [id number] [employer] - Medicare Patients Only); Future    Other orders for recurrent urinary tract infections.  Prescribed by urologist/OB/GYN.  -    - sulfamethoxazole-trimethoprim (BACTRIM DS) 800-160 MG tablet; sulfamethoxazole 800 mg-trimethoprim 160 mg tablet   TK 1 T PO  BID      Follow-up: No follow-ups on file.

## 2023-04-26 ENCOUNTER — APPOINTMENT (RX ONLY)
Dept: URBAN - METROPOLITAN AREA CLINIC 22 | Facility: CLINIC | Age: 78
Setting detail: DERMATOLOGY
End: 2023-04-26

## 2023-04-26 DIAGNOSIS — L82.0 INFLAMED SEBORRHEIC KERATOSIS: ICD-10-CM

## 2023-04-26 DIAGNOSIS — L57.0 ACTINIC KERATOSIS: ICD-10-CM

## 2023-04-26 DIAGNOSIS — Z48.817 ENCOUNTER FOR SURGICAL AFTERCARE FOLLOWING SURGERY ON THE SKIN AND SUBCUTANEOUS TISSUE: ICD-10-CM

## 2023-04-26 PROCEDURE — ? LIQUID NITROGEN

## 2023-04-26 PROCEDURE — 99024 POSTOP FOLLOW-UP VISIT: CPT

## 2023-04-26 PROCEDURE — 17000 DESTRUCT PREMALG LESION: CPT | Mod: 59,79

## 2023-04-26 PROCEDURE — ? POST-OP WOUND EVALUATION

## 2023-04-26 PROCEDURE — 17110 DESTRUCTION B9 LES UP TO 14: CPT | Mod: 79

## 2023-04-26 ASSESSMENT — LOCATION DETAILED DESCRIPTION DERM
LOCATION DETAILED: RIGHT LATERAL DISTAL PRETIBIAL REGION
LOCATION DETAILED: RIGHT CAVUM CONCHA

## 2023-04-26 ASSESSMENT — LOCATION ZONE DERM
LOCATION ZONE: LEG
LOCATION ZONE: EAR

## 2023-04-26 ASSESSMENT — LOCATION SIMPLE DESCRIPTION DERM
LOCATION SIMPLE: RIGHT EAR
LOCATION SIMPLE: RIGHT PRETIBIAL REGION

## 2023-04-26 NOTE — PROCEDURE: POST-OP WOUND EVALUATION
Detail Level: Detailed
Add 02343 Cpt? (Important Note: In 2017 The Use Of 96728 Is Being Tracked By Cms To Determine Future Global Period Reimbursement For Global Periods): yes
Wound Evaluated By (Optional): Aaron Hdz MD
Wound Diameter In Cm(Optional): 0
Wound Crusting?: crusted

## 2023-04-26 NOTE — PROCEDURE: LIQUID NITROGEN
Application Tool (Optional): Liquid Nitrogen Sprayer
Show Applicator Variable?: Yes
Post-Care Instructions: I reviewed with the patient in detail post-care instructions. Patient is to wear sunprotection, and avoid picking at any of the treated lesions. Pt may apply Vaseline to crusted or scabbing areas.
Duration Of Freeze Thaw-Cycle (Seconds): 5
Detail Level: Detailed
Render Note In Bullet Format When Appropriate: No
Number Of Freeze-Thaw Cycles: 1 freeze-thaw cycle
Consent: The patient's consent was obtained including but not limited to risks of crusting, scabbing, blistering, scarring, darker or lighter pigmentary change, recurrence, incomplete removal and infection.
Spray Paint Text: The liquid nitrogen was applied to the skin utilizing a spray paint frosting technique.
Medical Necessity Information: It is in your best interest to select a reason for this procedure from the list below. All of these items fulfill various CMS LCD requirements except the new and changing color options.
Duration Of Freeze Thaw-Cycle (Seconds): 5-10
Medical Necessity Clause: This procedure was medically necessary because the lesions that were treated were:
Pared With?: 15 blade

## 2023-05-16 ENCOUNTER — APPOINTMENT (RX ONLY)
Dept: URBAN - METROPOLITAN AREA CLINIC 22 | Facility: CLINIC | Age: 78
Setting detail: DERMATOLOGY
End: 2023-05-16

## 2023-05-16 PROBLEM — D48.5 NEOPLASM OF UNCERTAIN BEHAVIOR OF SKIN: Status: ACTIVE | Noted: 2023-05-16

## 2023-05-16 PROCEDURE — 69105 BIOPSY OF EXTERNAL EAR CANAL: CPT

## 2023-05-16 PROCEDURE — ? BIOPSY BY SHAVE METHOD

## 2023-05-16 NOTE — PROCEDURE: BIOPSY BY SHAVE METHOD
Detail Level: Detailed
Depth Of Biopsy: dermis
Was A Bandage Applied: Yes
Size Of Lesion In Cm: 0.5
X Size Of Lesion In Cm: 0.4
Biopsy Type: H and E
Biopsy Method: Personna blade
Anesthesia Type: 1% lidocaine with epinephrine and a 1:10 solution of 8.4% sodium bicarbonate
Anesthesia Volume In Cc: 1
Additional Anesthesia Volume In Cc (Will Not Render If 0): 0
Hemostasis: Electricator
Wound Care: Petrolatum
Dressing: Band-Aid
Destruction After The Procedure: No
Type Of Destruction Used: Curettage
Curettage Text: The wound bed was treated with curettage after the biopsy was performed.
Cryotherapy Text: The wound bed was treated with cryotherapy after the biopsy was performed.
Electrodesiccation Text: The wound bed was treated with electrodesiccation after the biopsy was performed.
Electrodesiccation And Curettage Text: The wound bed was treated with electrodesiccation and curettage after the biopsy was performed.
Silver Nitrate Text: The wound bed was treated with silver nitrate after the biopsy was performed.
Lab: [id number]
Lab Facility: 
Consent: Written consent was obtained and risks were reviewed including but not limited to scarring, infection, bleeding, scabbing, incomplete removal, nerve damage and allergy to anesthesia.
Post-Care Instructions: I reviewed with the patient in detail post-care instructions. Patient is to keep the biopsy site dry overnight, and then apply bacitracin twice daily until healed. Patient may apply hydrogen peroxide soaks to remove any crusting.
Notification Instructions: Patient will be notified of biopsy results. However, patient instructed to call the office if not contacted within 2 weeks.
Billing Type: Third-Party Bill
Information: Selecting Yes will display possible errors in your note based on the variables you have selected. This validation is only offered as a suggestion for you. PLEASE NOTE THAT THE VALIDATION TEXT WILL BE REMOVED WHEN YOU FINALIZE YOUR NOTE. IF YOU WANT TO FAX A PRELIMINARY NOTE YOU WILL NEED TO TOGGLE THIS TO 'NO' IF YOU DO NOT WANT IT IN YOUR FAXED NOTE.

## 2023-08-17 ENCOUNTER — HOSPITAL ENCOUNTER (OUTPATIENT)
Facility: MEDICAL CENTER | Age: 78
End: 2023-08-17
Attending: STUDENT IN AN ORGANIZED HEALTH CARE EDUCATION/TRAINING PROGRAM
Payer: MEDICARE

## 2023-08-17 PROCEDURE — 87077 CULTURE AEROBIC IDENTIFY: CPT | Mod: 91

## 2023-08-17 PROCEDURE — 87086 URINE CULTURE/COLONY COUNT: CPT

## 2023-08-17 PROCEDURE — 87186 SC STD MICRODIL/AGAR DIL: CPT | Mod: 91

## 2023-08-21 LAB
BACTERIA UR CULT: ABNORMAL
SIGNIFICANT IND 70042: ABNORMAL
SITE SITE: ABNORMAL
SOURCE SOURCE: ABNORMAL

## 2023-08-28 ENCOUNTER — OFFICE VISIT (OUTPATIENT)
Dept: URGENT CARE | Facility: PHYSICIAN GROUP | Age: 78
End: 2023-08-28
Payer: MEDICARE

## 2023-08-28 ENCOUNTER — APPOINTMENT (OUTPATIENT)
Dept: URGENT CARE | Facility: PHYSICIAN GROUP | Age: 78
End: 2023-08-28
Payer: MEDICARE

## 2023-08-28 VITALS
OXYGEN SATURATION: 95 % | WEIGHT: 144 LBS | RESPIRATION RATE: 16 BRPM | TEMPERATURE: 98.1 F | HEART RATE: 95 BPM | BODY MASS INDEX: 22.89 KG/M2 | SYSTOLIC BLOOD PRESSURE: 128 MMHG | DIASTOLIC BLOOD PRESSURE: 64 MMHG

## 2023-08-28 DIAGNOSIS — R11.0 NAUSEA: ICD-10-CM

## 2023-08-28 DIAGNOSIS — R63.8 DEHYDRATION SYMPTOMS: ICD-10-CM

## 2023-08-28 DIAGNOSIS — R19.7 DIARRHEA, UNSPECIFIED TYPE: ICD-10-CM

## 2023-08-28 PROCEDURE — 3078F DIAST BP <80 MM HG: CPT

## 2023-08-28 PROCEDURE — 3074F SYST BP LT 130 MM HG: CPT

## 2023-08-28 PROCEDURE — 99215 OFFICE O/P EST HI 40 MIN: CPT

## 2023-08-28 RX ORDER — HYDROCODONE BITARTRATE AND ACETAMINOPHEN 10; 325 MG/1; MG/1
TABLET ORAL
COMMUNITY

## 2023-08-28 ASSESSMENT — FIBROSIS 4 INDEX: FIB4 SCORE: 1.17

## 2023-08-28 NOTE — LETTER
August 28, 2023    To Whom It May Concern:         This is confirmation that Berenice Mart attended her scheduled appointment today.     She is encouraged to attend the ER for evaluation of severe diarrhea, dehydration, and abdominal pain. She reports treatment for urinary tract infection prior to the onset of her symptoms.         Sincerely,        Mary Tobar A.P.R.N.  [phone]

## 2023-08-28 NOTE — PROGRESS NOTES
Subjective:   Berenice Mart is a 78 y.o. female who presents for Diarrhea (X 4 days and is unable to keep any food or fluids down and been having a headache.)      HPI:    Patient presents to urgent care with concerns of severe diarrhea, trouble keeping fluids and food down.   She reports symptoms started 4 days ago.   States she feels dehydrated and is having some dizziness.  Is experiencing nausea, but denies emesis.   States she ate lunch with her family prior to the onset of her symptoms. Had a chicken salad. No one else in her household or immediate family has been sick.   She also states she has been on antibiotics for recurrent UTIs.  States stool is liquid, brown, and watery  Denies fever or chills  States food and liquids move through her abdomen quickly.         ROS As above in HPI    Medications:    Current Outpatient Medications on File Prior to Visit   Medication Sig Dispense Refill    HYDROcodone/acetaminophen (NORCO)  MG Tab TK 1 T PO Q 4-6 H PRN .DNFU 07/23      zolpidem (AMBIEN) 10 MG Tab Take 1 Tablet by mouth at bedtime as needed for Sleep for up to 90 days. 90 Tablet 0    fenofibrate micronized (LOFIBRA) 134 MG capsule TAKE ONE CAPSULE BY MOUTH EVERY MORNING BEFORE BREAKFAST 90 Capsule 4    rosuvastatin (CRESTOR) 10 MG Tab Take 1 Tablet by mouth every evening. 90 Tablet 4    omeprazole (PRILOSEC) 40 MG delayed-release capsule Take 1 Capsule by mouth every day. 90 Capsule 3    methocarbamol (ROBAXIN) 750 MG Tab Take 1 Tablet by mouth 4 times a day.      dicyclomine (BENTYL) 10 MG Cap TAKE ONE CAPSULE BY MOUTH TWO TIMES A DAY. before breakfast and dinner 180 Capsule 4    mirabegron ER (MYRBETRIQ) 25 MG TABLET SR 24 HR Take 1 Tablet by mouth 2 times a day. 180 Tablet 4    gabapentin (NEURONTIN) 100 MG Cap Take 1 Capsule by mouth 4 times a day. Prescribed by pain management. 90 Capsule     polyethylene glycol 3350 (MIRALAX) 17 GM/SCOOP Powder polyethylene glycol 3350 17 gram oral powder  packet      albuterol (PROAIR HFA) 108 (90 Base) MCG/ACT Aero Soln inhalation aerosol Inhale 2 Puffs every 6 hours as needed for Shortness of Breath. 8.5 g 11    estradiol (CLIMARA) 0.0375 MG/24HR patch estradiol 0.0375 mg/24 hr weekly transdermal patch      sulfamethoxazole-trimethoprim (BACTRIM DS) 800-160 MG tablet sulfamethoxazole 800 mg-trimethoprim 160 mg tablet   TK 1 T PO  BID (Patient not taking: Reported on 8/28/2023)       No current facility-administered medications on file prior to visit.        Allergies:   Codeine and Morphine hcl    Problem List:   Patient Active Problem List   Diagnosis    Mixed hyperlipidemia    DDD (degenerative disc disease), lumbar    DDD (degenerative disc disease), cervical    Menopausal and postmenopausal disorder    Vitamin D deficiency disease    COPD (chronic obstructive pulmonary disease) ([facility])    Chronic allergic rhinitis    Bladder disorder- OAB -dr. ortiz (gyn)    Primary insomnia    Chronic pain syndrome- d/t ddd l/s and cervical spine- pain mgt- rx hc/apap qd and gabapentin    Opiate dependence, continuous (HCC)- pain mgt    Excessive ear wax, left    Mild intermittent asthma without complication        Surgical History:  Past Surgical History:   Procedure Laterality Date    KNEE ARTHROPLASTY TOTAL Right 1/2015    dr awan at [facility]    BLADDER SLING FEMALE  12/29/2009    Performed by SKYLER ORTIZ at SURGERY [facility] DAY [facility]    RECTOCELE REPAIR  12/29/2009    [facility]    CYSTOCELE REPAIR  12/29/2009    Performed by SKYLER ORTIZ at [facility]    CYSTOSCOPY  12/29/2009    Performed by SKYLER ORTIZ at [facility]ERY [facility] DAY [facility]    VAGINAL SUSPENSION  12/29/2009    Performed by SKYLER ORTIZ at SURGERY SAME DAY [facility]S    COLONOSCOPY-FLEXIBLE  2009    gic-neg    CERVICAL FUSION POSTERIOR      HYSTERECTOMY RADICAL      HYSTEROSCOPY W/V      PB  LAMINOTOMY,LUMBAR DISK,1 INTRSP         Past Social Hx:   Social History     Tobacco Use    Smoking status: Never    Smokeless tobacco: Never   Vaping Use    Vaping Use: Never used   Substance Use Topics    Alcohol use: Yes     Alcohol/week: 0.6 oz     Types: 1 Glasses of wine per week     Comment: occassionally    Drug use: No          Problem list, medications, and allergies reviewed by myself today in Epic.     Objective:     /64 (BP Location: Left arm, Patient Position: Sitting, BP Cuff Size: Adult)   Pulse 95   Temp 36.7 °C (98.1 °F) (Temporal)   Resp 16   Wt 65.3 kg (144 lb)   LMP 12/01/1976   SpO2 95%   BMI 22.89 kg/m²     Physical Exam  Vitals and nursing note reviewed.   Constitutional:       General: She is not in acute distress.     Appearance: Normal appearance. She is not ill-appearing or diaphoretic.   HENT:      Head: Normocephalic and atraumatic.   Cardiovascular:      Rate and Rhythm: Normal rate and regular rhythm.      Heart sounds: Normal heart sounds.   Pulmonary:      Effort: Pulmonary effort is normal. No respiratory distress.      Breath sounds: Normal breath sounds. No stridor. No wheezing, rhonchi or rales.   Chest:      Chest wall: No tenderness.   Abdominal:      General: Bowel sounds are increased. There is no distension.      Palpations: Abdomen is soft. There is no hepatomegaly, splenomegaly, mass or pulsatile mass.      Tenderness: There is abdominal tenderness in the right lower quadrant and left lower quadrant. There is no right CVA tenderness, left CVA tenderness, guarding or rebound. Negative signs include Rovsing's sign and McBurney's sign.      Hernia: No hernia is present.   Musculoskeletal:         General: Normal range of motion.   Skin:     General: Skin is warm and dry.      Capillary Refill: Capillary refill takes less than 2 seconds.      Findings: No rash.   Neurological:      Mental Status: She is alert and oriented to person, place, and time.          Assessment/Plan:     Diagnosis and associated orders:   1. Diarrhea, unspecified type    2. Nausea    Other orders  - HYDROcodone/acetaminophen (NORCO)  MG Tab; TK 1 T PO Q 4-6 H PRN .DNFU 07/23        Comments/MDM:     Due to concerns of dehydration after 4 days of severe diarrhea and an inability to keep hydrated, patient transferred to ER. Patient agreeable to the plan. States she will go to  ER.       Please note that this dictation was created using voice recognition software. I have made a reasonable attempt to correct obvious errors, but I expect that there are errors of grammar and possibly content that I did not discover before finalizing the note.    This note was electronically signed by JOCELYNE Palma

## 2023-09-14 ENCOUNTER — HOSPITAL ENCOUNTER (OUTPATIENT)
Facility: MEDICAL CENTER | Age: 78
End: 2023-09-14
Attending: STUDENT IN AN ORGANIZED HEALTH CARE EDUCATION/TRAINING PROGRAM
Payer: MEDICARE

## 2023-09-14 PROCEDURE — 87186 SC STD MICRODIL/AGAR DIL: CPT

## 2023-09-14 PROCEDURE — 87086 URINE CULTURE/COLONY COUNT: CPT

## 2023-09-14 PROCEDURE — 87077 CULTURE AEROBIC IDENTIFY: CPT

## 2023-10-02 ENCOUNTER — HOSPITAL ENCOUNTER (OUTPATIENT)
Facility: MEDICAL CENTER | Age: 78
End: 2023-10-02
Attending: STUDENT IN AN ORGANIZED HEALTH CARE EDUCATION/TRAINING PROGRAM
Payer: MEDICARE

## 2023-10-02 PROCEDURE — 87077 CULTURE AEROBIC IDENTIFY: CPT | Mod: 91

## 2023-10-02 PROCEDURE — 87186 SC STD MICRODIL/AGAR DIL: CPT

## 2023-10-02 PROCEDURE — 87086 URINE CULTURE/COLONY COUNT: CPT

## 2023-10-12 ENCOUNTER — HOSPITAL ENCOUNTER (OUTPATIENT)
Dept: LAB | Facility: MEDICAL CENTER | Age: 78
End: 2023-10-12
Attending: INTERNAL MEDICINE
Payer: MEDICARE

## 2023-10-12 DIAGNOSIS — E55.9 VITAMIN D DEFICIENCY: ICD-10-CM

## 2023-10-12 DIAGNOSIS — E78.2 MIXED HYPERLIPIDEMIA: ICD-10-CM

## 2023-10-12 DIAGNOSIS — Z11.59 NEED FOR HEPATITIS C SCREENING TEST: ICD-10-CM

## 2023-10-12 DIAGNOSIS — R73.01 IFG (IMPAIRED FASTING GLUCOSE): ICD-10-CM

## 2023-10-12 LAB
25(OH)D3 SERPL-MCNC: 44 NG/ML (ref 30–100)
ALBUMIN SERPL BCP-MCNC: 4.5 G/DL (ref 3.2–4.9)
ALBUMIN/GLOB SERPL: 1.7 G/DL
ALP SERPL-CCNC: 65 U/L (ref 30–99)
ALT SERPL-CCNC: 7 U/L (ref 2–50)
ANION GAP SERPL CALC-SCNC: 11 MMOL/L (ref 7–16)
AST SERPL-CCNC: 18 U/L (ref 12–45)
BASOPHILS # BLD AUTO: 0.5 % (ref 0–1.8)
BASOPHILS # BLD: 0.03 K/UL (ref 0–0.12)
BILIRUB SERPL-MCNC: 0.4 MG/DL (ref 0.1–1.5)
BUN SERPL-MCNC: 14 MG/DL (ref 8–22)
CALCIUM ALBUM COR SERPL-MCNC: 9.6 MG/DL (ref 8.5–10.5)
CALCIUM SERPL-MCNC: 10 MG/DL (ref 8.5–10.5)
CHLORIDE SERPL-SCNC: 108 MMOL/L (ref 96–112)
CHOLEST SERPL-MCNC: 169 MG/DL (ref 100–199)
CO2 SERPL-SCNC: 24 MMOL/L (ref 20–33)
CREAT SERPL-MCNC: 0.68 MG/DL (ref 0.5–1.4)
EOSINOPHIL # BLD AUTO: 0.19 K/UL (ref 0–0.51)
EOSINOPHIL NFR BLD: 3.2 % (ref 0–6.9)
ERYTHROCYTE [DISTWIDTH] IN BLOOD BY AUTOMATED COUNT: 42.6 FL (ref 35.9–50)
EST. AVERAGE GLUCOSE BLD GHB EST-MCNC: 123 MG/DL
FASTING STATUS PATIENT QL REPORTED: NORMAL
GFR SERPLBLD CREATININE-BSD FMLA CKD-EPI: 88 ML/MIN/1.73 M 2
GLOBULIN SER CALC-MCNC: 2.6 G/DL (ref 1.9–3.5)
GLUCOSE SERPL-MCNC: 86 MG/DL (ref 65–99)
HBA1C MFR BLD: 5.9 % (ref 4–5.6)
HCT VFR BLD AUTO: 46.6 % (ref 37–47)
HCV AB SER QL: NORMAL
HDLC SERPL-MCNC: 51 MG/DL
HGB BLD-MCNC: 14.8 G/DL (ref 12–16)
IMM GRANULOCYTES # BLD AUTO: 0.02 K/UL (ref 0–0.11)
IMM GRANULOCYTES NFR BLD AUTO: 0.3 % (ref 0–0.9)
LDLC SERPL CALC-MCNC: 88 MG/DL
LYMPHOCYTES # BLD AUTO: 2.11 K/UL (ref 1–4.8)
LYMPHOCYTES NFR BLD: 35.2 % (ref 22–41)
MCH RBC QN AUTO: 29.8 PG (ref 27–33)
MCHC RBC AUTO-ENTMCNC: 31.8 G/DL (ref 32.2–35.5)
MCV RBC AUTO: 94 FL (ref 81.4–97.8)
MONOCYTES # BLD AUTO: 0.38 K/UL (ref 0–0.85)
MONOCYTES NFR BLD AUTO: 6.3 % (ref 0–13.4)
NEUTROPHILS # BLD AUTO: 3.26 K/UL (ref 1.82–7.42)
NEUTROPHILS NFR BLD: 54.5 % (ref 44–72)
NRBC # BLD AUTO: 0 K/UL
NRBC BLD-RTO: 0 /100 WBC (ref 0–0.2)
PLATELET # BLD AUTO: 325 K/UL (ref 164–446)
PMV BLD AUTO: 10.2 FL (ref 9–12.9)
POTASSIUM SERPL-SCNC: 4.1 MMOL/L (ref 3.6–5.5)
PROT SERPL-MCNC: 7.1 G/DL (ref 6–8.2)
RBC # BLD AUTO: 4.96 M/UL (ref 4.2–5.4)
SODIUM SERPL-SCNC: 143 MMOL/L (ref 135–145)
TRIGL SERPL-MCNC: 148 MG/DL (ref 0–149)
TSH SERPL DL<=0.005 MIU/L-ACNC: 2 UIU/ML (ref 0.38–5.33)
WBC # BLD AUTO: 6 K/UL (ref 4.8–10.8)

## 2023-10-12 PROCEDURE — 82306 VITAMIN D 25 HYDROXY: CPT

## 2023-10-12 PROCEDURE — 84443 ASSAY THYROID STIM HORMONE: CPT

## 2023-10-12 PROCEDURE — 83036 HEMOGLOBIN GLYCOSYLATED A1C: CPT | Mod: GA

## 2023-10-12 PROCEDURE — 80053 COMPREHEN METABOLIC PANEL: CPT

## 2023-10-12 PROCEDURE — 80061 LIPID PANEL: CPT

## 2023-10-12 PROCEDURE — G0472 HEP C SCREEN HIGH RISK/OTHER: HCPCS | Mod: GA

## 2023-10-12 PROCEDURE — 85025 COMPLETE CBC W/AUTO DIFF WBC: CPT

## 2023-10-12 PROCEDURE — 36415 COLL VENOUS BLD VENIPUNCTURE: CPT | Mod: GA

## 2023-10-18 ENCOUNTER — OFFICE VISIT (OUTPATIENT)
Dept: MEDICAL GROUP | Age: 78
End: 2023-10-18
Payer: MEDICARE

## 2023-10-18 VITALS
OXYGEN SATURATION: 94 % | WEIGHT: 145 LBS | SYSTOLIC BLOOD PRESSURE: 138 MMHG | HEART RATE: 66 BPM | HEIGHT: 66 IN | DIASTOLIC BLOOD PRESSURE: 70 MMHG | BODY MASS INDEX: 23.3 KG/M2 | TEMPERATURE: 97.1 F

## 2023-10-18 DIAGNOSIS — E55.9 VITAMIN D DEFICIENCY: ICD-10-CM

## 2023-10-18 DIAGNOSIS — Z00.00 MEDICARE ANNUAL WELLNESS VISIT, SUBSEQUENT: ICD-10-CM

## 2023-10-18 DIAGNOSIS — J41.0 SIMPLE CHRONIC BRONCHITIS (HCC): ICD-10-CM

## 2023-10-18 DIAGNOSIS — E78.2 MIXED HYPERLIPIDEMIA: ICD-10-CM

## 2023-10-18 DIAGNOSIS — G89.4 CHRONIC PAIN SYNDROME: ICD-10-CM

## 2023-10-18 DIAGNOSIS — R73.01 IFG (IMPAIRED FASTING GLUCOSE): ICD-10-CM

## 2023-10-18 DIAGNOSIS — J43.1 PANLOBULAR EMPHYSEMA (HCC): ICD-10-CM

## 2023-10-18 DIAGNOSIS — N32.9 BLADDER DISORDER: ICD-10-CM

## 2023-10-18 DIAGNOSIS — Z23 NEED FOR VACCINATION: ICD-10-CM

## 2023-10-18 DIAGNOSIS — F11.20 OPIATE DEPENDENCE, CONTINUOUS (HCC): ICD-10-CM

## 2023-10-18 PROCEDURE — G0439 PPPS, SUBSEQ VISIT: HCPCS | Mod: 25 | Performed by: INTERNAL MEDICINE

## 2023-10-18 PROCEDURE — 3078F DIAST BP <80 MM HG: CPT | Performed by: INTERNAL MEDICINE

## 2023-10-18 PROCEDURE — 3075F SYST BP GE 130 - 139MM HG: CPT | Performed by: INTERNAL MEDICINE

## 2023-10-18 RX ORDER — MIRABEGRON 25 MG/1
25 TABLET, FILM COATED, EXTENDED RELEASE ORAL 2 TIMES DAILY
Qty: 180 TABLET | Refills: 4 | Status: SHIPPED
Start: 2023-10-18 | End: 2023-11-01

## 2023-10-18 RX ORDER — OMEPRAZOLE 40 MG/1
40 CAPSULE, DELAYED RELEASE ORAL DAILY
Qty: 100 CAPSULE | Refills: 3 | Status: SHIPPED | OUTPATIENT
Start: 2023-10-18

## 2023-10-18 RX ORDER — ROSUVASTATIN CALCIUM 10 MG/1
10 TABLET, COATED ORAL EVERY EVENING
Qty: 90 TABLET | Refills: 4 | Status: SHIPPED | OUTPATIENT
Start: 2023-10-18

## 2023-10-18 RX ORDER — GABAPENTIN 100 MG/1
100 CAPSULE ORAL 4 TIMES DAILY
Qty: 90 CAPSULE | Refills: 3 | Status: SHIPPED | OUTPATIENT
Start: 2023-10-18

## 2023-10-18 ASSESSMENT — PATIENT HEALTH QUESTIONNAIRE - PHQ9: CLINICAL INTERPRETATION OF PHQ2 SCORE: 0

## 2023-10-18 ASSESSMENT — ENCOUNTER SYMPTOMS: GENERAL WELL-BEING: GOOD

## 2023-10-18 ASSESSMENT — FIBROSIS 4 INDEX: FIB4 SCORE: 1.67

## 2023-10-18 ASSESSMENT — ACTIVITIES OF DAILY LIVING (ADL): BATHING_REQUIRES_ASSISTANCE: 0

## 2023-10-18 NOTE — PROGRESS NOTES
Chief Complaint   Patient presents with    Annual Exam     Annual exam          HPI:  Berenice Mart is a 78 y.o. here for Medicare Annual Wellness Visit   And  The patient is here for followup of chronic medical problems listed below. The patient is compliant with medications and having no side effects from them. Denies chest pain, abdominal pain, dyspnea, myalgias, or cough.     Patient Active Problem List    Diagnosis Date Noted    Excessive ear wax, left 09/08/2021    Mild intermittent asthma without complication 09/08/2021    Primary insomnia 01/04/2018    Chronic pain syndrome- d/t ddd l/s and cervical spine- pain mgt- rx hc/apap qd and gabapentin 01/04/2018    Opiate dependence, continuous (HCC)- pain mgt 01/04/2018    Bladder disorder- OAB -dr. willoughby (gyn) 05/20/2015    Chronic allergic rhinitis 04/03/2014    Vitamin D deficiency disease 12/17/2013    COPD (chronic obstructive pulmonary disease) (HCC) 12/17/2013    Mixed hyperlipidemia 12/01/2011    DDD (degenerative disc disease), lumbar 12/01/2011    DDD (degenerative disc disease), cervical 12/01/2011    Menopausal and postmenopausal disorder 12/01/2011       Current Outpatient Medications   Medication Sig Dispense Refill    HYDROcodone/acetaminophen (NORCO)  MG Tab TK 1 T PO Q 4-6 H PRN .DNFU 07/23      zolpidem (AMBIEN) 10 MG Tab Take 1 Tablet by mouth at bedtime as needed for Sleep for up to 90 days. 90 Tablet 0    fenofibrate micronized (LOFIBRA) 134 MG capsule TAKE ONE CAPSULE BY MOUTH EVERY MORNING BEFORE BREAKFAST 90 Capsule 4    rosuvastatin (CRESTOR) 10 MG Tab Take 1 Tablet by mouth every evening. 90 Tablet 4    omeprazole (PRILOSEC) 40 MG delayed-release capsule Take 1 Capsule by mouth every day. 90 Capsule 3    methocarbamol (ROBAXIN) 750 MG Tab Take 1 Tablet by mouth 4 times a day.      dicyclomine (BENTYL) 10 MG Cap TAKE ONE CAPSULE BY MOUTH TWO TIMES A DAY. before breakfast and dinner 180 Capsule 4    mirabegron ER  (MYRBETRIQ) 25 MG TABLET SR 24 HR Take 1 Tablet by mouth 2 times a day. 180 Tablet 4    gabapentin (NEURONTIN) 100 MG Cap Take 1 Capsule by mouth 4 times a day. Prescribed by pain management. 90 Capsule     polyethylene glycol 3350 (MIRALAX) 17 GM/SCOOP Powder polyethylene glycol 3350 17 gram oral powder packet      albuterol (PROAIR HFA) 108 (90 Base) MCG/ACT Aero Soln inhalation aerosol Inhale 2 Puffs every 6 hours as needed for Shortness of Breath. 8.5 g 11    estradiol (CLIMARA) 0.0375 MG/24HR patch Cream      sulfamethoxazole-trimethoprim (BACTRIM DS) 800-160 MG tablet sulfamethoxazole 800 mg-trimethoprim 160 mg tablet   TK 1 T PO  BID (Patient not taking: Reported on 8/28/2023)       No current facility-administered medications for this visit.          Current supplements as per medication list.     Allergies: Codeine and Morphine hcl    Current social contact/activities: talk with neighbors, lunchin with friends, and talk on the phone with friends and family.      She  reports that she has never smoked. She has never used smokeless tobacco. She reports current alcohol use of about 0.6 oz of alcohol per week. She reports that she does not use drugs.  Counseling given: Not Answered      ROS:    Gait: Uses no assistive device  Ostomy: No  Other tubes: No  Amputations: No  Chronic oxygen use: No  Last eye exam: 2022  Wears hearing aids: No   : Reports urinary leakage during the last 6 months that has somewhat interfered with their daily activities or sleep.    Screening:    Depression Screening  Little interest or pleasure in doing things?  0 - not at all  Feeling down, depressed , or hopeless? 0 - not at all  Patient Health Questionnaire Score: 0     If depressive symptoms identified deferred to follow up visit unless specifically addressed in assessment and plan.    Interpretation of PHQ-9 Total Score   Score Severity   1-4 No Depression   5-9 Mild Depression   10-14 Moderate Depression   15-19 Moderately  Severe Depression   20-27 Severe Depression    Screening for Cognitive Impairment  Do you or any of your friends or family members have any concern about your memory? Yes  Three Minute Recall (Banana, Sunrise, Chair) 3/3    Jin clock face with all 12 numbers and set the hands to show 20 past 8.  Yes    Cognitive concerns identified deferred for follow up unless specifically addressed in assessment and plan.    Fall Risk Assessment  Has the patient had two or more falls in the last year or any fall with injury in the last year?  No    Safety Assessment  Do you always wear your seatbelt?  Yes  Any changes to home needed to function safely? No  Difficulty hearing.  No  Patient counseled about all safety risks that were identified.    Functional Assessment ADLs  Are there any barriers preventing you from cooking for yourself or meeting nutritional needs?  No.    Are there any barriers preventing you from driving safely or obtaining transportation?  No.    Are there any barriers preventing you from using a telephone or calling for help?  No    Are there any barriers preventing you from shopping?  No.    Are there any barriers preventing you from taking care of your own finances?  No    Are there any barriers preventing you from managing your medications?  No    Are there any barriers preventing you from showering, bathing or dressing yourself? No    Are there any barriers preventing you from doing housework or laundry? No  Are there any barriers preventing you from using the toilet?No  Are you currently engaging in any exercise or physical activity?  Yes.      Self-Assessment of Health  What is your perception of your health? Good  Do you sleep more than six hours a night? Yes  In the past 7 days, how much did pain keep you from doing your normal work? Some  Do you spend quality time with family or friends (virtually or in person)? Yes  Do you usually eat a heart healthy diet that constists of a variety of fruits,  vegetables, whole grains and fiber? Yes  Do you eat foods high in fat and/or Fast Food more than three times per week? No    Advance Care Planning  Do you have an Advance Directive, Living Will, Durable Power of , or POLST? Yes  Advance Directive Living Will Durable Power of    is not on file - instructed patient to bring in a copy to scan into their chart      Health Maintenance Summary            Overdue - Annual Pulmonary Function Test / Spirometry (Yearly) Overdue - never done      No completion history exists for this topic.              Overdue - Hepatitis B Vaccine (Hep B) (2 of 3 - 19+ 3-dose series) Overdue since 2/5/2020 01/08/2020  Imm Admin: Hepatitis B Vaccine (Adol/Adult)              Overdue - Annual Wellness Visit (Every 366 Days) Overdue since 7/9/2021 07/08/2020  Subsequent Annual Wellness Visit - Includes PPPS ()    07/08/2020  Visit Dx: Medicare annual wellness visit, subsequent    01/04/2018  Visit Dx: Medicare annual wellness visit, subsequent    05/20/2015  Visit Dx: Medicare annual wellness visit, subsequent              Overdue - COVID-19 Vaccine (5 - Pfizer series) Overdue since 5/24/2023 01/24/2023  Imm Admin: PFIZER BIVALENT SARS-COV-2 VACCINE (12+)    10/13/2021  Imm Admin: PFIZER PURPLE CAP SARS-COV-2 VACCINATION (12+)    02/11/2021  Imm Admin: PFIZER PURPLE CAP SARS-COV-2 VACCINATION (12+)    01/21/2021  Imm Admin: PFIZER PURPLE CAP SARS-COV-2 VACCINATION (12+)              Overdue - Influenza Vaccine (1) Overdue since 9/1/2023 09/19/2022  Outside Immunization: Influenza Quad Adjuvanted    09/16/2021  Imm Admin: Influenza, Unspecified - HISTORICAL DATA    09/15/2020  Imm Admin: Influenza Vaccine Quad Inj (Pf)    10/10/2019  Imm Admin: Influenza, Unspecified - HISTORICAL DATA    10/15/2018  Imm Admin: Influenza, Unspecified - HISTORICAL DATA    Only the first 5 history entries have been loaded, but more history exists.              Bone Density  Scan (Every 5 Years) Tentatively due on 1/25/2024 01/25/2019  DS-BONE DENSITY STUDY (DEXA)    03/08/2013  DS-BONE DENSITY STUDY (DEXA)    04/11/2007  DS-BONE DENSITY STUDY (DEXA)              IMM DTaP/Tdap/Td Vaccine (2 - Td or Tdap) Next due on 1/8/2030 01/08/2020  Imm Admin: Tdap Vaccine              Pneumococcal Vaccine: 65+ Years (Series Information) Completed      11/20/2015  Imm Admin: Pneumococcal Conjugate Vaccine (Prevnar/PCV-13)    09/26/2012  Imm Admin: Pneumococcal polysaccharide vaccine (PPSV-23)    05/18/2008  Imm Admin: Pneumococcal Conjugate Vaccine (Prevnar/PCV-13)              Zoster (Shingles) Vaccines (Series Information) Completed      09/04/2019  Imm Admin: Zoster Vaccine Recombinant (RZV) (SHINGRIX)    06/17/2019  Imm Admin: Zoster Vaccine Recombinant (RZV) (SHINGRIX)    10/03/2016  Imm Admin: Zoster Vaccine Live (ZVL) (Zostavax) - HISTORICAL DATA    10/03/2016  Imm Admin: Zoster Vaccine Live (ZVL) (Zostavax) - HISTORICAL DATA              Hepatitis A Vaccine (Hep A) (Series Information) Aged Out      No completion history exists for this topic.              HPV Vaccines (Series Information) Aged Out      No completion history exists for this topic.              Polio Vaccine (Inactivated Polio) (Series Information) Aged Out      No completion history exists for this topic.              Meningococcal Immunization (Series Information) Aged Out      No completion history exists for this topic.              Discontinued - Mammogram  Discontinued        Frequency changed to Never automatically (Topic No Longer Applies)    09/29/2022  MA-SCREENING MAMMO BILAT W/TOMOSYNTHESIS W/CAD    06/03/2021  MA-SCREENING MAMMO BILAT W/TOMOSYNTHESIS W/CAD    11/18/2019  MA-SCREENING MAMMO BILAT W/TOMOSYNTHESIS W/CAD    11/16/2018  MA-SCREENING MAMMO BILAT W/TOMOSYNTHESIS W/CAD    Only the first 5 history entries have been loaded, but more history exists.              Discontinued - Colorectal Cancer  Screening  Discontinued        Frequency changed to Never automatically (Topic No Longer Applies)    10/12/2017  REFERRAL TO GI FOR COLONOSCOPY    2012  OCCULT BLOOD FECES IMMUNOASSAY    2007  AMB REFERRAL TO GI FOR COLONOSCOPY              Discontinued - Hepatitis C Screening  Discontinued        Frequency changed to Never automatically (Topic No Longer Applies)    10/12/2023  Hepatitis C Antibody component of HCV Scrn ( [id number] 1xLife - Medicare Patients Only)                    Patient Care Team:  David Díaz M.D. as PCP - General  Kalie Chin M.D. as Consulting Physician (Obstetrics & Gynecology)  Eliu Elena M.D. as Consulting Physician (Anesthesiology)  Clifton Hernandez O.D. as Consulting Physician (Optometry)  Rajendra Daniel M.D. as Consulting Physician (Orthopedic Surgery)        Social History     Tobacco Use    Smoking status: Never    Smokeless tobacco: Never   Vaping Use    Vaping Use: Never used   Substance Use Topics    Alcohol use: Yes     Alcohol/week: 0.6 oz     Types: 1 Glasses of wine per week     Comment: occassionally    Drug use: No     Family History   Problem Relation Age of Onset    Cancer Paternal Aunt     Heart Disease Mother     GI Disease Father     Cancer Father     No Known Problems Maternal Grandmother     No Known Problems Maternal Grandfather     No Known Problems Paternal Grandmother     No Known Problems Paternal Grandfather      She  has a past medical history of Anesthesia, Arthritis, Hypertriglyceridemia, Other specified symptom associated with female genital organs, Pain, Unspecified urinary incontinence, and Urinary bladder disorder.   Past Surgical History:   Procedure Laterality Date    KNEE ARTHROPLASTY TOTAL Right 2015    dr awan at [facility]    BLADDER SLING FEMALE  2009    Performed by KLAIE CHIN at [facility]ERY [facility]    RECTOCELE REPAIR  2009    Performed by KALIE CHIN at SURGERY SAME  DAY [facility]    CYSTOCELE REPAIR  12/29/2009    Performed by SKYLER ORTIZ at SURGERY SAME DAY [facility]    CYSTOSCOPY  12/29/2009    Performed by SKYLER ORTIZ at SURGERY SAME DAY [facility]    VAGINAL SUSPENSION  12/29/2009    Performed by SKYLER ORTIZ at SURGERY SAME DAY [facility]    COLONOSCOPY-FLEXIBLE  2009    [facility]    CERVICAL FUSION POSTERIOR      HYSTERECTOMY RADICAL      HYSTEROSCOPY W/V      PB LAMINOTOMY,LUMBAR DISK,1 INTRSP         Exam:   There were no vitals taken for this visit. There is no height or weight on file to calculate BMI.    Hearing excellent.    Dentition good  Alert, oriented in no acute distress.  Eye contact is good, speech goal directed, affect calm  HEENT clear.  Neck and thyroid normal without thyromegaly or adenopathy.  Lungs are clear.  Heart regular in rhythm without murmur gallop rub or jugular sustention.  No peripheral edema.  Abdomen benign without HSM or masses.  Neurological normal with no focal deficits.  Assessment and Plan. The following treatment and monitoring plan is recommended:     1. Medicare ronal all metrics reviewed and al wellness visit, subsequent  All metrics reviewed and updated.    2. Mixed hyperlipidemia  Control continue current regimen  - rosuvastatin (CRESTOR) 10 MG Tab; Take 1 Tablet by mouth every evening.  Dispense: 90 Tablet; Refill: 4  - CBC WITH DIFFERENTIAL; Future  - TSH; Future  - Lipid Profile; Future  - Comp Metabolic Panel; Future    3. Bladder disorder- OAB -dr. ortiz (gyn)  Controlled continue current regimen  - mirabegron ER (MYRBETRIQ) 25 MG TABLET SR 24 HR; Take 1 Tablet by mouth 2 times a day.  Dispense: 180 Tablet; Refill: 4    4. Chronic pain syndrome- d/t ddd l/s and cervical spine- pain mgt- rx hc/apap qd and gabapentin  Controlled continue current regimen  - gabapentin (NEURONTIN) 100 MG Cap; Take 1 Capsule by mouth 4 times a day. Prescribed by pain management.  Dispense: 90 Capsule; Refill:  3    5. IFG (impaired fasting glucose)  Good control continue Mediterranean diet and exercise  - HEMOGLOBIN A1C; Future    6. Vitamin D deficiency  Good control continue vitamin D3 2000 units daily.  - VITAMIN D,25 HYDROXY (DEFICIENCY); Future    7. Opiate dependence, continuous (HCC)- pain mgt  Control continue pain management opiate therapy at pain management facility.  Drug screen to be done by them.    8. Simple chronic bronchitis (HCC)  Good control continue current regimen.  Albuterol inhaler as needed.  Patient is wheeze free and does not need any inhaled steroids at this time    9. Need for vaccination  We will get influenza vaccine COVID-vaccine RSV vaccine, and hep B vaccine series at pharmacy.  Completed 2 dose Shingrix vaccine already.    10. Panlobular emphysema (HCC)  As above.  Albuterol inhaler.  Patient declines PFTs.             Services suggested: No services needed at this time  Health Care Screening: Age-appropriate preventive services recommended by USPTF and ACIP covered by Medicare were discussed today. Services ordered if indicated and agreed upon by the patient.  Referrals offered: Community-based lifestyle interventions to reduce health risks and promote self-management and wellness, fall prevention, nutrition, physical activity, tobacco-use cessation, weight loss, and mental health services as per orders if indicated.    Discussion today about general wellness and lifestyle habits:    Prevent falls and reduce trip hazards; Cautioned about securing or removing rugs.  Have a working fire alarm and carbon monoxide detector;   Engage in regular physical activity and social activities     Follow-up: No follow-ups on file.

## 2023-11-01 RX ORDER — MIRABEGRON 50 MG/1
50 TABLET, FILM COATED, EXTENDED RELEASE ORAL DAILY
Qty: 90 TABLET | Refills: 3 | Status: SHIPPED | OUTPATIENT
Start: 2023-11-01

## 2023-11-01 RX ORDER — MIRABEGRON 50 MG/1
50 TABLET, FILM COATED, EXTENDED RELEASE ORAL DAILY
COMMUNITY
End: 2023-11-01

## 2023-11-01 NOTE — TELEPHONE ENCOUNTER
Received request via: Pharmacy    Was the patient seen in the last year in this department? Yes    Does the patient have an active prescription (recently filled or refills available) for medication(s) requested?  Yes, but insurance will not cover 2 tabs daily her previous RX from Gyn was for 50 mg 1 tab daily which insurance will cover.    Does the patient have longterm Plus and need 100 day supply (blood pressure, diabetes and cholesterol meds only)? Patient does not have SCP

## 2024-03-05 ENCOUNTER — APPOINTMENT (RX ONLY)
Dept: URBAN - METROPOLITAN AREA CLINIC 22 | Facility: CLINIC | Age: 79
Setting detail: DERMATOLOGY
End: 2024-03-05

## 2024-03-05 DIAGNOSIS — L81.4 OTHER MELANIN HYPERPIGMENTATION: ICD-10-CM

## 2024-03-05 DIAGNOSIS — Z71.89 OTHER SPECIFIED COUNSELING: ICD-10-CM

## 2024-03-05 DIAGNOSIS — Z85.828 PERSONAL HISTORY OF OTHER MALIGNANT NEOPLASM OF SKIN: ICD-10-CM

## 2024-03-05 DIAGNOSIS — L82.1 OTHER SEBORRHEIC KERATOSIS: ICD-10-CM

## 2024-03-05 DIAGNOSIS — L57.0 ACTINIC KERATOSIS: ICD-10-CM

## 2024-03-05 DIAGNOSIS — D18.0 HEMANGIOMA: ICD-10-CM

## 2024-03-05 DIAGNOSIS — D22 MELANOCYTIC NEVI: ICD-10-CM

## 2024-03-05 PROBLEM — D22.5 MELANOCYTIC NEVI OF TRUNK: Status: ACTIVE | Noted: 2024-03-05

## 2024-03-05 PROBLEM — D18.01 HEMANGIOMA OF SKIN AND SUBCUTANEOUS TISSUE: Status: ACTIVE | Noted: 2024-03-05

## 2024-03-05 PROCEDURE — ? LIQUID NITROGEN

## 2024-03-05 PROCEDURE — 17000 DESTRUCT PREMALG LESION: CPT

## 2024-03-05 PROCEDURE — ? COUNSELING

## 2024-03-05 PROCEDURE — 17003 DESTRUCT PREMALG LES 2-14: CPT

## 2024-03-05 PROCEDURE — 99213 OFFICE O/P EST LOW 20 MIN: CPT | Mod: 25

## 2024-03-05 PROCEDURE — ? PRESCRIPTION

## 2024-03-05 PROCEDURE — ? ADDITIONAL NOTES

## 2024-03-05 PROCEDURE — ? SUNSCREEN RECOMMENDATIONS

## 2024-03-05 RX ORDER — HYDROQUINONE 40 MG/G
CREAM TOPICAL QD
Qty: 28.4 | Refills: 3 | Status: ERX | COMMUNITY
Start: 2024-03-05

## 2024-03-05 RX ADMIN — HYDROQUINONE: 40 CREAM TOPICAL at 00:00

## 2024-03-05 ASSESSMENT — LOCATION DETAILED DESCRIPTION DERM
LOCATION DETAILED: RIGHT INFERIOR FOREHEAD
LOCATION DETAILED: LEFT LATERAL ABDOMEN
LOCATION DETAILED: LEFT CENTRAL LATERAL NECK
LOCATION DETAILED: RIGHT MID-UPPER BACK
LOCATION DETAILED: LEFT MEDIAL SUPERIOR CHEST
LOCATION DETAILED: LEFT INFERIOR UPPER BACK
LOCATION DETAILED: RIGHT CAVUM CONCHA
LOCATION DETAILED: SUPERIOR THORACIC SPINE
LOCATION DETAILED: LEFT CENTRAL MALAR CHEEK
LOCATION DETAILED: LEFT SUPERIOR CENTRAL MALAR CHEEK
LOCATION DETAILED: RIGHT CENTRAL LATERAL NECK
LOCATION DETAILED: RIGHT INFERIOR CRUS OF ANTIHELIX
LOCATION DETAILED: LEFT PROXIMAL DORSAL FOREARM

## 2024-03-05 ASSESSMENT — LOCATION SIMPLE DESCRIPTION DERM
LOCATION SIMPLE: RIGHT UPPER BACK
LOCATION SIMPLE: LEFT UPPER BACK
LOCATION SIMPLE: LEFT CHEEK
LOCATION SIMPLE: ABDOMEN
LOCATION SIMPLE: UPPER BACK
LOCATION SIMPLE: CHEST
LOCATION SIMPLE: NECK
LOCATION SIMPLE: RIGHT FOREHEAD
LOCATION SIMPLE: LEFT FOREARM
LOCATION SIMPLE: RIGHT EAR

## 2024-03-05 ASSESSMENT — LOCATION ZONE DERM
LOCATION ZONE: NECK
LOCATION ZONE: ARM
LOCATION ZONE: EAR
LOCATION ZONE: TRUNK
LOCATION ZONE: FACE

## 2024-03-05 NOTE — PROCEDURE: ADDITIONAL NOTES
Render Risk Assessment In Note?: no
Detail Level: Simple
Additional Notes: Ear re-checked today, scar tissue but NER of BCC

## 2024-03-05 NOTE — PROCEDURE: LIQUID NITROGEN
Show Aperture Variable?: Yes
Render Post-Care Instructions In Note?: no
Detail Level: Detailed
Consent: The patient's consent was obtained including but not limited to risks of crusting, scabbing, blistering, scarring, darker or lighter pigmentary change, recurrence, incomplete removal and infection.
Duration Of Freeze Thaw-Cycle (Seconds): 3
Post-Care Instructions: I reviewed with the patient in detail post-care instructions. Patient is to wear sunprotection, and avoid picking at any of the treated lesions. Pt may apply Vaseline to crusted or scabbing areas.
Number Of Freeze-Thaw Cycles: 2 freeze-thaw cycles

## 2024-04-02 ENCOUNTER — HOSPITAL ENCOUNTER (OUTPATIENT)
Facility: MEDICAL CENTER | Age: 79
End: 2024-04-02
Attending: STUDENT IN AN ORGANIZED HEALTH CARE EDUCATION/TRAINING PROGRAM
Payer: MEDICARE

## 2024-04-02 PROCEDURE — 87086 URINE CULTURE/COLONY COUNT: CPT

## 2024-04-02 PROCEDURE — 87077 CULTURE AEROBIC IDENTIFY: CPT | Mod: 91

## 2024-04-22 ENCOUNTER — HOSPITAL ENCOUNTER (OUTPATIENT)
Facility: MEDICAL CENTER | Age: 79
End: 2024-04-22
Attending: STUDENT IN AN ORGANIZED HEALTH CARE EDUCATION/TRAINING PROGRAM
Payer: MEDICARE

## 2024-04-22 PROCEDURE — 87186 SC STD MICRODIL/AGAR DIL: CPT

## 2024-04-22 PROCEDURE — 87077 CULTURE AEROBIC IDENTIFY: CPT

## 2024-04-22 PROCEDURE — 87086 URINE CULTURE/COLONY COUNT: CPT

## 2024-05-02 ENCOUNTER — HOSPITAL ENCOUNTER (OUTPATIENT)
Dept: LAB | Facility: MEDICAL CENTER | Age: 79
End: 2024-05-02
Attending: INTERNAL MEDICINE
Payer: MEDICARE

## 2024-05-02 DIAGNOSIS — E55.9 VITAMIN D DEFICIENCY: ICD-10-CM

## 2024-05-02 DIAGNOSIS — E78.2 MIXED HYPERLIPIDEMIA: ICD-10-CM

## 2024-05-02 DIAGNOSIS — R73.01 IFG (IMPAIRED FASTING GLUCOSE): ICD-10-CM

## 2024-05-02 LAB
BASOPHILS # BLD AUTO: 0.3 % (ref 0–1.8)
BASOPHILS # BLD: 0.02 K/UL (ref 0–0.12)
EOSINOPHIL # BLD AUTO: 0.23 K/UL (ref 0–0.51)
EOSINOPHIL NFR BLD: 3.6 % (ref 0–6.9)
ERYTHROCYTE [DISTWIDTH] IN BLOOD BY AUTOMATED COUNT: 42.5 FL (ref 35.9–50)
EST. AVERAGE GLUCOSE BLD GHB EST-MCNC: 128 MG/DL
HBA1C MFR BLD: 6.1 % (ref 4–5.6)
HCT VFR BLD AUTO: 49 % (ref 37–47)
HGB BLD-MCNC: 15.5 G/DL (ref 12–16)
IMM GRANULOCYTES # BLD AUTO: 0.03 K/UL (ref 0–0.11)
IMM GRANULOCYTES NFR BLD AUTO: 0.5 % (ref 0–0.9)
LYMPHOCYTES # BLD AUTO: 2.49 K/UL (ref 1–4.8)
LYMPHOCYTES NFR BLD: 38.7 % (ref 22–41)
MCH RBC QN AUTO: 30.3 PG (ref 27–33)
MCHC RBC AUTO-ENTMCNC: 31.6 G/DL (ref 32.2–35.5)
MCV RBC AUTO: 95.9 FL (ref 81.4–97.8)
MONOCYTES # BLD AUTO: 0.48 K/UL (ref 0–0.85)
MONOCYTES NFR BLD AUTO: 7.5 % (ref 0–13.4)
NEUTROPHILS # BLD AUTO: 3.18 K/UL (ref 1.82–7.42)
NEUTROPHILS NFR BLD: 49.4 % (ref 44–72)
NRBC # BLD AUTO: 0 K/UL
NRBC BLD-RTO: 0 /100 WBC (ref 0–0.2)
PLATELET # BLD AUTO: 369 K/UL (ref 164–446)
PMV BLD AUTO: 10.2 FL (ref 9–12.9)
RBC # BLD AUTO: 5.11 M/UL (ref 4.2–5.4)
WBC # BLD AUTO: 6.4 K/UL (ref 4.8–10.8)

## 2024-05-03 LAB
25(OH)D3 SERPL-MCNC: 43 NG/ML (ref 30–100)
ALBUMIN SERPL BCP-MCNC: 4.6 G/DL (ref 3.2–4.9)
ALBUMIN/GLOB SERPL: 1.9 G/DL
ALP SERPL-CCNC: 68 U/L (ref 30–99)
ALT SERPL-CCNC: 14 U/L (ref 2–50)
ANION GAP SERPL CALC-SCNC: 12 MMOL/L (ref 7–16)
AST SERPL-CCNC: 22 U/L (ref 12–45)
BILIRUB SERPL-MCNC: 0.5 MG/DL (ref 0.1–1.5)
BUN SERPL-MCNC: 14 MG/DL (ref 8–22)
CALCIUM ALBUM COR SERPL-MCNC: 9.5 MG/DL (ref 8.5–10.5)
CALCIUM SERPL-MCNC: 10 MG/DL (ref 8.5–10.5)
CHLORIDE SERPL-SCNC: 105 MMOL/L (ref 96–112)
CHOLEST SERPL-MCNC: 164 MG/DL (ref 100–199)
CO2 SERPL-SCNC: 24 MMOL/L (ref 20–33)
CREAT SERPL-MCNC: 0.56 MG/DL (ref 0.5–1.4)
FASTING STATUS PATIENT QL REPORTED: NORMAL
GFR SERPLBLD CREATININE-BSD FMLA CKD-EPI: 92 ML/MIN/1.73 M 2
GLOBULIN SER CALC-MCNC: 2.4 G/DL (ref 1.9–3.5)
GLUCOSE SERPL-MCNC: 92 MG/DL (ref 65–99)
HDLC SERPL-MCNC: 56 MG/DL
LDLC SERPL CALC-MCNC: 85 MG/DL
POTASSIUM SERPL-SCNC: 4.6 MMOL/L (ref 3.6–5.5)
PROT SERPL-MCNC: 7 G/DL (ref 6–8.2)
SODIUM SERPL-SCNC: 141 MMOL/L (ref 135–145)
TRIGL SERPL-MCNC: 113 MG/DL (ref 0–149)
TSH SERPL DL<=0.005 MIU/L-ACNC: 1.97 UIU/ML (ref 0.38–5.33)

## 2024-05-06 ENCOUNTER — HOSPITAL ENCOUNTER (OUTPATIENT)
Facility: MEDICAL CENTER | Age: 79
End: 2024-05-06
Attending: STUDENT IN AN ORGANIZED HEALTH CARE EDUCATION/TRAINING PROGRAM
Payer: MEDICARE

## 2024-05-08 ENCOUNTER — APPOINTMENT (OUTPATIENT)
Dept: MEDICAL GROUP | Age: 79
End: 2024-05-08
Payer: MEDICARE

## 2024-05-08 VITALS
HEIGHT: 66 IN | DIASTOLIC BLOOD PRESSURE: 70 MMHG | BODY MASS INDEX: 23.46 KG/M2 | TEMPERATURE: 98.1 F | WEIGHT: 146 LBS | HEART RATE: 69 BPM | OXYGEN SATURATION: 94 % | SYSTOLIC BLOOD PRESSURE: 110 MMHG

## 2024-05-08 DIAGNOSIS — R73.01 IFG (IMPAIRED FASTING GLUCOSE): ICD-10-CM

## 2024-05-08 DIAGNOSIS — G89.4 CHRONIC PAIN SYNDROME: ICD-10-CM

## 2024-05-08 DIAGNOSIS — J45.20 MILD INTERMITTENT ASTHMA WITHOUT COMPLICATION: ICD-10-CM

## 2024-05-08 DIAGNOSIS — F51.01 PRIMARY INSOMNIA: ICD-10-CM

## 2024-05-08 DIAGNOSIS — E78.2 MIXED HYPERLIPIDEMIA: ICD-10-CM

## 2024-05-08 DIAGNOSIS — N95.2 ATROPHIC VAGINITIS: ICD-10-CM

## 2024-05-08 DIAGNOSIS — E55.9 VITAMIN D DEFICIENCY: ICD-10-CM

## 2024-05-08 DIAGNOSIS — F11.20 OPIATE DEPENDENCE, CONTINUOUS (HCC): ICD-10-CM

## 2024-05-08 DIAGNOSIS — B96.20 E. COLI UTI: ICD-10-CM

## 2024-05-08 DIAGNOSIS — R10.9 ABDOMINAL CRAMPS: ICD-10-CM

## 2024-05-08 DIAGNOSIS — N39.0 E. COLI UTI: ICD-10-CM

## 2024-05-08 PROBLEM — H61.22 EXCESSIVE EAR WAX, LEFT: Status: RESOLVED | Noted: 2021-09-08 | Resolved: 2024-05-08

## 2024-05-08 PROCEDURE — 3074F SYST BP LT 130 MM HG: CPT | Performed by: INTERNAL MEDICINE

## 2024-05-08 PROCEDURE — 3078F DIAST BP <80 MM HG: CPT | Performed by: INTERNAL MEDICINE

## 2024-05-08 PROCEDURE — 99214 OFFICE O/P EST MOD 30 MIN: CPT | Performed by: INTERNAL MEDICINE

## 2024-05-08 RX ORDER — ESTRADIOL 0.1 MG/G
1 CREAM VAGINAL
Status: SHIPPED
Start: 2024-05-08

## 2024-05-08 RX ORDER — GABAPENTIN 100 MG/1
100 CAPSULE ORAL 4 TIMES DAILY
Qty: 90 CAPSULE | Refills: 3 | Status: SHIPPED | OUTPATIENT
Start: 2024-05-08

## 2024-05-08 RX ORDER — METHOCARBAMOL 750 MG/1
750 TABLET, FILM COATED ORAL 4 TIMES DAILY
Qty: 120 TABLET | Refills: 3 | Status: SHIPPED | OUTPATIENT
Start: 2024-05-08

## 2024-05-08 RX ORDER — ROSUVASTATIN CALCIUM 10 MG/1
10 TABLET, COATED ORAL EVERY EVENING
Qty: 90 TABLET | Refills: 4 | Status: SHIPPED | OUTPATIENT
Start: 2024-05-08

## 2024-05-08 RX ORDER — OMEPRAZOLE 40 MG/1
40 CAPSULE, DELAYED RELEASE ORAL DAILY
Qty: 100 CAPSULE | Refills: 3 | Status: SHIPPED | OUTPATIENT
Start: 2024-05-08

## 2024-05-08 RX ORDER — FENOFIBRATE 134 MG/1
CAPSULE ORAL
Qty: 90 CAPSULE | Refills: 4 | Status: SHIPPED | OUTPATIENT
Start: 2024-05-08

## 2024-05-08 RX ORDER — DICYCLOMINE HYDROCHLORIDE 10 MG/1
CAPSULE ORAL
Qty: 180 CAPSULE | Refills: 4 | Status: SHIPPED | OUTPATIENT
Start: 2024-05-08

## 2024-05-08 ASSESSMENT — ENCOUNTER SYMPTOMS
EYES NEGATIVE: 1
RESPIRATORY NEGATIVE: 1
MUSCULOSKELETAL NEGATIVE: 1
CONSTITUTIONAL NEGATIVE: 1
GASTROINTESTINAL NEGATIVE: 1
PSYCHIATRIC NEGATIVE: 1
CARDIOVASCULAR NEGATIVE: 1
NEUROLOGICAL NEGATIVE: 1

## 2024-05-08 ASSESSMENT — FIBROSIS 4 INDEX: FIB4 SCORE: 1.27

## 2024-05-08 ASSESSMENT — PATIENT HEALTH QUESTIONNAIRE - PHQ9: CLINICAL INTERPRETATION OF PHQ2 SCORE: 0

## 2024-05-08 NOTE — PROGRESS NOTES
Subjective     Esthela Mart is a 78 y.o. female who presents with Follow-Up (Lab review / medication refills )  .fui  Patient Active Problem List   Diagnosis    Mixed hyperlipidemia    DDD (degenerative disc disease), lumbar    DDD (degenerative disc disease), cervical    Menopausal and postmenopausal disorder    Vitamin D deficiency disease    COPD (chronic obstructive pulmonary disease) (HCC)    Chronic allergic rhinitis    Bladder disorder- OAB -dr. willoughby (gyn)    Primary insomnia    Chronic pain syndrome- d/t ddd l/s and cervical spine- pain mgt- rx hc/apap qd and gabapentin    Opiate dependence, continuous (HCC)- pain mgt    Mild intermittent asthma without complication     I have discontinued Esthela Mart's estradiol, albuterol, and polyethylene glycol 3350. I am also having her start on estradiol. Additionally, I am having her maintain her HYDROcodone/acetaminophen, Myrbetriq, omeprazole, rosuvastatin, methocarbamol, gabapentin, fenofibrate micronized, and dicyclomine.  Hospital Outpatient Visit on 05/06/2024   Component Date Value    Significant Indicator 05/06/2024 POS (POS)     Source 05/06/2024 UR     Site 05/06/2024 -     Culture Result 05/06/2024 - (A)     Culture Result 05/06/2024  (A)                     Value:Lactose fermenting Gram negative ada  >100,000 cfu/mL     Hospital Outpatient Visit on 05/02/2024   Component Date Value    Sodium 05/02/2024 141     Potassium 05/02/2024 4.6     Chloride 05/02/2024 105     Co2 05/02/2024 24     Anion Gap 05/02/2024 12.0     Glucose 05/02/2024 92     Bun 05/02/2024 14     Creatinine 05/02/2024 0.56     Calcium 05/02/2024 10.0     Correct Calcium 05/02/2024 9.5     AST(SGOT) 05/02/2024 22     ALT(SGPT) 05/02/2024 14     Alkaline Phosphatase 05/02/2024 68     Total Bilirubin 05/02/2024 0.5     Albumin 05/02/2024 4.6     Total Protein 05/02/2024 7.0     Globulin 05/02/2024 2.4     A-G Ratio 05/02/2024 1.9     25-Hydroxy   Vitamin D 25 05/02/2024 43      Glycohemoglobin 05/02/2024 6.1 (H)     Est Avg Glucose 05/02/2024 128     Cholesterol,Tot 05/02/2024 164     Triglycerides 05/02/2024 113     HDL 05/02/2024 56     LDL 05/02/2024 85     TSH 05/02/2024 1.970     WBC 05/02/2024 6.4     RBC 05/02/2024 5.11     Hemoglobin 05/02/2024 15.5     Hematocrit 05/02/2024 49.0 (H)     MCV 05/02/2024 95.9     MCH 05/02/2024 30.3     MCHC 05/02/2024 31.6 (L)     RDW 05/02/2024 42.5     Platelet Count 05/02/2024 369     MPV 05/02/2024 10.2     Neutrophils-Polys 05/02/2024 49.40     Lymphocytes 05/02/2024 38.70     Monocytes 05/02/2024 7.50     Eosinophils 05/02/2024 3.60     Basophils 05/02/2024 0.30     Immature Granulocytes 05/02/2024 0.50     Nucleated RBC 05/02/2024 0.00     Neutrophils (Absolute) 05/02/2024 3.18     Lymphs (Absolute) 05/02/2024 2.49     Monos (Absolute) 05/02/2024 0.48     Eos (Absolute) 05/02/2024 0.23     Baso (Absolute) 05/02/2024 0.02     Immature Granulocytes (a* 05/02/2024 0.03     NRBC (Absolute) 05/02/2024 0.00     Fasting Status 05/02/2024 Fasting     GFR (CKD-EPI) 05/02/2024 92    Hospital Outpatient Visit on 04/22/2024   Component Date Value    Significant Indicator 04/22/2024 POS (POS)     Source 04/22/2024 UR     Site 04/22/2024 Clean Catch     Culture Result 04/22/2024 Usual urogenital jovanna 10-50,000 cfu/mL (A)     Culture Result 04/22/2024  (A)                     Value:Escherichia coli  >100,000 cfu/mL        Lab Results   Component Value Date/Time    HBA1C 6.1 (H) 05/02/2024 08:28 AM    HBA1C 5.9 (H) 10/12/2023 08:26 AM     Lab Results   Component Value Date/Time    SODIUM 141 05/02/2024 08:28 AM    POTASSIUM 4.6 05/02/2024 08:28 AM    CHLORIDE 105 05/02/2024 08:28 AM    CO2 24 05/02/2024 08:28 AM    GLUCOSE 92 05/02/2024 08:28 AM    BUN 14 05/02/2024 08:28 AM    CREATININE 0.56 05/02/2024 08:28 AM    CREATININE 0.7 08/17/2006 08:15 AM    ALKPHOSPHAT 68 05/02/2024 08:28 AM    ASTSGOT 22 05/02/2024 08:28 AM    ALTSGPT 14 05/02/2024 08:28 AM     "TBILIRUBIN 0.5 05/02/2024 08:28 AM     Lab Results   Component Value Date/Time    INR 1.03 12/07/2020 09:10 AM     Lab Results   Component Value Date/Time    CHOLSTRLTOT 164 05/02/2024 08:28 AM    LDL 85 05/02/2024 08:28 AM    HDL 56 05/02/2024 08:28 AM    TRIGLYCERIDE 113 05/02/2024 08:28 AM       No results found for: \"TESTOSTERONE\"  No results found for: \"TSH\"  Lab Results   Component Value Date/Time    FREET4 0.80 04/26/2018 12:25 PM    FREET4 0.95 03/24/2014 07:48 AM     Lab Results   Component Value Date/Time    URICACID 3.4 05/14/2012 07:53 AM     No components found for: \"VITB12\"  Lab Results   Component Value Date/Time    25HYDROXY 43 05/02/2024 08:28 AM    25HYDROXY 44 10/12/2023 08:26 AM               HPI    Review of Systems   Constitutional: Negative.    HENT: Negative.     Eyes: Negative.    Respiratory: Negative.     Cardiovascular: Negative.    Gastrointestinal: Negative.    Genitourinary: Negative.    Musculoskeletal: Negative.    Skin: Negative.    Neurological: Negative.    Endo/Heme/Allergies: Negative.    Psychiatric/Behavioral: Negative.                Objective     /70 (BP Location: Right arm, Patient Position: Sitting, BP Cuff Size: Adult)   Pulse 69   Temp 36.7 °C (98.1 °F) (Temporal)   Ht 1.676 m (5' 6\")   Wt 66.2 kg (146 lb)   LMP 12/01/1976   SpO2 94%   BMI 23.57 kg/m²      Physical Exam  Vitals reviewed.   Constitutional:       General: She is not in acute distress.     Appearance: She is well-developed. She is not diaphoretic.   HENT:      Head: Normocephalic and atraumatic.      Right Ear: External ear normal.      Left Ear: External ear normal.      Nose: Nose normal.      Mouth/Throat:      Pharynx: No oropharyngeal exudate.   Eyes:      General: No scleral icterus.        Right eye: No discharge.         Left eye: No discharge.      Conjunctiva/sclera: Conjunctivae normal.      Pupils: Pupils are equal, round, and reactive to light.   Neck:      Thyroid: No thyromegaly. "      Vascular: No JVD.      Trachea: No tracheal deviation.   Cardiovascular:      Rate and Rhythm: Normal rate and regular rhythm.      Heart sounds: Normal heart sounds. No murmur heard.     No friction rub. No gallop.   Pulmonary:      Effort: Pulmonary effort is normal. No respiratory distress.      Breath sounds: Normal breath sounds. No stridor. No wheezing or rales.   Chest:      Chest wall: No tenderness.   Abdominal:      General: Bowel sounds are normal. There is no distension.      Palpations: Abdomen is soft. There is no mass.      Tenderness: There is no abdominal tenderness. There is no guarding or rebound.   Musculoskeletal:         General: No tenderness. Normal range of motion.      Cervical back: Normal range of motion and neck supple.   Lymphadenopathy:      Cervical: No cervical adenopathy.   Skin:     General: Skin is warm and dry.      Coloration: Skin is not pale.      Findings: No erythema or rash.   Neurological:      Mental Status: She is alert and oriented to person, place, and time.      Cranial Nerves: No cranial nerve deficit.      Motor: No abnormal muscle tone.      Coordination: Coordination normal.      Deep Tendon Reflexes: Reflexes are normal and symmetric. Reflexes normal.   Psychiatric:         Behavior: Behavior normal.         Thought Content: Thought content normal.         Judgment: Judgment normal.                             Assessment & Plan        1. Opiate dependence, continuous (HCC)- pain mgt  Under good control.  Will continue follow-up with pain management.  Norco 7.5, 4 times daily as needed     2. Mixed hyperlipidemia  Good control continue current regimen  - rosuvastatin (CRESTOR) 10 MG Tab; Take 1 Tablet by mouth every evening.  Dispense: 90 Tablet; Refill: 4  - - fenofibrate micronized (LOFIBRA) 134 MG capsule; TAKE ONE CAPSULE BY MOUTH EVERY MORNING BEFORE BREAKFAST  Dispense: 90 Capsule; Refill: 4  - TSH; Future  - CBC WITH DIFFERENTIAL; Future  - Comp  Metabolic Panel; Future  - Lipid Profile; Future    3. Primary insomnia  Good control continue current regimen.  Benadryl as needed sleep    4. Mild intermittent asthma without complication  Good control continue current regimen.  Albuterol inhaler as needed    5. Chronic pain syndrome- d/t ddd l/s and cervical spine- pain mgt- rx hc/apap qd and gabapentin  Under good control continue current regimen follow-up with pain management  - methocarbamol (ROBAXIN) 750 MG Tab; Take 1 Tablet by mouth 4 times a day.  Dispense: 120 Tablet; Refill: 3  - gabapentin (NEURONTIN) 100 MG Cap; Take 1 Capsule by mouth 4 times a day. Prescribed by pain management.  Dispense: 90 Capsule; Refill: 3    6. Abdominal cramps  Under good control continue current regimen  - dicyclomine (BENTYL) 10 MG Cap; TAKE ONE CAPSULE BY MOUTH TWO TIMES A DAY. before breakfast and dinner  Dispense: 180 Capsule; Refill: 4    7. Atrophic vaginitis  Good control continue follow-up with GYN for intravaginal estrogen.  No estrogen patches now  - estradiol (ESTRACE) 0.1 MG/GM vaginal cream; Insert 1 g into the vagina every 72 hours.    8. Vitamin D deficiency  Good control continue vitamin D3 but reduce dosage to 2000 units daily rather than 5000 today.  - VITAMIN D,25 HYDROXY (DEFICIENCY); Future    9. IFG (impaired fasting glucose)  Good control continue Mediterranean diet and exercise.  - HEMOGLOBIN A1C; Future

## 2024-05-09 ENCOUNTER — PATIENT OUTREACH (OUTPATIENT)
Dept: MEDICAL GROUP | Age: 79
End: 2024-05-09
Payer: MEDICARE

## 2024-05-09 ENCOUNTER — NURSE TRIAGE (OUTPATIENT)
Dept: MEDICAL GROUP | Age: 79
End: 2024-05-09
Payer: MEDICARE

## 2024-05-09 RX ORDER — NITROFURANTOIN 25; 75 MG/1; MG/1
100 CAPSULE ORAL 2 TIMES DAILY
Qty: 20 CAPSULE | Refills: 3 | Status: SHIPPED | OUTPATIENT
Start: 2024-05-09

## 2024-06-17 ENCOUNTER — HOSPITAL ENCOUNTER (OUTPATIENT)
Facility: MEDICAL CENTER | Age: 79
End: 2024-06-17
Attending: STUDENT IN AN ORGANIZED HEALTH CARE EDUCATION/TRAINING PROGRAM
Payer: MEDICARE

## 2024-06-17 PROCEDURE — 87186 SC STD MICRODIL/AGAR DIL: CPT | Mod: 91

## 2024-06-17 PROCEDURE — 87077 CULTURE AEROBIC IDENTIFY: CPT

## 2024-06-17 PROCEDURE — 87086 URINE CULTURE/COLONY COUNT: CPT

## 2024-06-20 ENCOUNTER — TELEPHONE (OUTPATIENT)
Dept: MEDICAL GROUP | Age: 79
End: 2024-06-20
Payer: MEDICARE

## 2024-06-20 DIAGNOSIS — N39.0 UTI DUE TO KLEBSIELLA SPECIES: ICD-10-CM

## 2024-06-20 DIAGNOSIS — B96.89 UTI DUE TO KLEBSIELLA SPECIES: ICD-10-CM

## 2024-06-20 DIAGNOSIS — B95.2 ENTEROCOCCUS UTI: ICD-10-CM

## 2024-06-20 DIAGNOSIS — N39.0 ENTEROCOCCUS UTI: ICD-10-CM

## 2024-06-20 RX ORDER — AMOXICILLIN AND CLAVULANATE POTASSIUM 875; 125 MG/1; MG/1
1 TABLET, FILM COATED ORAL 2 TIMES DAILY
Qty: 20 TABLET | Refills: 1 | Status: SHIPPED | OUTPATIENT
Start: 2024-06-20

## 2024-06-20 NOTE — TELEPHONE ENCOUNTER
Called patient on her urine culture results and let her know that Dr Díaz had sent in antibiotics for her , she was a little confused because she was waiting on a call from her urologist for medication as well because she had just called them . I did let her know that Dr Díaz had already sent it and that if they didn't call her back she already had medication waiting for her . Patient verbalized understanding

## 2024-09-30 ENCOUNTER — TELEPHONE (OUTPATIENT)
Dept: MEDICAL GROUP | Age: 79
End: 2024-09-30
Payer: MEDICARE

## 2024-09-30 DIAGNOSIS — G89.4 CHRONIC PAIN SYNDROME: ICD-10-CM

## 2024-09-30 RX ORDER — GABAPENTIN 100 MG/1
100 CAPSULE ORAL 4 TIMES DAILY
Status: SHIPPED
Start: 2024-09-30

## 2024-10-08 ENCOUNTER — HOSPITAL ENCOUNTER (OUTPATIENT)
Facility: MEDICAL CENTER | Age: 79
End: 2024-10-08
Attending: STUDENT IN AN ORGANIZED HEALTH CARE EDUCATION/TRAINING PROGRAM
Payer: MEDICARE

## 2024-10-08 PROCEDURE — 87186 SC STD MICRODIL/AGAR DIL: CPT

## 2024-10-08 PROCEDURE — 87077 CULTURE AEROBIC IDENTIFY: CPT

## 2024-10-08 PROCEDURE — 87086 URINE CULTURE/COLONY COUNT: CPT

## 2024-11-30 ENCOUNTER — HOSPITAL ENCOUNTER (OUTPATIENT)
Dept: LAB | Facility: MEDICAL CENTER | Age: 79
End: 2024-11-30
Attending: INTERNAL MEDICINE
Payer: MEDICARE

## 2024-11-30 DIAGNOSIS — R73.01 IFG (IMPAIRED FASTING GLUCOSE): ICD-10-CM

## 2024-11-30 DIAGNOSIS — E78.2 MIXED HYPERLIPIDEMIA: ICD-10-CM

## 2024-11-30 DIAGNOSIS — E55.9 VITAMIN D DEFICIENCY: ICD-10-CM

## 2024-11-30 LAB
25(OH)D3 SERPL-MCNC: 44 NG/ML (ref 30–100)
ALBUMIN SERPL BCP-MCNC: 4.5 G/DL (ref 3.2–4.9)
ALBUMIN/GLOB SERPL: 1.9 G/DL
ALP SERPL-CCNC: 64 U/L (ref 30–99)
ALT SERPL-CCNC: 11 U/L (ref 2–50)
ANION GAP SERPL CALC-SCNC: 11 MMOL/L (ref 7–16)
AST SERPL-CCNC: 20 U/L (ref 12–45)
BASOPHILS # BLD AUTO: 0.5 % (ref 0–1.8)
BASOPHILS # BLD: 0.03 K/UL (ref 0–0.12)
BILIRUB SERPL-MCNC: 0.4 MG/DL (ref 0.1–1.5)
BUN SERPL-MCNC: 12 MG/DL (ref 8–22)
CALCIUM ALBUM COR SERPL-MCNC: 8.7 MG/DL (ref 8.5–10.5)
CALCIUM SERPL-MCNC: 9.1 MG/DL (ref 8.5–10.5)
CHLORIDE SERPL-SCNC: 105 MMOL/L (ref 96–112)
CHOLEST SERPL-MCNC: 145 MG/DL (ref 100–199)
CO2 SERPL-SCNC: 27 MMOL/L (ref 20–33)
CREAT SERPL-MCNC: 0.6 MG/DL (ref 0.5–1.4)
EOSINOPHIL # BLD AUTO: 0.21 K/UL (ref 0–0.51)
EOSINOPHIL NFR BLD: 3.6 % (ref 0–6.9)
ERYTHROCYTE [DISTWIDTH] IN BLOOD BY AUTOMATED COUNT: 42 FL (ref 35.9–50)
EST. AVERAGE GLUCOSE BLD GHB EST-MCNC: 117 MG/DL
GFR SERPLBLD CREATININE-BSD FMLA CKD-EPI: 90 ML/MIN/1.73 M 2
GLOBULIN SER CALC-MCNC: 2.4 G/DL (ref 1.9–3.5)
GLUCOSE SERPL-MCNC: 90 MG/DL (ref 65–99)
HBA1C MFR BLD: 5.7 % (ref 4–5.6)
HCT VFR BLD AUTO: 47.4 % (ref 37–47)
HDLC SERPL-MCNC: 51 MG/DL
HGB BLD-MCNC: 15.1 G/DL (ref 12–16)
IMM GRANULOCYTES # BLD AUTO: 0.01 K/UL (ref 0–0.11)
IMM GRANULOCYTES NFR BLD AUTO: 0.2 % (ref 0–0.9)
LDLC SERPL CALC-MCNC: 67 MG/DL
LYMPHOCYTES # BLD AUTO: 2.2 K/UL (ref 1–4.8)
LYMPHOCYTES NFR BLD: 37.7 % (ref 22–41)
MCH RBC QN AUTO: 30.3 PG (ref 27–33)
MCHC RBC AUTO-ENTMCNC: 31.9 G/DL (ref 32.2–35.5)
MCV RBC AUTO: 95 FL (ref 81.4–97.8)
MONOCYTES # BLD AUTO: 0.44 K/UL (ref 0–0.85)
MONOCYTES NFR BLD AUTO: 7.5 % (ref 0–13.4)
NEUTROPHILS # BLD AUTO: 2.94 K/UL (ref 1.82–7.42)
NEUTROPHILS NFR BLD: 50.5 % (ref 44–72)
NRBC # BLD AUTO: 0 K/UL
NRBC BLD-RTO: 0 /100 WBC (ref 0–0.2)
PLATELET # BLD AUTO: 334 K/UL (ref 164–446)
PMV BLD AUTO: 10.2 FL (ref 9–12.9)
POTASSIUM SERPL-SCNC: 3.6 MMOL/L (ref 3.6–5.5)
PROT SERPL-MCNC: 6.9 G/DL (ref 6–8.2)
RBC # BLD AUTO: 4.99 M/UL (ref 4.2–5.4)
SODIUM SERPL-SCNC: 143 MMOL/L (ref 135–145)
TRIGL SERPL-MCNC: 136 MG/DL (ref 0–149)
TSH SERPL-ACNC: 1.89 UIU/ML (ref 0.35–5.5)
WBC # BLD AUTO: 5.8 K/UL (ref 4.8–10.8)

## 2024-11-30 PROCEDURE — 36415 COLL VENOUS BLD VENIPUNCTURE: CPT | Mod: GA

## 2024-11-30 PROCEDURE — 80053 COMPREHEN METABOLIC PANEL: CPT

## 2024-11-30 PROCEDURE — 85025 COMPLETE CBC W/AUTO DIFF WBC: CPT

## 2024-11-30 PROCEDURE — 83036 HEMOGLOBIN GLYCOSYLATED A1C: CPT | Mod: GA

## 2024-11-30 PROCEDURE — 82306 VITAMIN D 25 HYDROXY: CPT

## 2024-11-30 PROCEDURE — 84443 ASSAY THYROID STIM HORMONE: CPT

## 2024-11-30 PROCEDURE — 80061 LIPID PANEL: CPT

## 2024-12-03 ENCOUNTER — APPOINTMENT (OUTPATIENT)
Dept: MEDICAL GROUP | Age: 79
End: 2024-12-03
Payer: MEDICARE

## 2024-12-03 VITALS
BODY MASS INDEX: 23.78 KG/M2 | DIASTOLIC BLOOD PRESSURE: 70 MMHG | HEIGHT: 66 IN | WEIGHT: 148 LBS | TEMPERATURE: 97.5 F | SYSTOLIC BLOOD PRESSURE: 124 MMHG | HEART RATE: 74 BPM | OXYGEN SATURATION: 95 %

## 2024-12-03 DIAGNOSIS — R10.9 ABDOMINAL CRAMPS: ICD-10-CM

## 2024-12-03 DIAGNOSIS — N95.1 MENOPAUSAL STATE: ICD-10-CM

## 2024-12-03 DIAGNOSIS — E55.9 VITAMIN D DEFICIENCY: ICD-10-CM

## 2024-12-03 DIAGNOSIS — B96.20 E. COLI UTI: ICD-10-CM

## 2024-12-03 DIAGNOSIS — N39.0 E. COLI UTI: ICD-10-CM

## 2024-12-03 DIAGNOSIS — G89.4 CHRONIC PAIN SYNDROME: ICD-10-CM

## 2024-12-03 DIAGNOSIS — Z78.0 POSTMENOPAUSAL STATUS (AGE-RELATED) (NATURAL): ICD-10-CM

## 2024-12-03 DIAGNOSIS — N32.81 OAB (OVERACTIVE BLADDER): ICD-10-CM

## 2024-12-03 DIAGNOSIS — E78.2 MIXED HYPERLIPIDEMIA: ICD-10-CM

## 2024-12-03 DIAGNOSIS — K21.00 GASTROESOPHAGEAL REFLUX DISEASE WITH ESOPHAGITIS WITHOUT HEMORRHAGE: ICD-10-CM

## 2024-12-03 PROCEDURE — 99214 OFFICE O/P EST MOD 30 MIN: CPT | Performed by: INTERNAL MEDICINE

## 2024-12-03 PROCEDURE — 3078F DIAST BP <80 MM HG: CPT | Performed by: INTERNAL MEDICINE

## 2024-12-03 PROCEDURE — 3074F SYST BP LT 130 MM HG: CPT | Performed by: INTERNAL MEDICINE

## 2024-12-03 RX ORDER — DICYCLOMINE HYDROCHLORIDE 10 MG/1
CAPSULE ORAL
Qty: 180 CAPSULE | Refills: 4 | Status: SHIPPED | OUTPATIENT
Start: 2024-12-03 | End: 2024-12-03 | Stop reason: SDUPTHER

## 2024-12-03 RX ORDER — ROSUVASTATIN CALCIUM 10 MG/1
10 TABLET, COATED ORAL EVERY EVENING
Qty: 90 TABLET | Refills: 4 | Status: SHIPPED | OUTPATIENT
Start: 2024-12-03

## 2024-12-03 RX ORDER — OMEPRAZOLE 40 MG/1
40 CAPSULE, DELAYED RELEASE ORAL
Qty: 100 CAPSULE | Refills: 2 | Status: SHIPPED | OUTPATIENT
Start: 2024-12-03

## 2024-12-03 RX ORDER — DICYCLOMINE HYDROCHLORIDE 10 MG/1
CAPSULE ORAL
Qty: 180 CAPSULE | Refills: 4 | Status: SHIPPED | OUTPATIENT
Start: 2024-12-03

## 2024-12-03 RX ORDER — FENOFIBRATE 134 MG/1
CAPSULE ORAL
Qty: 90 CAPSULE | Refills: 4 | Status: SHIPPED | OUTPATIENT
Start: 2024-12-03

## 2024-12-03 RX ORDER — MIRABEGRON 50 MG/1
50 TABLET, FILM COATED, EXTENDED RELEASE ORAL DAILY
Qty: 90 TABLET | Refills: 3 | Status: SHIPPED
Start: 2024-12-03

## 2024-12-03 RX ORDER — NITROFURANTOIN 25; 75 MG/1; MG/1
100 CAPSULE ORAL 2 TIMES DAILY PRN
Status: SHIPPED
Start: 2024-12-03

## 2024-12-03 RX ORDER — GABAPENTIN 100 MG/1
200 CAPSULE ORAL
Status: SHIPPED
Start: 2024-12-03

## 2024-12-03 ASSESSMENT — FIBROSIS 4 INDEX: FIB4 SCORE: 1.46

## 2024-12-04 NOTE — PROGRESS NOTES
.hpisexc    History of Present Illness  The patient is an 79-year-old female who presents for a medication refill.    She is currently on a regimen of gabapentin, taking two 100 mg tablets three times daily, totaling six tablets per day. This medication is managed by her pain management specialist at [facility]. However, due to a pharmacy error, she was unable to refill her prescription. For pain management, she also takes hydrocodone 10 mg with acetaminophen four times daily.    She has a history of recurrent bladder infections and is under the care of Dr. Burns at Urology. She was previously on a low dose of nitrofurantoin but discontinued it due to a yeast infection. She is also prescribed Myrbetriq 50 mg daily by her gynecologist, Dr. Willoughby.    For indigestion, she takes omeprazole 40 mg as needed, which she reports as effective. She does not have gastroesophageal reflux disease (GERD) or irritable bowel syndrome but does experience indigestion. She is also on dicyclomine, which she finds helpful.    Patient Active Problem List   Diagnosis    Mixed hyperlipidemia    DDD (degenerative disc disease), lumbar    DDD (degenerative disc disease), cervical    Menopausal and postmenopausal disorder    Vitamin D deficiency disease    COPD (chronic obstructive pulmonary disease) (HCC)    Chronic allergic rhinitis    Bladder disorder- OAB -dr. willoughby (gyn)    Primary insomnia    Chronic pain syndrome- d/t ddd l/s and cervical spine- pain mgt- rx hc/apap qd and gabapentin    Opiate dependence, continuous (HCC)- pain mgt    Mild intermittent asthma without complication     Outpatient Medications Prior to Visit   Medication Sig Dispense Refill    gabapentin (NEURONTIN) 100 MG Cap Take 1 Capsule by mouth 4 times a day. Prescribed by pain management.      amoxicillin-clavulanate (AUGMENTIN) 875-125 MG Tab Take 1 Tablet by mouth 2 times a day. 20 Tablet 1    nitrofurantoin (MACROBID) 100 MG Cap Take 1 Capsule by  mouth 2 times a day. 20 Capsule 3    methocarbamol (ROBAXIN) 750 MG Tab Take 1 Tablet by mouth 4 times a day. 120 Tablet 3    estradiol (ESTRACE) 0.1 MG/GM vaginal cream Insert 1 g into the vagina every 72 hours.      omeprazole (PRILOSEC) 40 MG delayed-release capsule Take 1 Capsule by mouth every day. 100 Capsule 3    rosuvastatin (CRESTOR) 10 MG Tab Take 1 Tablet by mouth every evening. 90 Tablet 4    fenofibrate micronized (LOFIBRA) 134 MG capsule TAKE ONE CAPSULE BY MOUTH EVERY MORNING BEFORE BREAKFAST 90 Capsule 4    dicyclomine (BENTYL) 10 MG Cap TAKE ONE CAPSULE BY MOUTH TWO TIMES A DAY. before breakfast and dinner 180 Capsule 4    Mirabegron ER (MYRBETRIQ) 50 MG TABLET SR 24 HR Take 50 mg by mouth every day. 90 Tablet 3    HYDROcodone/acetaminophen (NORCO)  MG Tab TK 1 T PO Q 4-6 H PRN .DNFU 07/23       No facility-administered medications prior to visit.     Hospital Outpatient Visit on 11/30/2024   Component Date Value    Glycohemoglobin 11/30/2024 5.7 (H)     Est Avg Glucose 11/30/2024 117     25-Hydroxy   Vitamin D 25 11/30/2024 44     Cholesterol,Tot 11/30/2024 145     Triglycerides 11/30/2024 136     HDL 11/30/2024 51     LDL 11/30/2024 67     Sodium 11/30/2024 143     Potassium 11/30/2024 3.6     Chloride 11/30/2024 105     Co2 11/30/2024 27     Anion Gap 11/30/2024 11.0     Glucose 11/30/2024 90     Bun 11/30/2024 12     Creatinine 11/30/2024 0.60     Calcium 11/30/2024 9.1     Correct Calcium 11/30/2024 8.7     AST(SGOT) 11/30/2024 20     ALT(SGPT) 11/30/2024 11     Alkaline Phosphatase 11/30/2024 64     Total Bilirubin 11/30/2024 0.4     Albumin 11/30/2024 4.5     Total Protein 11/30/2024 6.9     Globulin 11/30/2024 2.4     A-G Ratio 11/30/2024 1.9     WBC 11/30/2024 5.8     RBC 11/30/2024 4.99     Hemoglobin 11/30/2024 15.1     Hematocrit 11/30/2024 47.4 (H)     MCV 11/30/2024 95.0     MCH 11/30/2024 30.3     MCHC 11/30/2024 31.9 (L)     RDW 11/30/2024 42.0     Platelet Count 11/30/2024 334      MPV 11/30/2024 10.2     Neutrophils-Polys 11/30/2024 50.50     Lymphocytes 11/30/2024 37.70     Monocytes 11/30/2024 7.50     Eosinophils 11/30/2024 3.60     Basophils 11/30/2024 0.50     Immature Granulocytes 11/30/2024 0.20     Nucleated RBC 11/30/2024 0.00     Neutrophils (Absolute) 11/30/2024 2.94     Lymphs (Absolute) 11/30/2024 2.20     Monos (Absolute) 11/30/2024 0.44     Eos (Absolute) 11/30/2024 0.21     Baso (Absolute) 11/30/2024 0.03     Immature Granulocytes (a* 11/30/2024 0.01     NRBC (Absolute) 11/30/2024 0.00     TSH 11/30/2024 1.890     GFR (CKD-EPI) 11/30/2024 90     '  [facility] Outpatient Visit on 11/30/2024   Component Date Value    Glycohemoglobin 11/30/2024 5.7 (H)     Est Avg Glucose 11/30/2024 117     25-Hydroxy   Vitamin D 25 11/30/2024 44     Cholesterol,Tot 11/30/2024 145     Triglycerides 11/30/2024 136     HDL 11/30/2024 51     LDL 11/30/2024 67     Sodium 11/30/2024 143     Potassium 11/30/2024 3.6     Chloride 11/30/2024 105     Co2 11/30/2024 27     Anion Gap 11/30/2024 11.0     Glucose 11/30/2024 90     Bun 11/30/2024 12     Creatinine 11/30/2024 0.60     Calcium 11/30/2024 9.1     Correct Calcium 11/30/2024 8.7     AST(SGOT) 11/30/2024 20     ALT(SGPT) 11/30/2024 11     Alkaline Phosphatase 11/30/2024 64     Total Bilirubin 11/30/2024 0.4     Albumin 11/30/2024 4.5     Total Protein 11/30/2024 6.9     Globulin 11/30/2024 2.4     A-G Ratio 11/30/2024 1.9     WBC 11/30/2024 5.8     RBC 11/30/2024 4.99     Hemoglobin 11/30/2024 15.1     Hematocrit 11/30/2024 47.4 (H)     MCV 11/30/2024 95.0     MCH 11/30/2024 30.3     MCHC 11/30/2024 31.9 (L)     RDW 11/30/2024 42.0     Platelet Count 11/30/2024 334     MPV 11/30/2024 10.2     Neutrophils-Polys 11/30/2024 50.50     Lymphocytes 11/30/2024 37.70     Monocytes 11/30/2024 7.50     Eosinophils 11/30/2024 3.60     Basophils 11/30/2024 0.50     Immature Granulocytes 11/30/2024 0.20     Nucleated RBC 11/30/2024 0.00     Neutrophils  (Absolute) 11/30/2024 2.94     Lymphs (Absolute) 11/30/2024 2.20     Monos (Absolute) 11/30/2024 0.44     Eos (Absolute) 11/30/2024 0.21     Baso (Absolute) 11/30/2024 0.03     Immature Granulocytes (a* 11/30/2024 0.01     NRBC (Absolute) 11/30/2024 0.00     TSH 11/30/2024 1.890     GFR (CKD-EPI) 11/30/2024 90     '  Physical Exam  Normal heart lungs abdomen extremities      Assessment & Plan  1. Medication Management.  Her recent lab results were satisfactory, with cholesterol levels within the normal range and an A1c of 5.7. She was advised to inform the pharmacy to contact the appropriate physician for her gabapentin prescription. Her current medications, including rosuvastatin, fenofibrate, and omeprazole, will be continued. Omeprazole 40 mg was refilled for her GERD, rosuvastatin 10 mg daily for cholesterol management, and fenofibrate 134 mg daily for cholesterol control. Dicyclomine was also reordered. She was instructed to contact the urology office for nitrofurantoin as needed for bladder infections. Lab orders for CBC, chem panel, lipid panel, and vitamin D were placed.      1. Postmenopausal status (age-related) (natural)     - DS-BONE DENSITY STUDY (DEXA); Future    2. Menopausal state     - DS-BONE DENSITY STUDY (DEXA); Future    3. Mixed hyperlipidemia   Under good control. Continue same regimen.'  - fenofibrate micronized (LOFIBRA) 134 MG capsule; TAKE ONE CAPSULE BY MOUTH EVERY MORNING BEFORE BREAKFAST  Dispense: 90 Capsule; Refill: 4  - rosuvastatin (CRESTOR) 10 MG Tab; Take 1 Tablet by mouth every evening.  Dispense: 90 Tablet; Refill: 4  - Comp Metabolic Panel; Future  - CBC WITH DIFFERENTIAL; Future  - TSH; Future  - Lipid Profile; Future    4. Abdominal cramps   Under good control. Continue same regimen.'  - dicyclomine (BENTYL) 10 MG Cap; TAKE ONE CAPSULE BY MOUTH TWO TIMES A DAY. before breakfast and dinner  Dispense: 180 Capsule; Refill: 4    5. Chronic pain syndrome- d/t ddd l/s and cervical  spine- pain mgt- rx hc/apap qd and gabapentin   Under good control. Continue same regimen.'  - gabapentin (NEURONTIN) 100 MG Cap; Take 2 Capsules by mouth 3 times a day. Prescribed by pain management.    6. OAB (overactive bladder)   Under good control. Continue same regimen.'  - Mirabegron ER (MYRBETRIQ) 50 MG TABLET SR 24 HR; Take 50 mg by mouth every day.  Dispense: 90 Tablet; Refill: 3    7. Gastroesophageal reflux disease with esophagitis without hemorrhage   Under good control. Continue same regimen.'  - omeprazole (PRILOSEC) 40 MG delayed-release capsule; Take 1 Capsule by mouth 1 time a day as needed (abd pain/ gerd).  Dispense: 100 Capsule; Refill: 2    8. E. coli UTI    Under good control. Continue same regimen.'  This is intermittent and will take Magic ointment on a as needed basis from urology.  - nitrofurantoin (MACROBID) 100 MG Cap; Take 1 Capsule by mouth 2 times a day as needed (uti).    9. Vitamin D deficiency      Under good control. Continue same regimen.'  - VITAMIN D,25 HYDROXY (DEFICIENCY); Future    Follow-up  Return in 6 months for follow up.

## 2024-12-07 ENCOUNTER — HOSPITAL ENCOUNTER (OUTPATIENT)
Dept: RADIOLOGY | Facility: MEDICAL CENTER | Age: 79
End: 2024-12-07
Attending: INTERNAL MEDICINE
Payer: MEDICARE

## 2024-12-07 DIAGNOSIS — N95.1 MENOPAUSAL STATE: ICD-10-CM

## 2024-12-07 DIAGNOSIS — Z78.0 POSTMENOPAUSAL STATUS (AGE-RELATED) (NATURAL): ICD-10-CM

## 2024-12-07 PROCEDURE — 77080 DXA BONE DENSITY AXIAL: CPT

## 2025-02-11 ENCOUNTER — HOSPITAL ENCOUNTER (OUTPATIENT)
Facility: MEDICAL CENTER | Age: 80
End: 2025-02-11
Attending: STUDENT IN AN ORGANIZED HEALTH CARE EDUCATION/TRAINING PROGRAM
Payer: MEDICARE

## 2025-02-11 PROCEDURE — 87086 URINE CULTURE/COLONY COUNT: CPT

## 2025-02-14 LAB
BACTERIA UR CULT: NORMAL
SIGNIFICANT IND 70042: NORMAL
SITE SITE: NORMAL
SOURCE SOURCE: NORMAL

## 2025-03-05 ENCOUNTER — APPOINTMENT (OUTPATIENT)
Dept: URBAN - METROPOLITAN AREA CLINIC 22 | Facility: CLINIC | Age: 80
Setting detail: DERMATOLOGY
End: 2025-03-05

## 2025-03-05 DIAGNOSIS — Z71.89 OTHER SPECIFIED COUNSELING: ICD-10-CM

## 2025-03-05 DIAGNOSIS — L82.1 OTHER SEBORRHEIC KERATOSIS: ICD-10-CM

## 2025-03-05 DIAGNOSIS — D18.0 HEMANGIOMA: ICD-10-CM

## 2025-03-05 DIAGNOSIS — L82.0 INFLAMED SEBORRHEIC KERATOSIS: ICD-10-CM

## 2025-03-05 DIAGNOSIS — Z85.828 PERSONAL HISTORY OF OTHER MALIGNANT NEOPLASM OF SKIN: ICD-10-CM

## 2025-03-05 DIAGNOSIS — D22 MELANOCYTIC NEVI: ICD-10-CM

## 2025-03-05 DIAGNOSIS — L57.0 ACTINIC KERATOSIS: ICD-10-CM

## 2025-03-05 DIAGNOSIS — L81.4 OTHER MELANIN HYPERPIGMENTATION: ICD-10-CM

## 2025-03-05 PROBLEM — D22.5 MELANOCYTIC NEVI OF TRUNK: Status: ACTIVE | Noted: 2025-03-05

## 2025-03-05 PROBLEM — D18.01 HEMANGIOMA OF SKIN AND SUBCUTANEOUS TISSUE: Status: ACTIVE | Noted: 2025-03-05

## 2025-03-05 PROCEDURE — 17110 DESTRUCTION B9 LES UP TO 14: CPT

## 2025-03-05 PROCEDURE — ? SUNSCREEN RECOMMENDATIONS

## 2025-03-05 PROCEDURE — ? PRESCRIPTION

## 2025-03-05 PROCEDURE — ? LIQUID NITROGEN

## 2025-03-05 PROCEDURE — 17003 DESTRUCT PREMALG LES 2-14: CPT | Mod: 59

## 2025-03-05 PROCEDURE — ? ADDITIONAL NOTES

## 2025-03-05 PROCEDURE — 17000 DESTRUCT PREMALG LESION: CPT | Mod: 59

## 2025-03-05 PROCEDURE — ? COUNSELING

## 2025-03-05 PROCEDURE — 99213 OFFICE O/P EST LOW 20 MIN: CPT | Mod: 25

## 2025-03-05 RX ORDER — HYDROQUINONE 40 MG/G
CREAM TOPICAL QD
Qty: 28.4 | Refills: 1 | Status: ERX

## 2025-03-05 ASSESSMENT — LOCATION SIMPLE DESCRIPTION DERM
LOCATION SIMPLE: ABDOMEN
LOCATION SIMPLE: RIGHT EAR
LOCATION SIMPLE: LEFT FOREARM
LOCATION SIMPLE: LEFT UPPER ARM
LOCATION SIMPLE: RIGHT UPPER BACK
LOCATION SIMPLE: RIGHT CALF
LOCATION SIMPLE: LEFT CALF
LOCATION SIMPLE: LEFT POSTERIOR UPPER ARM
LOCATION SIMPLE: LEFT CHEEK
LOCATION SIMPLE: LEFT UPPER BACK
LOCATION SIMPLE: LEFT ANTERIOR NECK
LOCATION SIMPLE: LEFT HAND
LOCATION SIMPLE: RIGHT ANTERIOR NECK
LOCATION SIMPLE: NOSE

## 2025-03-05 ASSESSMENT — LOCATION DETAILED DESCRIPTION DERM
LOCATION DETAILED: LEFT PROXIMAL DORSAL FOREARM
LOCATION DETAILED: LEFT INFERIOR UPPER BACK
LOCATION DETAILED: RIGHT DISTAL CALF
LOCATION DETAILED: RIGHT MID-UPPER BACK
LOCATION DETAILED: NASAL ROOT
LOCATION DETAILED: LEFT INFERIOR LATERAL NECK
LOCATION DETAILED: LEFT CENTRAL MALAR CHEEK
LOCATION DETAILED: RIGHT CAVUM CONCHA
LOCATION DETAILED: LEFT PROXIMAL CALF
LOCATION DETAILED: LEFT PROXIMAL POSTERIOR UPPER ARM
LOCATION DETAILED: LEFT DISTAL DORSAL FOREARM
LOCATION DETAILED: RIGHT INFERIOR CRUS OF ANTIHELIX
LOCATION DETAILED: RIGHT INFERIOR LATERAL NECK
LOCATION DETAILED: LEFT RADIAL DORSAL HAND
LOCATION DETAILED: LEFT LATERAL ABDOMEN
LOCATION DETAILED: LEFT ANTERIOR PROXIMAL UPPER ARM

## 2025-03-05 ASSESSMENT — LOCATION ZONE DERM
LOCATION ZONE: LEG
LOCATION ZONE: EAR
LOCATION ZONE: NECK
LOCATION ZONE: NOSE
LOCATION ZONE: FACE
LOCATION ZONE: TRUNK
LOCATION ZONE: HAND
LOCATION ZONE: ARM

## 2025-03-05 NOTE — PROCEDURE: LIQUID NITROGEN
Show Aperture Variable?: Yes
Render Post-Care Instructions In Note?: no
Detail Level: Detailed
Consent: The patient's consent was obtained including but not limited to risks of crusting, scabbing, blistering, scarring, darker or lighter pigmentary change, recurrence, incomplete removal and infection.
Duration Of Freeze Thaw-Cycle (Seconds): 3
Post-Care Instructions: I reviewed with the patient in detail post-care instructions. Patient is to wear sunprotection, and avoid picking at any of the treated lesions. Pt may apply Vaseline to crusted or scabbing areas.
Number Of Freeze-Thaw Cycles: 2 freeze-thaw cycles
Consent: The patient's mom’s consent was obtained including but not limited to risks of crusting, scabbing, blistering, scarring, darker or lighter pigmentary change, recurrence, incomplete removal and infection.
Spray Paint Text: The liquid nitrogen was applied to the skin utilizing a spray paint frosting technique.
Medical Necessity Information: It is in your best interest to select a reason for this procedure from the list below. All of these items fulfill various CMS LCD requirements except the new and changing color options.
Medical Necessity Clause: This procedure was medically necessary because the lesions that were treated were:

## 2025-03-05 NOTE — HPI: FULL BODY SKIN EXAMINATION
How Severe Are Your Spot(S)?: mild
What Type Of Note Output Would You Prefer (Optional)?: Bullet Format
What Is The Reason For Today's Visit?: Full Body Skin Examination
What Is The Reason For Today's Visit? (Being Monitored For X): concerning skin lesions on an annual basis
n/a

## 2025-06-04 ENCOUNTER — HOSPITAL ENCOUNTER (OUTPATIENT)
Dept: LAB | Facility: MEDICAL CENTER | Age: 80
End: 2025-06-04
Attending: INTERNAL MEDICINE
Payer: MEDICARE

## 2025-06-04 DIAGNOSIS — E55.9 VITAMIN D DEFICIENCY: ICD-10-CM

## 2025-06-04 DIAGNOSIS — E78.2 MIXED HYPERLIPIDEMIA: ICD-10-CM

## 2025-06-04 LAB
BASOPHILS # BLD AUTO: 0.7 % (ref 0–1.8)
BASOPHILS # BLD: 0.04 K/UL (ref 0–0.12)
EOSINOPHIL # BLD AUTO: 0.21 K/UL (ref 0–0.51)
EOSINOPHIL NFR BLD: 3.5 % (ref 0–6.9)
ERYTHROCYTE [DISTWIDTH] IN BLOOD BY AUTOMATED COUNT: 42.1 FL (ref 35.9–50)
HCT VFR BLD AUTO: 48.7 % (ref 37–47)
HGB BLD-MCNC: 15.4 G/DL (ref 12–16)
IMM GRANULOCYTES # BLD AUTO: 0.02 K/UL (ref 0–0.11)
IMM GRANULOCYTES NFR BLD AUTO: 0.3 % (ref 0–0.9)
LYMPHOCYTES # BLD AUTO: 2.65 K/UL (ref 1–4.8)
LYMPHOCYTES NFR BLD: 44.5 % (ref 22–41)
MCH RBC QN AUTO: 30.1 PG (ref 27–33)
MCHC RBC AUTO-ENTMCNC: 31.6 G/DL (ref 32.2–35.5)
MCV RBC AUTO: 95.1 FL (ref 81.4–97.8)
MONOCYTES # BLD AUTO: 0.48 K/UL (ref 0–0.85)
MONOCYTES NFR BLD AUTO: 8.1 % (ref 0–13.4)
NEUTROPHILS # BLD AUTO: 2.55 K/UL (ref 1.82–7.42)
NEUTROPHILS NFR BLD: 42.9 % (ref 44–72)
NRBC # BLD AUTO: 0 K/UL
NRBC BLD-RTO: 0 /100 WBC (ref 0–0.2)
PLATELET # BLD AUTO: 342 K/UL (ref 164–446)
PMV BLD AUTO: 10.3 FL (ref 9–12.9)
RBC # BLD AUTO: 5.12 M/UL (ref 4.2–5.4)
WBC # BLD AUTO: 6 K/UL (ref 4.8–10.8)

## 2025-06-04 PROCEDURE — 84443 ASSAY THYROID STIM HORMONE: CPT

## 2025-06-04 PROCEDURE — 85025 COMPLETE CBC W/AUTO DIFF WBC: CPT

## 2025-06-04 PROCEDURE — 80053 COMPREHEN METABOLIC PANEL: CPT

## 2025-06-04 PROCEDURE — 36415 COLL VENOUS BLD VENIPUNCTURE: CPT

## 2025-06-04 PROCEDURE — 80061 LIPID PANEL: CPT

## 2025-06-04 PROCEDURE — 82306 VITAMIN D 25 HYDROXY: CPT

## 2025-06-05 LAB
25(OH)D3 SERPL-MCNC: 45 NG/ML (ref 30–100)
ALBUMIN SERPL BCP-MCNC: 4.3 G/DL (ref 3.2–4.9)
ALBUMIN/GLOB SERPL: 1.6 G/DL
ALP SERPL-CCNC: 63 U/L (ref 30–99)
ALT SERPL-CCNC: 12 U/L (ref 2–50)
ANION GAP SERPL CALC-SCNC: 11 MMOL/L (ref 7–16)
AST SERPL-CCNC: 22 U/L (ref 12–45)
BILIRUB SERPL-MCNC: 0.4 MG/DL (ref 0.1–1.5)
BUN SERPL-MCNC: 14 MG/DL (ref 8–22)
CALCIUM ALBUM COR SERPL-MCNC: 9.6 MG/DL (ref 8.5–10.5)
CALCIUM SERPL-MCNC: 9.8 MG/DL (ref 8.5–10.5)
CHLORIDE SERPL-SCNC: 106 MMOL/L (ref 96–112)
CHOLEST SERPL-MCNC: 162 MG/DL (ref 100–199)
CO2 SERPL-SCNC: 23 MMOL/L (ref 20–33)
CREAT SERPL-MCNC: 0.8 MG/DL (ref 0.5–1.4)
FASTING STATUS PATIENT QL REPORTED: NORMAL
GFR SERPLBLD CREATININE-BSD FMLA CKD-EPI: 74 ML/MIN/1.73 M 2
GLOBULIN SER CALC-MCNC: 2.7 G/DL (ref 1.9–3.5)
GLUCOSE SERPL-MCNC: 93 MG/DL (ref 65–99)
HDLC SERPL-MCNC: 54 MG/DL
LDLC SERPL CALC-MCNC: 81 MG/DL
POTASSIUM SERPL-SCNC: 4.4 MMOL/L (ref 3.6–5.5)
PROT SERPL-MCNC: 7 G/DL (ref 6–8.2)
SODIUM SERPL-SCNC: 140 MMOL/L (ref 135–145)
TRIGL SERPL-MCNC: 137 MG/DL (ref 0–149)
TSH SERPL-ACNC: 1.97 UIU/ML (ref 0.38–5.33)

## 2025-06-12 ENCOUNTER — OFFICE VISIT (OUTPATIENT)
Dept: MEDICAL GROUP | Age: 80
End: 2025-06-12
Payer: MEDICARE

## 2025-06-12 VITALS
WEIGHT: 147 LBS | SYSTOLIC BLOOD PRESSURE: 124 MMHG | TEMPERATURE: 96.9 F | DIASTOLIC BLOOD PRESSURE: 62 MMHG | HEART RATE: 64 BPM | OXYGEN SATURATION: 96 % | HEIGHT: 66 IN | BODY MASS INDEX: 23.63 KG/M2

## 2025-06-12 DIAGNOSIS — G89.4 CHRONIC PAIN SYNDROME: ICD-10-CM

## 2025-06-12 DIAGNOSIS — K21.00 GASTROESOPHAGEAL REFLUX DISEASE WITH ESOPHAGITIS WITHOUT HEMORRHAGE: ICD-10-CM

## 2025-06-12 DIAGNOSIS — R10.9 ABDOMINAL CRAMPS: ICD-10-CM

## 2025-06-12 DIAGNOSIS — N95.2 ATROPHIC VAGINITIS: ICD-10-CM

## 2025-06-12 DIAGNOSIS — R73.01 IFG (IMPAIRED FASTING GLUCOSE): Primary | ICD-10-CM

## 2025-06-12 DIAGNOSIS — N32.81 OAB (OVERACTIVE BLADDER): ICD-10-CM

## 2025-06-12 DIAGNOSIS — E78.2 MIXED HYPERLIPIDEMIA: ICD-10-CM

## 2025-06-12 PROCEDURE — 3074F SYST BP LT 130 MM HG: CPT | Performed by: INTERNAL MEDICINE

## 2025-06-12 PROCEDURE — 3078F DIAST BP <80 MM HG: CPT | Performed by: INTERNAL MEDICINE

## 2025-06-12 PROCEDURE — 99214 OFFICE O/P EST MOD 30 MIN: CPT | Performed by: INTERNAL MEDICINE

## 2025-06-12 RX ORDER — ESTRADIOL 0.1 MG/G
1 CREAM VAGINAL
Qty: 42.5 G | Refills: 3 | Status: SHIPPED | OUTPATIENT
Start: 2025-06-12

## 2025-06-12 RX ORDER — MIRABEGRON 50 MG/1
50 TABLET, FILM COATED, EXTENDED RELEASE ORAL DAILY
Status: SHIPPED
Start: 2025-06-12

## 2025-06-12 RX ORDER — DICYCLOMINE HYDROCHLORIDE 10 MG/1
CAPSULE ORAL
Qty: 180 CAPSULE | Refills: 4 | Status: SHIPPED | OUTPATIENT
Start: 2025-06-12

## 2025-06-12 RX ORDER — ROSUVASTATIN CALCIUM 10 MG/1
10 TABLET, COATED ORAL EVERY EVENING
Qty: 100 TABLET | Refills: 2 | Status: SHIPPED | OUTPATIENT
Start: 2025-06-12

## 2025-06-12 RX ORDER — GABAPENTIN 100 MG/1
200 CAPSULE ORAL
Status: SHIPPED
Start: 2025-06-12

## 2025-06-12 RX ORDER — OMEPRAZOLE 40 MG/1
40 CAPSULE, DELAYED RELEASE ORAL
Qty: 100 CAPSULE | Refills: 2 | Status: SHIPPED | OUTPATIENT
Start: 2025-06-12

## 2025-06-12 ASSESSMENT — ENCOUNTER SYMPTOMS
RESPIRATORY NEGATIVE: 1
NEUROLOGICAL NEGATIVE: 1
PSYCHIATRIC NEGATIVE: 1
CARDIOVASCULAR NEGATIVE: 1
GASTROINTESTINAL NEGATIVE: 1
CONSTITUTIONAL NEGATIVE: 1
EYES NEGATIVE: 1
MUSCULOSKELETAL NEGATIVE: 1

## 2025-06-12 ASSESSMENT — FIBROSIS 4 INDEX: FIB4 SCORE: 1.52

## 2025-06-12 ASSESSMENT — PATIENT HEALTH QUESTIONNAIRE - PHQ9: CLINICAL INTERPRETATION OF PHQ2 SCORE: 0

## 2025-06-12 NOTE — ASSESSMENT & PLAN NOTE
Under good control.  That appears she does not need fenofibrate in addition to her statin.  Will limit the fenofibrate as her triglycerides have been normal except for 1 time in the past at 180.  If triglycerides increase on follow-up visit then we will increase rosuvastatin and hold off on the fenofibrate.  She has no side effects from the fenofibrate but there is always a risk of drug interaction with the statin and if we can eliminate this possibility we will do so.  Orders:    rosuvastatin (CRESTOR) 10 MG Tab; Take 1 Tablet by mouth every evening.    CBC WITH DIFFERENTIAL; Future    Comp Metabolic Panel; Future    TSH; Future    Lipid Profile; Future

## 2025-06-12 NOTE — PROGRESS NOTES
Subjective     Esthela Mart is a 80 y.o. female who presents with Follow-Up (Lab review )    History of Present Illness  History of Present Illness        The patient is an 80-year-old female who presents for evaluation of elevated blood pressure, cholesterol management, and medication management.    Elevated Blood Pressure  She reports experiencing elevated blood pressure, with a diastolic reading of 60, which she perceives as high. Her usual diastolic readings range between 17 and 20.  - Onset: Recent elevation in blood pressure.  - Location: Blood pressure readings.  - Character: Elevated diastolic reading of 60, usual range between 17 and 20.  - Severity: Perceived as high.    Cholesterol Management  She is currently on a regimen of two cholesterol-lowering medications, fenofibrate and rosuvastatin. Her cholesterol levels are under good control, with an LDL of 81 and total cholesterol of 162. There is no history of cardiac issues or myocardial infarction. She has been advised to continue rosuvastatin and may discontinue fenofibrate, with plans to monitor triglyceride levels.  - Character: Cholesterol levels under good control.  - Severity: LDL of 81 and total cholesterol of 162.  - Alleviating/Aggravating Factors: Continue rosuvastatin, may discontinue fenofibrate, monitor triglyceride levels.    Medication Management  She continues her gabapentin therapy under the supervision of her pain management specialist. She is also on Myrbetriq, prescribed by her urologist, and reports satisfactory results. For her irritable bowel syndrome, she takes dicyclomine intermittently, particularly when she is on opioid therapy. Her hydrocodone prescription is managed by her pain management team. She finds relief from methocarbamol, a muscle relaxant, which she takes for neck pain as other treatment options are limited to ablations. She uses Prilosec for gastrointestinal issues and a vaginal cream to prevent bladder  "infections.    Hand Issues  She has a hand issue and is consulting Dr. Lorenzana regarding trigger finger and carpal tunnel syndrome.  - Character: Trigger finger and carpal tunnel syndrome.    PAST SURGICAL HISTORY:  - Neck surgery  - Back surgery  - Knee surgery    FAMILY HISTORY  Her mother had high cholesterol.    Patient Active Problem List    Diagnosis Date Noted    Mild intermittent asthma without complication 09/08/2021    Primary insomnia 01/04/2018    Chronic pain syndrome- d/t ddd l/s and cervical spine- pain mgt- rx hc/apap qd and gabapentin 01/04/2018    Opiate dependence, continuous (HCC)- pain mgt 01/04/2018    Bladder disorder- OAB -dr. willoughby (gyn) 05/20/2015    Chronic allergic rhinitis 04/03/2014    Vitamin D deficiency disease 12/17/2013    COPD (chronic obstructive pulmonary disease) (HCC) 12/17/2013    Mixed hyperlipidemia 12/01/2011    DDD (degenerative disc disease), lumbar 12/01/2011    DDD (degenerative disc disease), cervical 12/01/2011    Menopausal and postmenopausal disorder 12/01/2011     Medications Prior to Visit[1]  Medications Prior to Visit[2]              HPI    Review of Systems   Constitutional: Negative.    HENT: Negative.     Eyes: Negative.    Respiratory: Negative.     Cardiovascular: Negative.    Gastrointestinal: Negative.    Genitourinary: Negative.    Musculoskeletal: Negative.    Skin: Negative.    Neurological: Negative.    Endo/Heme/Allergies: Negative.    Psychiatric/Behavioral: Negative.                Objective     /62 (BP Location: Right arm, Patient Position: Sitting, BP Cuff Size: Adult)   Pulse 64   Temp 36.1 °C (96.9 °F) (Temporal)   Ht 1.676 m (5' 6\")   Wt 66.7 kg (147 lb)   LMP 12/01/1976   SpO2 96%   BMI 23.73 kg/m²      Physical Exam  Vitals and nursing note reviewed.   Constitutional:       Appearance: She is well-developed.   HENT:      Head: Normocephalic and atraumatic.      Right Ear: External ear normal.      Left Ear: External ear " normal.      Nose: Nose normal.   Eyes:      General: No scleral icterus.        Right eye: No discharge.         Left eye: No discharge.      Conjunctiva/sclera: Conjunctivae normal.      Pupils: Pupils are equal, round, and reactive to light.   Neck:      Thyroid: No thyromegaly.      Vascular: No JVD.      Trachea: No tracheal deviation.   Cardiovascular:      Rate and Rhythm: Normal rate and regular rhythm.      Heart sounds: Normal heart sounds.      No friction rub. No gallop.   Pulmonary:      Effort: Pulmonary effort is normal. No respiratory distress.      Breath sounds: Normal breath sounds. No stridor. No wheezing or rales.   Chest:      Chest wall: No tenderness.   Abdominal:      General: Bowel sounds are normal. There is no distension.      Palpations: Abdomen is soft. There is no mass.      Tenderness: There is no abdominal tenderness. There is no guarding or rebound.      Hernia: No hernia is present.   Musculoskeletal:         General: No tenderness. Normal range of motion.      Cervical back: Normal range of motion and neck supple.   Lymphadenopathy:      Cervical: No cervical adenopathy.   Skin:     General: Skin is warm and dry.      Coloration: Skin is not pale.      Findings: No erythema or rash.   Neurological:      Mental Status: She is alert and oriented to person, place, and time.      Cranial Nerves: No cranial nerve deficit.      Coordination: Coordination normal.      Deep Tendon Reflexes: Reflexes are normal and symmetric. Reflexes normal.   Psychiatric:         Behavior: Behavior normal.         Thought Content: Thought content normal.         Judgment: Judgment normal.       Hospital Outpatient Visit on 06/04/2025   Component Date Value    25-Hydroxy   Vitamin D 25 06/04/2025 45     Cholesterol,Tot 06/04/2025 162     Triglycerides 06/04/2025 137     HDL 06/04/2025 54     LDL 06/04/2025 81     TSH 06/04/2025 1.970     WBC 06/04/2025 6.0     RBC 06/04/2025 5.12     Hemoglobin 06/04/2025  15.4     Hematocrit 06/04/2025 48.7 (H)     MCV 06/04/2025 95.1     MCH 06/04/2025 30.1     MCHC 06/04/2025 31.6 (L)     RDW 06/04/2025 42.1     Platelet Count 06/04/2025 342     MPV 06/04/2025 10.3     Neutrophils-Polys 06/04/2025 42.90 (L)     Lymphocytes 06/04/2025 44.50 (H)     Monocytes 06/04/2025 8.10     Eosinophils 06/04/2025 3.50     Basophils 06/04/2025 0.70     Immature Granulocytes 06/04/2025 0.30     Nucleated RBC 06/04/2025 0.00     Neutrophils (Absolute) 06/04/2025 2.55     Lymphs (Absolute) 06/04/2025 2.65     Monos (Absolute) 06/04/2025 0.48     Eos (Absolute) 06/04/2025 0.21     Baso (Absolute) 06/04/2025 0.04     Immature Granulocytes (a* 06/04/2025 0.02     NRBC (Absolute) 06/04/2025 0.00     Sodium 06/04/2025 140     Potassium 06/04/2025 4.4     Chloride 06/04/2025 106     Co2 06/04/2025 23     Anion Gap 06/04/2025 11.0     Glucose 06/04/2025 93     Bun 06/04/2025 14     Creatinine 06/04/2025 0.80     Calcium 06/04/2025 9.8     Correct Calcium 06/04/2025 9.6     AST(SGOT) 06/04/2025 22     ALT(SGPT) 06/04/2025 12     Alkaline Phosphatase 06/04/2025 63     Total Bilirubin 06/04/2025 0.4     Albumin 06/04/2025 4.3     Total Protein 06/04/2025 7.0     Globulin 06/04/2025 2.7     A-G Ratio 06/04/2025 1.6     Fasting Status 06/04/2025 Fasting     GFR (CKD-EPI) 06/04/2025 74       Lab Results   Component Value Date/Time    HBA1C 5.7 (H) 11/30/2024 08:30 AM    HBA1C 6.1 (H) 05/02/2024 08:28 AM     Lab Results   Component Value Date/Time    SODIUM 140 06/04/2025 08:22 AM    POTASSIUM 4.4 06/04/2025 08:22 AM    CHLORIDE 106 06/04/2025 08:22 AM    CO2 23 06/04/2025 08:22 AM    GLUCOSE 93 06/04/2025 08:22 AM    BUN 14 06/04/2025 08:22 AM    CREATININE 0.80 06/04/2025 08:22 AM    CREATININE 0.7 08/17/2006 08:15 AM    ALKPHOSPHAT 63 06/04/2025 08:22 AM    ASTSGOT 22 06/04/2025 08:22 AM    ALTSGPT 12 06/04/2025 08:22 AM    TBILIRUBIN 0.4 06/04/2025 08:22 AM     Lab Results   Component Value Date/Time    INR  "1.03 12/07/2020 09:10 AM     Lab Results   Component Value Date/Time    CHOLSTRLTOT 162 06/04/2025 08:22 AM    LDL 81 06/04/2025 08:22 AM    HDL 54 06/04/2025 08:22 AM    TRIGLYCERIDE 137 06/04/2025 08:22 AM       No results found for: \"TESTOSTERONE\"  No results found for: \"TSH\"  Lab Results   Component Value Date/Time    FREET4 0.80 04/26/2018 12:25 PM    FREET4 0.95 03/24/2014 07:48 AM     Lab Results   Component Value Date/Time    URICACID 3.4 05/14/2012 07:53 AM     No components found for: \"VITB12\"  Lab Results   Component Value Date/Time    25HYDROXY 45 06/04/2025 08:22 AM    25HYDROXY 44 11/30/2024 08:30 AM   '                      Assessment & Plan  1. Elevated blood pressure.  - Blood pressure reported as high by the patient but measured at 124/60 mmHg in the clinic, which is within the normal range.  - No new issues reported related to blood pressure.  - Blood pressure will continue to be monitored.    2. Cholesterol management.  - Cholesterol levels are well-managed with a total cholesterol of 162 mg/dL and LDL at 81 mg/dL.  - Currently on rosuvastatin and fenofibrate. Plan to discontinue fenofibrate and monitor triglyceride levels.  - If triglycerides increase, the dosage of rosuvastatin may be adjusted.    3. Medication management.  - Currently on multiple medications including gabapentin for pain management, Myrbetriq from urologist, dicyclomine for irritable bowel syndrome, hydrocodone from pain management, methocarbamol for muscle relaxation, Prilosec for stomach issues, and a vaginal cream to prevent bladder infections.  - Medications will be continued as prescribed.  - Refill for rosuvastatin will be provided.    4. Follow-up.  - Follow-up appointment scheduled in 6 months.  - Lab orders provided for next visit.  - Monitoring of triglyceride levels and adjustment of rosuvastatin dosage if necessary.    1. Mixed hyperlipidemia     - rosuvastatin (CRESTOR) 10 MG Tab; Take 1 Tablet by mouth every " evening.  Dispense: 100 Tablet; Refill: 2  - CBC WITH DIFFERENTIAL; Future  - Comp Metabolic Panel; Future  - TSH; Future  - Lipid Profile; Future    2. Abdominal cramps     - dicyclomine (BENTYL) 10 MG Cap; TAKE ONE CAPSULE BY MOUTH TWO TIMES A DAY. before breakfast and dinner  Dispense: 180 Capsule; Refill: 4    3. Gastroesophageal reflux disease with esophagitis without hemorrhage     - omeprazole (PRILOSEC) 40 MG delayed-release capsule; Take 1 Capsule by mouth 1 time a day as needed (abd pain/ gerd).  Dispense: 100 Capsule; Refill: 2    4. Atrophic vaginitis     - estradiol (ESTRACE) 0.1 MG/GM vaginal cream; Insert 1 g into the vagina every 72 hours.  Dispense: 42.5 g; Refill: 3    5. Chronic pain syndrome- d/t ddd l/s and cervical spine- pain mgt- rx hc/apap qd and gabapentin     - gabapentin (NEURONTIN) 100 MG Cap; Take 2 Capsules by mouth 3 times a day. Prescribed by pain management.    6. OAB (overactive bladder)     - Mirabegron ER (MYRBETRIQ) 50 MG TABLET SR 24 HR; Take 50 mg by mouth every day.    7. IFG (impaired fasting glucose) (Primary)       - HEMOGLOBIN A1C; Future          Assessment & Plan  Mixed hyperlipidemia  Under good control.  That appears she does not need fenofibrate in addition to her statin.  Will limit the fenofibrate as her triglycerides have been normal except for 1 time in the past at 180.  If triglycerides increase on follow-up visit then we will increase rosuvastatin and hold off on the fenofibrate.  She has no side effects from the fenofibrate but there is always a risk of drug interaction with the statin and if we can eliminate this possibility we will do so.  Orders:    rosuvastatin (CRESTOR) 10 MG Tab; Take 1 Tablet by mouth every evening.    CBC WITH DIFFERENTIAL; Future    Comp Metabolic Panel; Future    TSH; Future    Lipid Profile; Future    Abdominal cramps    Orders:    dicyclomine (BENTYL) 10 MG Cap; TAKE ONE CAPSULE BY MOUTH TWO TIMES A DAY. before breakfast and  dinner    Gastroesophageal reflux disease with esophagitis without hemorrhage    Orders:    omeprazole (PRILOSEC) 40 MG delayed-release capsule; Take 1 Capsule by mouth 1 time a day as needed (abd pain/ gerd).    Atrophic vaginitis    Orders:    estradiol (ESTRACE) 0.1 MG/GM vaginal cream; Insert 1 g into the vagina every 72 hours.    Chronic pain syndrome- d/t ddd l/s and cervical spine- pain mgt- rx hc/apap qd and gabapentin    Orders:    gabapentin (NEURONTIN) 100 MG Cap; Take 2 Capsules by mouth 3 times a day. Prescribed by pain management.    OAB (overactive bladder)    Orders:    Mirabegron ER (MYRBETRIQ) 50 MG TABLET SR 24 HR; Take 50 mg by mouth every day.    IFG (impaired fasting glucose)    Orders:    HEMOGLOBIN A1C; Future                      [1]   Outpatient Medications Prior to Visit   Medication Sig Dispense Refill    methocarbamol (ROBAXIN) 750 MG Tab Take 1 Tablet by mouth 4 times a day. 120 Tablet 3    HYDROcodone/acetaminophen (NORCO)  MG Tab TK 1 T PO Q 4-6 H PRN .DNFU 07/23      fenofibrate micronized (LOFIBRA) 134 MG capsule TAKE ONE CAPSULE BY MOUTH EVERY MORNING BEFORE BREAKFAST 90 Capsule 4    rosuvastatin (CRESTOR) 10 MG Tab Take 1 Tablet by mouth every evening. 90 Tablet 4    omeprazole (PRILOSEC) 40 MG delayed-release capsule Take 1 Capsule by mouth 1 time a day as needed (abd pain/ gerd). 100 Capsule 2    gabapentin (NEURONTIN) 100 MG Cap Take 2 Capsules by mouth 3 times a day. Prescribed by pain management.      Mirabegron ER (MYRBETRIQ) 50 MG TABLET SR 24 HR Take 50 mg by mouth every day. 90 Tablet 3    dicyclomine (BENTYL) 10 MG Cap TAKE ONE CAPSULE BY MOUTH TWO TIMES A DAY. before breakfast and dinner 180 Capsule 4    nitrofurantoin (MACROBID) 100 MG Cap Take 1 Capsule by mouth 2 times a day as needed (uti).      estradiol (ESTRACE) 0.1 MG/GM vaginal cream Insert 1 g into the vagina every 72 hours.       No facility-administered medications prior to visit.   [2]   Outpatient  Medications Prior to Visit   Medication Sig Dispense Refill    methocarbamol (ROBAXIN) 750 MG Tab Take 1 Tablet by mouth 4 times a day. 120 Tablet 3    HYDROcodone/acetaminophen (NORCO)  MG Tab TK 1 T PO Q 4-6 H PRN .DNFU 07/23      fenofibrate micronized (LOFIBRA) 134 MG capsule TAKE ONE CAPSULE BY MOUTH EVERY MORNING BEFORE BREAKFAST 90 Capsule 4    rosuvastatin (CRESTOR) 10 MG Tab Take 1 Tablet by mouth every evening. 90 Tablet 4    omeprazole (PRILOSEC) 40 MG delayed-release capsule Take 1 Capsule by mouth 1 time a day as needed (abd pain/ gerd). 100 Capsule 2    gabapentin (NEURONTIN) 100 MG Cap Take 2 Capsules by mouth 3 times a day. Prescribed by pain management.      Mirabegron ER (MYRBETRIQ) 50 MG TABLET SR 24 HR Take 50 mg by mouth every day. 90 Tablet 3    dicyclomine (BENTYL) 10 MG Cap TAKE ONE CAPSULE BY MOUTH TWO TIMES A DAY. before breakfast and dinner 180 Capsule 4    nitrofurantoin (MACROBID) 100 MG Cap Take 1 Capsule by mouth 2 times a day as needed (uti).      estradiol (ESTRACE) 0.1 MG/GM vaginal cream Insert 1 g into the vagina every 72 hours.       No facility-administered medications prior to visit.

## 2025-06-12 NOTE — ASSESSMENT & PLAN NOTE
Orders:    gabapentin (NEURONTIN) 100 MG Cap; Take 2 Capsules by mouth 3 times a day. Prescribed by pain management.

## 2025-06-23 PROBLEM — M65.331 TRIGGER FINGER, RIGHT MIDDLE FINGER: Status: ACTIVE | Noted: 2025-06-23

## 2025-06-23 PROBLEM — G56.01 RIGHT CARPAL TUNNEL SYNDROME: Status: ACTIVE | Noted: 2025-06-23

## 2025-08-29 ENCOUNTER — TELEPHONE (OUTPATIENT)
Dept: SCHEDULING | Facility: IMAGING CENTER | Age: 80
End: 2025-08-29
Payer: MEDICARE

## 2025-12-12 ENCOUNTER — APPOINTMENT (OUTPATIENT)
Dept: MEDICAL GROUP | Age: 80
End: 2025-12-12
Payer: MEDICARE